# Patient Record
Sex: FEMALE | Race: WHITE | Employment: OTHER | URBAN - METROPOLITAN AREA
[De-identification: names, ages, dates, MRNs, and addresses within clinical notes are randomized per-mention and may not be internally consistent; named-entity substitution may affect disease eponyms.]

---

## 2018-03-27 ENCOUNTER — TELEPHONE (OUTPATIENT)
Dept: ADMINISTRATIVE | Age: 68
End: 2018-03-27

## 2018-03-27 NOTE — TELEPHONE ENCOUNTER
Patient wants bilateral Euflexxa injections again. Please submit paperwork to insurance company for approval. Thank you.

## 2018-03-30 DIAGNOSIS — M25.561 ACUTE PAIN OF BOTH KNEES: Primary | ICD-10-CM

## 2018-03-30 DIAGNOSIS — M25.562 ACUTE PAIN OF BOTH KNEES: Primary | ICD-10-CM

## 2018-04-02 ENCOUNTER — OFFICE VISIT (OUTPATIENT)
Dept: ORTHOPEDIC SURGERY | Age: 68
End: 2018-04-02
Payer: MEDICARE

## 2018-04-02 VITALS — WEIGHT: 260 LBS | BODY MASS INDEX: 46.07 KG/M2 | HEIGHT: 63 IN

## 2018-04-02 DIAGNOSIS — M17.0 PRIMARY OSTEOARTHRITIS OF BOTH KNEES: Primary | ICD-10-CM

## 2018-04-02 PROCEDURE — 1036F TOBACCO NON-USER: CPT | Performed by: ORTHOPAEDIC SURGERY

## 2018-04-02 PROCEDURE — G8417 CALC BMI ABV UP PARAM F/U: HCPCS | Performed by: ORTHOPAEDIC SURGERY

## 2018-04-02 PROCEDURE — 3017F COLORECTAL CA SCREEN DOC REV: CPT | Performed by: ORTHOPAEDIC SURGERY

## 2018-04-02 PROCEDURE — 99213 OFFICE O/P EST LOW 20 MIN: CPT | Performed by: ORTHOPAEDIC SURGERY

## 2018-04-02 PROCEDURE — G8400 PT W/DXA NO RESULTS DOC: HCPCS | Performed by: ORTHOPAEDIC SURGERY

## 2018-04-02 PROCEDURE — 1123F ACP DISCUSS/DSCN MKR DOCD: CPT | Performed by: ORTHOPAEDIC SURGERY

## 2018-04-02 PROCEDURE — 1090F PRES/ABSN URINE INCON ASSESS: CPT | Performed by: ORTHOPAEDIC SURGERY

## 2018-04-02 PROCEDURE — 4040F PNEUMOC VAC/ADMIN/RCVD: CPT | Performed by: ORTHOPAEDIC SURGERY

## 2018-04-02 PROCEDURE — G8427 DOCREV CUR MEDS BY ELIG CLIN: HCPCS | Performed by: ORTHOPAEDIC SURGERY

## 2018-04-02 PROCEDURE — 3014F SCREEN MAMMO DOC REV: CPT | Performed by: ORTHOPAEDIC SURGERY

## 2018-04-30 ENCOUNTER — NURSE ONLY (OUTPATIENT)
Dept: ORTHOPEDIC SURGERY | Age: 68
End: 2018-04-30
Payer: MEDICARE

## 2018-04-30 DIAGNOSIS — M17.0 PRIMARY OSTEOARTHRITIS OF BOTH KNEES: Primary | ICD-10-CM

## 2018-04-30 PROCEDURE — 20610 DRAIN/INJ JOINT/BURSA W/O US: CPT | Performed by: NURSE PRACTITIONER

## 2018-04-30 RX ORDER — HYALURONATE SODIUM 10 MG/ML
20 SYRINGE (ML) INTRAARTICULAR ONCE
Status: COMPLETED | OUTPATIENT
Start: 2018-04-30 | End: 2018-04-30

## 2018-04-30 RX ADMIN — Medication 20 MG: at 09:14

## 2018-05-07 ENCOUNTER — NURSE ONLY (OUTPATIENT)
Dept: ORTHOPEDIC SURGERY | Age: 68
End: 2018-05-07
Payer: MEDICARE

## 2018-05-07 DIAGNOSIS — M17.0 PRIMARY OSTEOARTHRITIS OF BOTH KNEES: Primary | ICD-10-CM

## 2018-05-07 PROCEDURE — 20610 DRAIN/INJ JOINT/BURSA W/O US: CPT | Performed by: NURSE PRACTITIONER

## 2018-05-07 RX ORDER — HYALURONATE SODIUM 10 MG/ML
20 SYRINGE (ML) INTRAARTICULAR ONCE
Status: COMPLETED | OUTPATIENT
Start: 2018-05-07 | End: 2018-05-07

## 2018-05-07 RX ADMIN — Medication 20 MG: at 09:26

## 2018-05-14 ENCOUNTER — NURSE ONLY (OUTPATIENT)
Dept: ORTHOPEDIC SURGERY | Age: 68
End: 2018-05-14
Payer: MEDICARE

## 2018-05-14 DIAGNOSIS — M17.0 PRIMARY OSTEOARTHRITIS OF BOTH KNEES: Primary | ICD-10-CM

## 2018-05-14 PROCEDURE — 20610 DRAIN/INJ JOINT/BURSA W/O US: CPT | Performed by: NURSE PRACTITIONER

## 2018-05-14 RX ORDER — HYALURONATE SODIUM 10 MG/ML
20 SYRINGE (ML) INTRAARTICULAR ONCE
Status: COMPLETED | OUTPATIENT
Start: 2018-05-14 | End: 2018-05-14

## 2018-05-14 RX ADMIN — Medication 20 MG: at 09:21

## 2018-05-14 RX ADMIN — Medication 20 MG: at 09:22

## 2018-10-22 ENCOUNTER — OFFICE VISIT (OUTPATIENT)
Dept: ORTHOPEDIC SURGERY | Age: 68
End: 2018-10-22
Payer: MEDICARE

## 2018-10-22 VITALS — WEIGHT: 260 LBS | TEMPERATURE: 98 F | BODY MASS INDEX: 49.09 KG/M2 | HEIGHT: 61 IN

## 2018-10-22 DIAGNOSIS — M17.0 PRIMARY OSTEOARTHRITIS OF BOTH KNEES: Primary | ICD-10-CM

## 2018-10-22 PROCEDURE — 99213 OFFICE O/P EST LOW 20 MIN: CPT | Performed by: ORTHOPAEDIC SURGERY

## 2018-10-22 PROCEDURE — G8427 DOCREV CUR MEDS BY ELIG CLIN: HCPCS | Performed by: ORTHOPAEDIC SURGERY

## 2018-10-22 PROCEDURE — 4004F PT TOBACCO SCREEN RCVD TLK: CPT | Performed by: ORTHOPAEDIC SURGERY

## 2018-10-22 PROCEDURE — 4040F PNEUMOC VAC/ADMIN/RCVD: CPT | Performed by: ORTHOPAEDIC SURGERY

## 2018-10-22 PROCEDURE — G8400 PT W/DXA NO RESULTS DOC: HCPCS | Performed by: ORTHOPAEDIC SURGERY

## 2018-10-22 PROCEDURE — G8484 FLU IMMUNIZE NO ADMIN: HCPCS | Performed by: ORTHOPAEDIC SURGERY

## 2018-10-22 PROCEDURE — 1123F ACP DISCUSS/DSCN MKR DOCD: CPT | Performed by: ORTHOPAEDIC SURGERY

## 2018-10-22 PROCEDURE — 1101F PT FALLS ASSESS-DOCD LE1/YR: CPT | Performed by: ORTHOPAEDIC SURGERY

## 2018-10-22 PROCEDURE — 1090F PRES/ABSN URINE INCON ASSESS: CPT | Performed by: ORTHOPAEDIC SURGERY

## 2018-10-22 PROCEDURE — 3017F COLORECTAL CA SCREEN DOC REV: CPT | Performed by: ORTHOPAEDIC SURGERY

## 2018-10-22 PROCEDURE — G8417 CALC BMI ABV UP PARAM F/U: HCPCS | Performed by: ORTHOPAEDIC SURGERY

## 2018-11-20 ENCOUNTER — OFFICE VISIT (OUTPATIENT)
Dept: ORTHOPEDIC SURGERY | Age: 68
End: 2018-11-20
Payer: MEDICARE

## 2018-11-20 DIAGNOSIS — M17.0 PRIMARY OSTEOARTHRITIS OF BOTH KNEES: Primary | ICD-10-CM

## 2018-11-20 PROCEDURE — 20610 DRAIN/INJ JOINT/BURSA W/O US: CPT | Performed by: ORTHOPAEDIC SURGERY

## 2018-11-21 RX ORDER — HYALURONATE SODIUM 10 MG/ML
20 SYRINGE (ML) INTRAARTICULAR ONCE
Status: COMPLETED | OUTPATIENT
Start: 2018-11-21 | End: 2018-11-21

## 2018-11-21 RX ADMIN — Medication 20 MG: at 08:39

## 2018-11-27 ENCOUNTER — OFFICE VISIT (OUTPATIENT)
Dept: ORTHOPEDIC SURGERY | Age: 68
End: 2018-11-27
Payer: MEDICARE

## 2018-11-27 DIAGNOSIS — M17.0 PRIMARY OSTEOARTHRITIS OF BOTH KNEES: Primary | ICD-10-CM

## 2018-11-27 PROCEDURE — 20610 DRAIN/INJ JOINT/BURSA W/O US: CPT | Performed by: ORTHOPAEDIC SURGERY

## 2018-11-27 RX ORDER — HYALURONATE SODIUM 10 MG/ML
20 SYRINGE (ML) INTRAARTICULAR ONCE
Status: COMPLETED | OUTPATIENT
Start: 2018-11-27 | End: 2018-11-27

## 2018-11-27 RX ADMIN — Medication 20 MG: at 10:20

## 2018-12-04 ENCOUNTER — NURSE ONLY (OUTPATIENT)
Dept: ORTHOPEDIC SURGERY | Age: 68
End: 2018-12-04
Payer: MEDICARE

## 2018-12-04 DIAGNOSIS — M17.0 PRIMARY OSTEOARTHRITIS OF BOTH KNEES: Primary | ICD-10-CM

## 2018-12-04 PROCEDURE — 20610 DRAIN/INJ JOINT/BURSA W/O US: CPT | Performed by: NURSE PRACTITIONER

## 2018-12-04 RX ORDER — HYALURONATE SODIUM 10 MG/ML
20 SYRINGE (ML) INTRAARTICULAR ONCE
Status: COMPLETED | OUTPATIENT
Start: 2018-12-04 | End: 2018-12-04

## 2018-12-04 RX ADMIN — Medication 20 MG: at 10:16

## 2018-12-04 NOTE — PROGRESS NOTES
Verbal and written consent was obtained by the patient. The following is a well known to me that is here for Euflexxa #3. The knee was prepped in sterile fashion. Euflexxa 20 mg injected to Bilateral knee. The patient tolerated the injections well and I will see the patient back as needed.

## 2019-06-10 ENCOUNTER — OFFICE VISIT (OUTPATIENT)
Dept: ORTHOPEDIC SURGERY | Age: 69
End: 2019-06-10
Payer: MEDICARE

## 2019-06-10 VITALS — TEMPERATURE: 98 F | WEIGHT: 260 LBS | HEIGHT: 61 IN | BODY MASS INDEX: 49.09 KG/M2

## 2019-06-10 DIAGNOSIS — M17.0 PRIMARY OSTEOARTHRITIS OF BOTH KNEES: Primary | ICD-10-CM

## 2019-06-10 PROCEDURE — 3017F COLORECTAL CA SCREEN DOC REV: CPT | Performed by: NURSE PRACTITIONER

## 2019-06-10 PROCEDURE — G8400 PT W/DXA NO RESULTS DOC: HCPCS | Performed by: NURSE PRACTITIONER

## 2019-06-10 PROCEDURE — 4004F PT TOBACCO SCREEN RCVD TLK: CPT | Performed by: NURSE PRACTITIONER

## 2019-06-10 PROCEDURE — G8417 CALC BMI ABV UP PARAM F/U: HCPCS | Performed by: NURSE PRACTITIONER

## 2019-06-10 PROCEDURE — G8427 DOCREV CUR MEDS BY ELIG CLIN: HCPCS | Performed by: NURSE PRACTITIONER

## 2019-06-10 PROCEDURE — 1123F ACP DISCUSS/DSCN MKR DOCD: CPT | Performed by: NURSE PRACTITIONER

## 2019-06-10 PROCEDURE — 4040F PNEUMOC VAC/ADMIN/RCVD: CPT | Performed by: NURSE PRACTITIONER

## 2019-06-10 PROCEDURE — 1090F PRES/ABSN URINE INCON ASSESS: CPT | Performed by: NURSE PRACTITIONER

## 2019-06-10 PROCEDURE — 99213 OFFICE O/P EST LOW 20 MIN: CPT | Performed by: NURSE PRACTITIONER

## 2019-06-10 NOTE — PROGRESS NOTES
Chief Complaint   Patient presents with    Knee Pain     Bilateral Knee, F/U, finished Euflexxa series on 12/04/18 with good resutls. Nanci Valiente returns today for follow-up of her bilateral knee pain. she reports this is worse than when I saw her last.  The patient's pain level is a 5/10. The previous treatment of Euflexxa was successful. She finished her series on December 4, 2018. Past Medical History:   Diagnosis Date    Hypertension      Past Surgical History:   Procedure Laterality Date    ABDOMEN SURGERY  1984       Current Outpatient Medications:     lisinopril (PRINIVIL;ZESTRIL) 20 MG tablet, , Disp: , Rfl:     Multiple Vitamins-Minerals (CENTRUM PO), Take  by mouth., Disp: , Rfl:     Calcium-Vitamin D (CALTRATE 600 PLUS-VIT D PO), Take  by mouth daily. , Disp: , Rfl:     Fish Oil-Cholecalciferol (FISH OIL + D3 PO), Take  by mouth 3 times daily. , Disp: , Rfl:     aspirin 325 MG tablet, Take 325 mg by mouth daily. , Disp: , Rfl:   No Known Allergies  Social History     Socioeconomic History    Marital status: Single     Spouse name: Not on file    Number of children: Not on file    Years of education: Not on file    Highest education level: Not on file   Occupational History    Not on file   Social Needs    Financial resource strain: Not on file    Food insecurity:     Worry: Not on file     Inability: Not on file    Transportation needs:     Medical: Not on file     Non-medical: Not on file   Tobacco Use    Smoking status: Former Smoker   Substance and Sexual Activity    Alcohol use: Yes     Comment: social    Drug use: No    Sexual activity: Not on file   Lifestyle    Physical activity:     Days per week: Not on file     Minutes per session: Not on file    Stress: Not on file   Relationships    Social connections:     Talks on phone: Not on file     Gets together: Not on file     Attends Scientologist service: Not on file     Active member of club or organization: Not on file Attends meetings of clubs or organizations: Not on file     Relationship status: Not on file    Intimate partner violence:     Fear of current or ex partner: Not on file     Emotionally abused: Not on file     Physically abused: Not on file     Forced sexual activity: Not on file   Other Topics Concern    Not on file   Social History Narrative    Not on file     No family history on file. Review of Systems:     Skin: (-) rash,(-) psoriasis,(-) eczema, (-)skin cancer. Musculoskeletal: (-) fractures,  (-) dislocations,(-) collagen vascular disease, (-) fibromyalgia, (-) multiple sclerosis, (-) muscular dystrophy, (-) RSD,(-) joint pain (-)swelling, (-) joint pain,swelling. Neurologic: (-) epilepsy, (-)seizures,(-) brain tumor,(-) TIA, (-)stroke, (-)headaches, (-)Parkinson disease,(-) memory loss, (-) LOC. Cardiovascular: (-) Chest pain, (-) swelling in legs/feet, (-) SOB, (-) cramping in legs/feet with walking. Subjective:    Constitutional:    The patient is alert and oriented x 3, appears to be stated age and in no distress. Ht. 5 ft 3 in., Wt. 260 lbs. Skin:    Upon inspection: the skin appears warm, dry and intact. There is not a previous scar over the affected area. There is not any cellulitis, lymphedema or cutaneous lesions noted in the lower extremities. Upon palpation there is no induration noted. Neurologic:    Gait: normal;  Motor exam of the lower extremities show ; quadriceps, hamstrings, foot dorsi and plantar flexors intact R.  5/5 and L. 5/5. Deep tendon reflexes are 2/4 at the knees and 2/4 at the ankles with strong extensor hallicus longus motor strength bilaterally. Sensory to both feet is intact to all sensory roots. Cardiovascular: The vascular exam is normal and is well perfused to distal extremities. Distal pulses DP/PT: R. 2+; L. 2+. There is cap refill noted less than two seconds in all digits. There is not edema of the bilateral lower extremities. There is not varicosities noted in the distal extremities. Lymph:    Upon palpation,  there is no lymphadenopathy noted in bilateral lower extremities. Musculoskeletal:  Gait: normal; examination of the nails and digits reveal no cyanosis or clubbing    Lumbar exam:    On visual inspection, there is not deformity of the spine. full range of motion, no tenderness, palpable spasm or pain on motion. Special tests: Straight Leg Raise negative, Julia testnegative. Hip exam-     Upon inspection, there is not deformity noted. Upon palpation there is not tenderness. ROM: is   full and semetrical.   Strength: Hip Flexors 5/5; Hip Abductors 5/5; Hip Adduction 5/5. Knee exam      Bilateral knee exam shows;  range of motion of R. Knee is -5 to 100, and L. Knee is 0 to 95. The patient does not have  pain on motion, effusion is mild, there is not tenderness over the  global region, there are not any masses, there is not ligamentous instability, there is  deformity noted. Knee exam: bilateral positive for moderate crepitations, some mild tenderness laxity is not noted with stress. R. Knee:  Lachman's negative, Anterior Drawer negative, Posterior Drawer negative  Flora's negative, Thallasy  negative,   PF grind test negative, Apprehension test negative, Patellar J sign  negative  L. Knee:  Lachman's negative, Anterior Drawer negative, Posterior Drawer negative  Flora's negative, Thallasy  negative,   PF grind test negative, Apprehension test negative,  Patellar J sign  negative    Impression:   Encounter Diagnosis   Name Primary?  Primary osteoarthritis of both knees Yes         Plan:   I had a lengthy discussion with the patient regarding their diagnosis. I explained treatment options including surgical vs non surgical treatment. I reviewed in detail the risks and benefits and outlined the procedure in detail with expected outcomes and possible complications.   I also discussed non surgical treatment such as injections (CSI and visco supplementation), physical therapy, topical creams and NSAID's. They have elected for conservative management at this time. The patient has failed conservative measures such as NSAIDS, HEP, and cortisone injections. She is an excellent candidate for viscous supplementation injections including Euflexxa in the Bilateral knee.    We will contact the patient's insurance company and see them back in the office once we have received approval.

## 2019-06-24 ENCOUNTER — TELEPHONE (OUTPATIENT)
Dept: ADMINISTRATIVE | Age: 69
End: 2019-06-24

## 2019-06-24 NOTE — TELEPHONE ENCOUNTER
Patient called checking on status of her bilateral knee Euflexxa injections. Please follow up. Thank you.

## 2019-06-24 NOTE — TELEPHONE ENCOUNTER
Called and spoke to patient and let her know as soon as we get approval we will call her.  Electronically signed by Jorge Espinoza MA on 6/24/19 at 11:22 AM

## 2019-07-17 ENCOUNTER — NURSE ONLY (OUTPATIENT)
Dept: ORTHOPEDIC SURGERY | Age: 69
End: 2019-07-17
Payer: MEDICARE

## 2019-07-17 DIAGNOSIS — M17.0 PRIMARY OSTEOARTHRITIS OF BOTH KNEES: Primary | ICD-10-CM

## 2019-07-17 PROCEDURE — 20611 DRAIN/INJ JOINT/BURSA W/US: CPT | Performed by: NURSE PRACTITIONER

## 2019-07-17 PROCEDURE — 99999 PR OFFICE/OUTPT VISIT,PROCEDURE ONLY: CPT | Performed by: NURSE PRACTITIONER

## 2019-07-17 RX ORDER — HYALURONATE SODIUM 10 MG/ML
20 SYRINGE (ML) INTRAARTICULAR ONCE
Status: COMPLETED | OUTPATIENT
Start: 2019-07-17 | End: 2019-07-17

## 2019-07-17 RX ADMIN — Medication 20 MG: at 11:04

## 2019-07-17 NOTE — PROGRESS NOTES
Verbal and written consent was obtained by the patient. The following is a well known to me that is here for bilateral knee injections. They are here for  Euflexxa # 1. Her knees were prepped in sterile fashion. Euflexxa 20 mg injected to Bilateral knees using ultrasound guidance. The patient tolerated the injections well and I will see the patient back in 1 week for repeat injections.

## 2019-07-23 ENCOUNTER — OFFICE VISIT (OUTPATIENT)
Dept: ORTHOPEDIC SURGERY | Age: 69
End: 2019-07-23
Payer: MEDICARE

## 2019-07-23 DIAGNOSIS — M17.0 PRIMARY OSTEOARTHRITIS OF BOTH KNEES: Primary | ICD-10-CM

## 2019-07-23 PROCEDURE — 20611 DRAIN/INJ JOINT/BURSA W/US: CPT | Performed by: ORTHOPAEDIC SURGERY

## 2019-07-23 RX ORDER — HYALURONATE SODIUM 10 MG/ML
20 SYRINGE (ML) INTRAARTICULAR ONCE
Status: COMPLETED | OUTPATIENT
Start: 2019-07-23 | End: 2019-07-23

## 2019-07-23 RX ADMIN — Medication 20 MG: at 09:30

## 2019-07-30 ENCOUNTER — OFFICE VISIT (OUTPATIENT)
Dept: ORTHOPEDIC SURGERY | Age: 69
End: 2019-07-30
Payer: MEDICARE

## 2019-07-30 DIAGNOSIS — M17.0 PRIMARY OSTEOARTHRITIS OF BOTH KNEES: Primary | ICD-10-CM

## 2019-07-30 PROCEDURE — 20611 DRAIN/INJ JOINT/BURSA W/US: CPT | Performed by: ORTHOPAEDIC SURGERY

## 2019-07-30 RX ORDER — HYALURONATE SODIUM 10 MG/ML
20 SYRINGE (ML) INTRAARTICULAR ONCE
Status: COMPLETED | OUTPATIENT
Start: 2019-07-30 | End: 2019-07-30

## 2019-07-30 RX ADMIN — Medication 20 MG: at 09:11

## 2020-02-05 ENCOUNTER — OFFICE VISIT (OUTPATIENT)
Dept: ORTHOPEDIC SURGERY | Age: 70
End: 2020-02-05
Payer: MEDICARE

## 2020-02-05 VITALS — TEMPERATURE: 98.5 F | BODY MASS INDEX: 46.07 KG/M2 | HEIGHT: 63 IN | WEIGHT: 260 LBS

## 2020-02-05 PROCEDURE — 3017F COLORECTAL CA SCREEN DOC REV: CPT | Performed by: NURSE PRACTITIONER

## 2020-02-05 PROCEDURE — 99213 OFFICE O/P EST LOW 20 MIN: CPT | Performed by: NURSE PRACTITIONER

## 2020-02-05 PROCEDURE — 1123F ACP DISCUSS/DSCN MKR DOCD: CPT | Performed by: NURSE PRACTITIONER

## 2020-02-05 PROCEDURE — 4040F PNEUMOC VAC/ADMIN/RCVD: CPT | Performed by: NURSE PRACTITIONER

## 2020-02-05 PROCEDURE — G8400 PT W/DXA NO RESULTS DOC: HCPCS | Performed by: NURSE PRACTITIONER

## 2020-02-05 PROCEDURE — 20610 DRAIN/INJ JOINT/BURSA W/O US: CPT | Performed by: NURSE PRACTITIONER

## 2020-02-05 PROCEDURE — G8484 FLU IMMUNIZE NO ADMIN: HCPCS | Performed by: NURSE PRACTITIONER

## 2020-02-05 PROCEDURE — 1090F PRES/ABSN URINE INCON ASSESS: CPT | Performed by: NURSE PRACTITIONER

## 2020-02-05 PROCEDURE — 4004F PT TOBACCO SCREEN RCVD TLK: CPT | Performed by: NURSE PRACTITIONER

## 2020-02-05 PROCEDURE — G8427 DOCREV CUR MEDS BY ELIG CLIN: HCPCS | Performed by: NURSE PRACTITIONER

## 2020-02-05 PROCEDURE — G8417 CALC BMI ABV UP PARAM F/U: HCPCS | Performed by: NURSE PRACTITIONER

## 2020-02-05 RX ORDER — TRIAMCINOLONE ACETONIDE 40 MG/ML
40 INJECTION, SUSPENSION INTRA-ARTICULAR; INTRAMUSCULAR ONCE
Status: COMPLETED | OUTPATIENT
Start: 2020-02-05 | End: 2020-02-05

## 2020-02-05 RX ADMIN — TRIAMCINOLONE ACETONIDE 40 MG: 40 INJECTION, SUSPENSION INTRA-ARTICULAR; INTRAMUSCULAR at 08:38

## 2020-02-05 NOTE — PROGRESS NOTES
Chief Complaint   Patient presents with    Knee Pain     bilateral knee pain continues. Patient had great relief with Euflexxa and would like to repeat the series. Carolina Collazo returns today for follow-up of her bilateral knee pain. she reports this is worse than when I saw her last.  The patient's pain level is a 8/10. She states she gets great relief from Euflexxa and would like to repeat the series. Past Medical History:   Diagnosis Date    Hypertension      Past Surgical History:   Procedure Laterality Date    ABDOMEN SURGERY  1984       Current Outpatient Medications:     lisinopril (PRINIVIL;ZESTRIL) 20 MG tablet, , Disp: , Rfl:     Multiple Vitamins-Minerals (CENTRUM PO), Take  by mouth., Disp: , Rfl:     Calcium-Vitamin D (CALTRATE 600 PLUS-VIT D PO), Take  by mouth daily. , Disp: , Rfl:     Fish Oil-Cholecalciferol (FISH OIL + D3 PO), Take  by mouth 3 times daily. , Disp: , Rfl:     aspirin 325 MG tablet, Take 325 mg by mouth daily. , Disp: , Rfl:   No Known Allergies  Social History     Socioeconomic History    Marital status: Single     Spouse name: Not on file    Number of children: Not on file    Years of education: Not on file    Highest education level: Not on file   Occupational History    Not on file   Social Needs    Financial resource strain: Not on file    Food insecurity:     Worry: Not on file     Inability: Not on file    Transportation needs:     Medical: Not on file     Non-medical: Not on file   Tobacco Use    Smoking status: Former Smoker   Substance and Sexual Activity    Alcohol use: Yes     Comment: social    Drug use: No    Sexual activity: Not on file   Lifestyle    Physical activity:     Days per week: Not on file     Minutes per session: Not on file    Stress: Not on file   Relationships    Social connections:     Talks on phone: Not on file     Gets together: Not on file     Attends Synagogue service: Not on file     Active member of club or

## 2020-08-17 ENCOUNTER — NURSE ONLY (OUTPATIENT)
Dept: ORTHOPEDIC SURGERY | Age: 70
End: 2020-08-17
Payer: MEDICARE

## 2020-08-17 PROCEDURE — 20610 DRAIN/INJ JOINT/BURSA W/O US: CPT | Performed by: NURSE PRACTITIONER

## 2020-08-17 RX ORDER — HYALURONATE SODIUM 10 MG/ML
20 SYRINGE (ML) INTRAARTICULAR ONCE
Status: COMPLETED | OUTPATIENT
Start: 2020-08-17 | End: 2020-08-17

## 2020-08-17 RX ADMIN — Medication 20 MG: at 09:01

## 2020-08-17 RX ADMIN — Medication 20 MG: at 09:00

## 2020-08-24 ENCOUNTER — NURSE ONLY (OUTPATIENT)
Dept: ORTHOPEDIC SURGERY | Age: 70
End: 2020-08-24
Payer: MEDICARE

## 2020-08-24 PROCEDURE — 20610 DRAIN/INJ JOINT/BURSA W/O US: CPT | Performed by: NURSE PRACTITIONER

## 2020-08-24 RX ORDER — HYALURONATE SODIUM 10 MG/ML
20 SYRINGE (ML) INTRAARTICULAR ONCE
Status: COMPLETED | OUTPATIENT
Start: 2020-08-24 | End: 2020-08-24

## 2020-08-24 RX ADMIN — Medication 20 MG: at 08:48

## 2020-08-24 NOTE — PROGRESS NOTES
Chief Complaint   Patient presents with    Injections     bilateral knee euflexxa #2       Verbal and written consent was obtained by the patient. The following is a well known to me that is here for bilateral knee injections. They are here for  Euflexxa # 2. Her knees were prepped in sterile fashion. Euflexxa 20 mg injected to Bilateral knees. The patient tolerated the injections well and I will see the patient back in 1 week for repeat injections. Vi Chris was seen today for injections.     Diagnoses and all orders for this visit:    Primary osteoarthritis of both knees  -     20610 - [place] DRAIN/INJECT LARGE JOINT/BURSA  -     81329 - [place] DRAIN/INJECT LARGE JOINT/BURSA    Other orders  -     sodium hyaluronate (viscosup) injection 20 mg  -     sodium hyaluronate (viscosup) injection 20 mg

## 2020-08-31 ENCOUNTER — NURSE ONLY (OUTPATIENT)
Dept: ORTHOPEDIC SURGERY | Age: 70
End: 2020-08-31
Payer: MEDICARE

## 2020-08-31 PROCEDURE — 20610 DRAIN/INJ JOINT/BURSA W/O US: CPT | Performed by: NURSE PRACTITIONER

## 2020-08-31 RX ORDER — HYALURONATE SODIUM 10 MG/ML
20 SYRINGE (ML) INTRAARTICULAR ONCE
Status: COMPLETED | OUTPATIENT
Start: 2020-08-31 | End: 2020-08-31

## 2020-08-31 RX ADMIN — Medication 20 MG: at 09:08

## 2020-08-31 RX ADMIN — Medication 20 MG: at 09:09

## 2020-08-31 NOTE — PROGRESS NOTES
Chief Complaint   Patient presents with    Injections     bilateral euflexxa #3       Verbal and written consent was obtained by the patient. The following is a well known to me that is here for Euflexxa #3. The knee was prepped in sterile fashion. Euflexxa 20 mg injected to Bilateral knee . The patient tolerated the injections well and I will see the patient back as needed. Liz Covarrubias was seen today for injections.     Diagnoses and all orders for this visit:    Primary osteoarthritis of both knees  -     20610 - [place] DRAIN/INJECT LARGE JOINT/BURSA  -     20610 - [place] DRAIN/INJECT LARGE JOINT/BURSA    Other orders  -     sodium hyaluronate (viscosup) injection 20 mg  -     sodium hyaluronate (viscosup) injection 20 mg

## 2020-11-17 ENCOUNTER — HOSPITAL ENCOUNTER (EMERGENCY)
Age: 70
Discharge: HOME | End: 2020-11-17
Payer: MEDICARE

## 2020-11-17 ENCOUNTER — APPOINTMENT (OUTPATIENT)
Dept: GENERAL RADIOLOGY | Age: 70
End: 2020-11-17
Payer: MEDICARE

## 2020-11-17 VITALS
BODY MASS INDEX: 45.88 KG/M2 | DIASTOLIC BLOOD PRESSURE: 89 MMHG | RESPIRATION RATE: 20 BRPM | TEMPERATURE: 98.2 F | OXYGEN SATURATION: 97 % | WEIGHT: 259 LBS | HEART RATE: 91 BPM | SYSTOLIC BLOOD PRESSURE: 176 MMHG

## 2020-11-17 PROCEDURE — 73560 X-RAY EXAM OF KNEE 1 OR 2: CPT

## 2020-11-17 PROCEDURE — 99212 OFFICE O/P EST SF 10 MIN: CPT

## 2020-11-17 NOTE — ED PROVIDER NOTES
Department of Emergency Medicine  [address] [place] [address]  Provider Note  Admit Date/Time: 11/17/2020 10:33 AM  Room: 05/05  MRN: [id number]  Chief Complaint: Leg Pain (right leg pain, pain is behind the knee area, and in front of the shin, hx of lymphedema, started last night)       History of Present Illness   Source of history provided by:  patient. History/Exam Limitations: none. Cristopher Blevins is a 65 y.o. female who has a past medical history of:   Past Medical History:   Diagnosis Date    Hypertension     presents to the urgent care center by private car for pain and swelling in her right knee and also pain in the right calf just below the knee area. This  started last night. She does have lymphedema and she used her lymphedema sleeve last night to see if that would help but it did not help her. She has some chronic knee problems and gets injections by orthopedics in her knee. There  was no injury. She is  not able to work fully extend  Or flex her knee because of the increased swelling. She denies chest pain or shortness of breath. This started yesterday. ROS    Pertinent positives and negatives are stated within HPI, all other systems reviewed and are negative. Past Surgical History:   Procedure Laterality Date    ABDOMEN SURGERY  1984   Social History:  reports that she has quit smoking. She does not have any smokeless tobacco history on file. She reports current alcohol use. She reports that she does not use drugs. Family History: family history is not on file. Allergies: Patient has no known allergies. Physical Exam   Oxygen Saturation Interpretation: Normal.   ED Triage Vitals [11/17/20 1034]   BP Temp Temp src Pulse Resp SpO2 Height Weight   (!) 176/89 98.2 °F (36.8 °C) -- 91 20 97 % -- 259 lb (117.5 kg)       Physical Exam  · Constitutional/General: Alert and oriented x3, well appearing, non toxic in NAD  · HEENT:  NC/NT.  Clear conjunctiva  Airway patent. · Neck: Supple, full ROM,   · Respiratory:  Not in respiratory distress  · CV:  Regular rate. Regular rhythm. Lymphedema present in both lower extremities  · GI:  Abdomen Soft, Non tender, Non distended. +BS. No rebound, guarding, or rigidity. No pulsatile masses. · Musculoskeletal: Moves all extremities x 4. SHe can ambulate and she can flex and extend her knee she said it is not as far as before. There is no erythema or abnormal warmth over the patellar area. No calf tenderness. · Integument: skin warm and dry. No rashes. · Neurologic: GCS 15, no focal deficits, symmetric strength 5/5 in the upper and lower extremities bilaterally  · Psychiatric: Normal Affect    Lab / Imaging Results   (All laboratory and radiology results have been personally reviewed by myself)  Labs:  No results found for this visit on 11/17/20. Imaging: All Radiology results interpreted by Radiologist unless otherwise noted. US DUP LOWER EXTREMITY RIGHT CARLITO   Final Result   1. No evidence of DVT in the right lower extremity. 2.  Area of hypoechogenicity located in right popliteal fossa suggests a   popliteal cyst.  Ultrasound follow-up may be helpful to assure stability. XR KNEE RIGHT (1-2 VIEWS)   Preliminary Result   Advanced tricompartmental degenerative changes of the right knee with   bone-on-bone articulation. ED Course / Medical Decision Making   Medications - No data to display         MDM:   X-ray shows advanced arthritis from tricompartmental degenerative changes of the right knee ultrasound was negative for DVT she does have a popliteal cyst.  Discussed results with her she already sees Dr. Rose Roy for the knee arthritis. I offered to give her pain medication she said she just takes over-the-counter medicine for this-- she likes to take Aleve. She will follow-up with Dr. Rose Roy.     Counseling:    I have  spoken with the patient and discussed todays results, in addition to providing specific details for the plan of care and counseling regarding the diagnosis and prognosis. Questions are answered at this time and they are agreeable with the plan. Assessment      1. Osteoarthritis of knee, unspecified laterality, unspecified osteoarthritis type    2. Synovial cyst of popliteal space, unspecified laterality      Plan   Discharge to home and advised to contact [address]  [facility] [address]  [address], [address] [facility]  [phone]    Schedule an appointment as soon as possible for a visit      Patient condition is good    New Medications     New Prescriptions    No medications on file     Electronically signed by TEMO Wong Asa, CNP   DD: 11/17/20  **This report was transcribed using voice recognition software. Every effort was made to ensure accuracy; however, inadvertent computerized transcription errors may be present.   END OF ED PROVIDER NOTE     TEMO Wong Asa, CNP  11/17/20 [id number]

## 2020-12-07 ENCOUNTER — OFFICE VISIT (OUTPATIENT)
Dept: ORTHOPEDIC SURGERY | Age: 70
End: 2020-12-07
Payer: MEDICARE

## 2020-12-07 VITALS — WEIGHT: 259 LBS | BODY MASS INDEX: 48.9 KG/M2 | HEIGHT: 61 IN | TEMPERATURE: 98 F

## 2020-12-07 PROCEDURE — 1090F PRES/ABSN URINE INCON ASSESS: CPT | Performed by: ORTHOPAEDIC SURGERY

## 2020-12-07 PROCEDURE — G8400 PT W/DXA NO RESULTS DOC: HCPCS | Performed by: ORTHOPAEDIC SURGERY

## 2020-12-07 PROCEDURE — 99213 OFFICE O/P EST LOW 20 MIN: CPT | Performed by: ORTHOPAEDIC SURGERY

## 2020-12-07 PROCEDURE — 4004F PT TOBACCO SCREEN RCVD TLK: CPT | Performed by: ORTHOPAEDIC SURGERY

## 2020-12-07 PROCEDURE — 1123F ACP DISCUSS/DSCN MKR DOCD: CPT | Performed by: ORTHOPAEDIC SURGERY

## 2020-12-07 PROCEDURE — 3017F COLORECTAL CA SCREEN DOC REV: CPT | Performed by: ORTHOPAEDIC SURGERY

## 2020-12-07 PROCEDURE — 20610 DRAIN/INJ JOINT/BURSA W/O US: CPT | Performed by: ORTHOPAEDIC SURGERY

## 2020-12-07 PROCEDURE — 4040F PNEUMOC VAC/ADMIN/RCVD: CPT | Performed by: ORTHOPAEDIC SURGERY

## 2020-12-07 PROCEDURE — G8417 CALC BMI ABV UP PARAM F/U: HCPCS | Performed by: ORTHOPAEDIC SURGERY

## 2020-12-07 PROCEDURE — G8484 FLU IMMUNIZE NO ADMIN: HCPCS | Performed by: ORTHOPAEDIC SURGERY

## 2020-12-07 PROCEDURE — G8427 DOCREV CUR MEDS BY ELIG CLIN: HCPCS | Performed by: ORTHOPAEDIC SURGERY

## 2020-12-07 RX ORDER — TRIAMCINOLONE ACETONIDE 40 MG/ML
40 INJECTION, SUSPENSION INTRA-ARTICULAR; INTRAMUSCULAR ONCE
Status: COMPLETED | OUTPATIENT
Start: 2020-12-07 | End: 2020-12-07

## 2020-12-07 RX ADMIN — TRIAMCINOLONE ACETONIDE 40 MG: 40 INJECTION, SUSPENSION INTRA-ARTICULAR; INTRAMUSCULAR at 11:44

## 2020-12-07 NOTE — PROGRESS NOTES
distal extremities. Lymph:  Upon palpation,  there is no lymphadenopathy noted in bilateral lower extremities. Musculoskeletal:  Gait: antalgic; examination of the nails and digits reveal no cyanosis or clubbing    Lumbar exam:  On visual inspection, there is no deformity of the spine. full range of motion, no tenderness, palpable spasm or pain on motion. Special tests: Straight Leg Raise negative, Julia testnegative. Hip exam:  Upon inspection, there is no deformity noted. Upon palpation there is not tenderness. ROM: is   full and semetrical.   Strength: Hip Flexors 5/5; Hip Abductors 5/5; Hip Adduction 5/5. Knee exam:  Bilateral knee exam shows;  range of motion of R. Knee is 0 to 90, and L. Knee is 0 to 90. She does have  pain on motion, effusion is mild, there is tenderness over the  medial region, there are not any masses, there is not ligamentous instability, there is  deformity noted. Knee exam: bilateral positive for moderate crepitations, some mild tenderness laxity is not noted with  stress. R. Knee:  Lachman's negative, Anterior Drawer negative, Posterior Drawer negative  Flora's negative, Thallasy  negative,   PF grind test negative, Apprehension test negative, Patellar J sign  negative  L. Knee:  Lachman's negative, Anterior Drawer negative, Posterior Drawer negative  Flora's negative, Thallasy  negative,   PF grind test negative, Apprehension test negative,  Patellar J sign  negative    Xrays:   Impression    Advanced tricompartmental degenerative changes of the right knee with    bone-on-bone articulation. MRI:   n/a  Radiographic findings reviewed with patient    Impression:  Encounter Diagnoses   Name Primary?  Primary osteoarthritis of right knee Yes    Primary osteoarthritis of left knee        Plan:   Natural history and expected course discussed. Questions answered. Educational materials distributed.   Rest, ice, compression, and elevation (RICE) therapy. Reduction in offending activity. Patellar compression sleeve. I will proceed with a cortisone injection in the Bilateral knees. Verbal and written consent was obtained for the injections. Skin was prepped with alcohol. 1 ml of Kenalog 40mg and 9 ml of 0.25% Marcaine was  injected to Bilateral knees. The injections were given through the lateral side of the knees. The patient tolerated the injections well.  I will see the patient back prn

## 2021-03-01 ENCOUNTER — TELEPHONE (OUTPATIENT)
Dept: ADMINISTRATIVE | Age: 71
End: 2021-03-01

## 2021-03-01 NOTE — TELEPHONE ENCOUNTER
Patient calling to Follow-up for knee injections and back eval. Patient states she seen Dr. Cathi Mcwilliams previously for her back and was told to f/u with with him. Per protocol office alvarez not see pts for back. Patient unsure of which injection she is to receive for her knees. Please advise for scheduling.

## 2021-03-09 ENCOUNTER — OFFICE VISIT (OUTPATIENT)
Dept: ORTHOPEDIC SURGERY | Age: 71
End: 2021-03-09
Payer: MEDICARE

## 2021-03-09 VITALS — WEIGHT: 259 LBS | HEIGHT: 61 IN | BODY MASS INDEX: 48.9 KG/M2

## 2021-03-09 DIAGNOSIS — M17.11 PRIMARY OSTEOARTHRITIS OF RIGHT KNEE: Primary | ICD-10-CM

## 2021-03-09 DIAGNOSIS — M17.12 PRIMARY OSTEOARTHRITIS OF LEFT KNEE: ICD-10-CM

## 2021-03-09 DIAGNOSIS — M48.062 SPINAL STENOSIS OF LUMBAR REGION WITH NEUROGENIC CLAUDICATION: ICD-10-CM

## 2021-03-09 PROCEDURE — G8417 CALC BMI ABV UP PARAM F/U: HCPCS | Performed by: ORTHOPAEDIC SURGERY

## 2021-03-09 PROCEDURE — 99214 OFFICE O/P EST MOD 30 MIN: CPT | Performed by: ORTHOPAEDIC SURGERY

## 2021-03-09 PROCEDURE — 4040F PNEUMOC VAC/ADMIN/RCVD: CPT | Performed by: ORTHOPAEDIC SURGERY

## 2021-03-09 PROCEDURE — G8400 PT W/DXA NO RESULTS DOC: HCPCS | Performed by: ORTHOPAEDIC SURGERY

## 2021-03-09 PROCEDURE — 4004F PT TOBACCO SCREEN RCVD TLK: CPT | Performed by: ORTHOPAEDIC SURGERY

## 2021-03-09 PROCEDURE — 1123F ACP DISCUSS/DSCN MKR DOCD: CPT | Performed by: ORTHOPAEDIC SURGERY

## 2021-03-09 PROCEDURE — 1090F PRES/ABSN URINE INCON ASSESS: CPT | Performed by: ORTHOPAEDIC SURGERY

## 2021-03-09 PROCEDURE — G8427 DOCREV CUR MEDS BY ELIG CLIN: HCPCS | Performed by: ORTHOPAEDIC SURGERY

## 2021-03-09 PROCEDURE — 3017F COLORECTAL CA SCREEN DOC REV: CPT | Performed by: ORTHOPAEDIC SURGERY

## 2021-03-09 PROCEDURE — 20610 DRAIN/INJ JOINT/BURSA W/O US: CPT | Performed by: ORTHOPAEDIC SURGERY

## 2021-03-09 PROCEDURE — G8484 FLU IMMUNIZE NO ADMIN: HCPCS | Performed by: ORTHOPAEDIC SURGERY

## 2021-03-09 RX ORDER — TRIAMCINOLONE ACETONIDE 40 MG/ML
40 INJECTION, SUSPENSION INTRA-ARTICULAR; INTRAMUSCULAR ONCE
Status: COMPLETED | OUTPATIENT
Start: 2021-03-09 | End: 2021-03-09

## 2021-03-09 RX ADMIN — TRIAMCINOLONE ACETONIDE 40 MG: 40 INJECTION, SUSPENSION INTRA-ARTICULAR; INTRAMUSCULAR at 10:57

## 2021-03-09 RX ADMIN — TRIAMCINOLONE ACETONIDE 40 MG: 40 INJECTION, SUSPENSION INTRA-ARTICULAR; INTRAMUSCULAR at 10:56

## 2021-03-09 NOTE — PROGRESS NOTES
Chief Complaint   Patient presents with    Knee Pain     /Bilateral knee pain follow up. Previous csi 12/7/20       Kanu Parish returns today for follow-up of her bilateral knee pain. she reports this is worse than when I saw her last.  The patient's pain level is a 8/10. The previous treatment was successful. Past Medical History:   Diagnosis Date    Hypertension      Past Surgical History:   Procedure Laterality Date    ABDOMEN SURGERY  1984       Current Outpatient Medications:     lisinopril (PRINIVIL;ZESTRIL) 20 MG tablet, , Disp: , Rfl:     Multiple Vitamins-Minerals (CENTRUM PO), Take  by mouth., Disp: , Rfl:     Calcium-Vitamin D (CALTRATE 600 PLUS-VIT D PO), Take  by mouth daily. , Disp: , Rfl:     Fish Oil-Cholecalciferol (FISH OIL + D3 PO), Take  by mouth 3 times daily. , Disp: , Rfl:     aspirin 325 MG tablet, Take 325 mg by mouth daily. , Disp: , Rfl:   No Known Allergies  Social History     Socioeconomic History    Marital status: Single     Spouse name: Not on file    Number of children: Not on file    Years of education: Not on file    Highest education level: Not on file   Occupational History    Not on file   Social Needs    Financial resource strain: Not on file    Food insecurity     Worry: Not on file     Inability: Not on file    Transportation needs     Medical: Not on file     Non-medical: Not on file   Tobacco Use    Smoking status: Former Smoker   Substance and Sexual Activity    Alcohol use: Yes     Comment: social    Drug use: No    Sexual activity: Not on file   Lifestyle    Physical activity     Days per week: Not on file     Minutes per session: Not on file    Stress: Not on file   Relationships    Social connections     Talks on phone: Not on file     Gets together: Not on file     Attends [employer] service: Not on file     Active member of club or organization: Not on file     Attends meetings of clubs or organizations: Not on file     Relationship status: Not on file    Intimate partner violence     Fear of current or ex partner: Not on file     Emotionally abused: Not on file     Physically abused: Not on file     Forced sexual activity: Not on file   Other Topics Concern    Not on file   Social History Narrative    Not on file     No family history on file. Review of Systems:     Skin: (-) rash,(-) psoriasis,(-) eczema, (-)skin cancer. Musculoskeletal: (-) fractures,  (-) dislocations,(-) collagen vascular disease, (-) fibromyalgia, (-) multiple sclerosis, (-) muscular dystrophy, (-) RSD,(-) joint pain (-)swelling, (-) joint pain,swelling. Neurologic: (-) epilepsy, (-)seizures,(-) brain tumor,(-) TIA, (-)stroke, (-)headaches, (-)Parkinson disease,(-) memory loss, (-) LOC. Cardiovascular: (-) Chest pain, (-) swelling in legs/feet, (-) SOB, (-) cramping in legs/feet with walking. Constitutional:  The patient is alert and oriented x 3, appears to be stated age and in no distress. Ht 5' 1\" (1.549 m)   Wt 259 lb (117.5 kg)   BMI 48.94 kg/m²     Skin:  Upon inspection: the skin appears warm, dry and intact. There is not a previous scar over the affected area. There is not any cellulitis, lymphedema or cutaneous lesions noted in the lower extremities. Upon palpation there is no induration noted. Neurologic:  Gait: normal;  Motor exam of the lower extremities show ; quadriceps, hamstrings, foot dorsi and plantar flexors intact R.  5/5 and L. 5/5. Deep tendon reflexes are 2/4 at the knees and 2/4 at the ankles with strong extensor hallicus longus motor strength bilaterally. Sensory to both feet is intact to all sensory roots. Cardiovascular: The vascular exam is normal and is well perfused to distal extremities. Distal pulses DP/PT: R. 2+; L. 2+. There is cap refill noted less than two seconds in all digits. There is not edema of the bilateral lower extremities. There is not varicosities noted in the distal extremities.       Lymph:  Upon palpation,  there is no lymphadenopathy noted in bilateral lower extremities. Musculoskeletal:  Gait: antalgic; examination of the nails and digits reveal no cyanosis or clubbing    Lumbar exam:  On visual inspection, there is no deformity of the spine. full range of motion, no tenderness, palpable spasm or pain on motion. Special tests: Straight Leg Raise negative, Julia testnegative. Hip exam:  Upon inspection, there is no deformity noted. Upon palpation there is not tenderness. ROM: is   full and semetrical.   Strength: Hip Flexors 5/5; Hip Abductors 5/5; Hip Adduction 5/5. Knee exam:  Bilateral knee exam shows;  range of motion of R. Knee is 0 to 90, and L. Knee is 0 to 90. She does have  pain on motion, effusion is mild, there is tenderness over the  medial region, there are not any masses, there is not ligamentous instability, there is  deformity noted. Knee exam: bilateral positive for moderate crepitations, some mild tenderness laxity is not noted with  stress. R. Knee:  Lachman's negative, Anterior Drawer negative, Posterior Drawer negative  Flora's negative, Thallasy  negative,   PF grind test negative, Apprehension test negative, Patellar J sign  negative  L. Knee:  Lachman's negative, Anterior Drawer negative, Posterior Drawer negative  Flora's negative, Thallasy  negative,   PF grind test negative, Apprehension test negative,  Patellar J sign  negative    Xrays:   Impression    Advanced tricompartmental degenerative changes of the right knee with    bone-on-bone articulation. MRI:   n/a  Radiographic findings reviewed with patient    Impression:  Encounter Diagnoses   Name Primary?  Primary osteoarthritis of right knee Yes    Primary osteoarthritis of left knee        Plan:   Natural history and expected course discussed. Questions answered. Educational materials distributed. Rest, ice, compression, and elevation (RICE) therapy. Reduction in offending activity. Patellar compression sleeve. I will proceed with a cortisone injection in the Bilateral knees. Verbal and written consent was obtained for the injections. Skin was prepped with alcohol. 1 ml of Kenalog 40mg and 9 ml of 0.25% Marcaine was  injected to Bilateral knees. The injections were given through the lateral side of the knees. The patient tolerated the injections well. I will see the patient back 3 months.     referal to dr Israel Cos

## 2021-03-16 ENCOUNTER — OFFICE VISIT (OUTPATIENT)
Dept: PHYSICAL MEDICINE AND REHAB | Age: 71
End: 2021-03-16
Payer: MEDICARE

## 2021-03-16 VITALS
BODY MASS INDEX: 47.95 KG/M2 | HEART RATE: 69 BPM | HEIGHT: 61 IN | WEIGHT: 254 LBS | DIASTOLIC BLOOD PRESSURE: 82 MMHG | SYSTOLIC BLOOD PRESSURE: 124 MMHG

## 2021-03-16 DIAGNOSIS — G89.29 CHRONIC LEFT-SIDED LOW BACK PAIN WITH LEFT-SIDED SCIATICA: Primary | ICD-10-CM

## 2021-03-16 DIAGNOSIS — M54.42 CHRONIC LEFT-SIDED LOW BACK PAIN WITH LEFT-SIDED SCIATICA: Primary | ICD-10-CM

## 2021-03-16 DIAGNOSIS — M47.816 LUMBAR SPONDYLOSIS: ICD-10-CM

## 2021-03-16 PROCEDURE — 1090F PRES/ABSN URINE INCON ASSESS: CPT | Performed by: PHYSICAL MEDICINE & REHABILITATION

## 2021-03-16 PROCEDURE — 4040F PNEUMOC VAC/ADMIN/RCVD: CPT | Performed by: PHYSICAL MEDICINE & REHABILITATION

## 2021-03-16 PROCEDURE — 1123F ACP DISCUSS/DSCN MKR DOCD: CPT | Performed by: PHYSICAL MEDICINE & REHABILITATION

## 2021-03-16 PROCEDURE — G8400 PT W/DXA NO RESULTS DOC: HCPCS | Performed by: PHYSICAL MEDICINE & REHABILITATION

## 2021-03-16 PROCEDURE — G8427 DOCREV CUR MEDS BY ELIG CLIN: HCPCS | Performed by: PHYSICAL MEDICINE & REHABILITATION

## 2021-03-16 PROCEDURE — G8417 CALC BMI ABV UP PARAM F/U: HCPCS | Performed by: PHYSICAL MEDICINE & REHABILITATION

## 2021-03-16 PROCEDURE — 1036F TOBACCO NON-USER: CPT | Performed by: PHYSICAL MEDICINE & REHABILITATION

## 2021-03-16 PROCEDURE — 99204 OFFICE O/P NEW MOD 45 MIN: CPT | Performed by: PHYSICAL MEDICINE & REHABILITATION

## 2021-03-16 PROCEDURE — G8484 FLU IMMUNIZE NO ADMIN: HCPCS | Performed by: PHYSICAL MEDICINE & REHABILITATION

## 2021-03-16 PROCEDURE — 3017F COLORECTAL CA SCREEN DOC REV: CPT | Performed by: PHYSICAL MEDICINE & REHABILITATION

## 2021-03-16 NOTE — PROGRESS NOTES
Christine Thomas, [id number] [facility] Physical Medicine and Rehabilitation  [address] [facility]  Phone: [phone]  Fax: [phone]    PCP: Julissa Redd MD  Date of visit: 3/16/21    Chief Complaint   Patient presents with   Kevin Thomas        Dear Dr. Michael Colby,     Thank you for referring your patient to be seen. As you know,  Moon Hickman is a 65 y.o. female with past medical history as below who presents with left buttock and hip pain for few months. Her goal is to be able to walk better. Now, the pain is intermittent and occurs daily. The pain is rated Pain Score:   1, is described as achy, and is located in the left buttock. It was radiating into the left leg, but this has improved. She is getting knee injections which has helped the pain, her ability to do stairs and walk. The pain is worse with doing chores. After 15 minutes or so, she will sit down for some time and then pain will get better. There is  Numbness/tingling in her feet. There is no weakness. There is no bowel/bladder changes. The prior workup has included: Xray     The prior treatment has included:  PT: yes and she continues HEP diligently. Does not want medications. No Known Allergies    Current Outpatient Medications   Medication Sig Dispense Refill    lisinopril (PRINIVIL;ZESTRIL) 20 MG tablet       Multiple Vitamins-Minerals (CENTRUM PO) Take  by mouth.  Calcium-Vitamin D (CALTRATE 600 PLUS-VIT D PO) Take  by mouth daily.  Fish Oil-Cholecalciferol (FISH OIL + D3 PO) Take  by mouth 3 times daily.  aspirin 325 MG tablet Take 325 mg by mouth daily. No current facility-administered medications for this visit.         Past Medical History:   Diagnosis Date    Hypertension     Lymphedema     Metabolic syndrome        Past Surgical History:   Procedure Laterality Date    ABDOMEN SURGERY  1984    VEIN SURGERY         Family History   Problem Relation Age of Onset    Heart Failure Mother     Alzheimer's Disease Father     Anemia Maternal Grandmother     Diabetes Paternal Grandmother        Social History     Tobacco Use    Smoking status: Former Smoker    Smokeless tobacco: Never Used   Substance Use Topics    Alcohol use: Yes     Comment: social    Drug use: No          Functional Status: The patient is able to ambulate and perform activities of daily living without the use of an assistive device. Occupation: The patient is currently retired. ROS: For more complete ROS answered by the patient, please see . Constitutional: Denies fevers, chills, night sweats, unintentional weight loss     Skin: Denies rash or skin changes     Eyes: Denies vision changes    Ears/Nose/Throat: Denies nasal congestion or sore throat     Respiratory: Denies SOB or cough     Cardiovascular: Denies CP, palpitations, edema      Gastrointestinal: Denies abdominal pain,  N/V, constipation, or diarrhea    Genitourinary: Denies urinary symptoms    Neurologic: See HPI.     MSK: See HPI. Psychiatric: Denies sleep disturbance, anxiety, depression    Hematologic/Lymphatic/Immunologic: Denies bruising       Physical Exam:   Blood pressure 124/82, pulse 69, height 5' 1\" (1.549 m), weight 254 lb (115.2 kg). General: well developed and well nourished in no acute distress. Body habitus is morbidly obese  HEENT: No rhinorrhea, sneezing, yawning, or lacrimation. No scleral icterus or conjunctival injection. Resp: symmetrical chest expansion, unlabored breathing, respirations unlabored. CV: Heart rate is regular. Peripheral pulses are palpable  Lymph: No visible regional lymphadenopathy. Skin: No rashes or ecchymosis. Normal turgor. Psych: Mood is calm. Affect is normal.   Ext: bilateral lower extremity lymphedema     MSK:   Back/Hip Exam:   Inspection: Pelvis was asymmetric. Lumbar lordotic curvature was increased. There was no scoliosis.   No ecchymoses or erythema. Palpation: Palpatory exam revealed no tenderness along lumbosacral paraspinals, midline spine, ttp left SI joint sulcus, ttp left gluteus sanjiv. There was no paraspinal spasms. There were no trigger points. ROM: ROM decreased  Special/provocative testing:   Supine SLR negative     Neurological Exam:  Strength:   R  L  Hip Flex  5  5  Knee Ext  5  5  Ankle dorsi  5  5  EHL   5 5-  Ankle Plantar  5  5    Sensory:  Intact for light touch in all lower extremity dermatomes. Reflexes:   R  L  Patellar  (0) (0)  Ankle Jerk  (0) (0)      Gait is Antalgic. Imaging: (personally reviewed by me 03/16/21)  X-ray L spine     Impression:   Dominique Sue is a 65 y.o. female     1. Chronic left-sided low back pain with left-sided sciatica    2. Lumbar spondylosis        Plan:   · Continue HEP. · Discussed proper walking form. · Added clam shells and pelvic tilts to exercise regimen. · If pain becomes worse or affects daily activities, return to discuss further work up and treatment.  The patient was educated about the diagnosis, prognosis, indications, risks and benefits of treatment. An opportunity to ask questions was given to the patient and questions were answered. The patient agreed to proceed with the recommended treatment as described above. Thank you for the consultation and for allowing me to participate in the care of this patient.         Sincerely,     Michael Torres DO, [facility]   Board Certified Physical Medicine and Rehabilitation

## 2021-03-26 ENCOUNTER — IMMUNIZATION (OUTPATIENT)
Dept: PRIMARY CARE CLINIC | Age: 71
End: 2021-03-26
Payer: MEDICARE

## 2021-03-26 PROCEDURE — 0011A COVID-19, MODERNA VACCINE 100MCG/0.5ML DOSE: CPT | Performed by: NURSE PRACTITIONER

## 2021-03-26 PROCEDURE — 91301 COVID-19, MODERNA VACCINE 100MCG/0.5ML DOSE: CPT | Performed by: NURSE PRACTITIONER

## 2021-04-26 ENCOUNTER — IMMUNIZATION (OUTPATIENT)
Dept: PRIMARY CARE CLINIC | Age: 71
End: 2021-04-26
Payer: MEDICARE

## 2021-04-26 PROCEDURE — 91301 COVID-19, MODERNA VACCINE 100MCG/0.5ML DOSE: CPT | Performed by: NURSE PRACTITIONER

## 2021-04-26 PROCEDURE — 0012A COVID-19, MODERNA VACCINE 100MCG/0.5ML DOSE: CPT | Performed by: NURSE PRACTITIONER

## 2021-05-10 ENCOUNTER — APPOINTMENT (OUTPATIENT)
Dept: GENERAL RADIOLOGY | Age: 71
End: 2021-05-10
Payer: MEDICARE

## 2021-05-10 ENCOUNTER — HOSPITAL ENCOUNTER (EMERGENCY)
Age: 71
Discharge: HOME | End: 2021-05-10
Payer: MEDICARE

## 2021-05-10 VITALS
HEIGHT: 61 IN | OXYGEN SATURATION: 95 % | RESPIRATION RATE: 18 BRPM | WEIGHT: 259 LBS | HEART RATE: 74 BPM | BODY MASS INDEX: 48.9 KG/M2 | TEMPERATURE: 97.1 F | DIASTOLIC BLOOD PRESSURE: 84 MMHG | SYSTOLIC BLOOD PRESSURE: 136 MMHG

## 2021-05-10 DIAGNOSIS — M25.562 ACUTE PAIN OF BOTH KNEES: ICD-10-CM
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
## 2021-05-10 DIAGNOSIS — M25.561 ACUTE PAIN OF BOTH KNEES: ICD-10-CM

## 2021-05-10 DIAGNOSIS — M17.10 ARTHRITIS OF KNEE: ICD-10-CM

## 2021-05-10 PROCEDURE — 73560 X-RAY EXAM OF KNEE 1 OR 2: CPT

## 2021-05-10 PROCEDURE — 99211 OFF/OP EST MAY X REQ PHY/QHP: CPT

## 2021-05-10 RX ORDER — VITAMIN E 268 MG
400 CAPSULE ORAL DAILY
COMMUNITY
End: 2022-10-14 | Stop reason: ALTCHOICE

## 2021-05-10 ASSESSMENT — PAIN - FUNCTIONAL ASSESSMENT
PAIN_FUNCTIONAL_ASSESSMENT: ACTIVITIES ARE NOT PREVENTED
PAIN_FUNCTIONAL_ASSESSMENT: 0-10
PAIN_FUNCTIONAL_ASSESSMENT: ACTIVITIES ARE NOT PREVENTED

## 2021-05-10 ASSESSMENT — PAIN DESCRIPTION - ORIENTATION: ORIENTATION: RIGHT;LEFT

## 2021-05-10 ASSESSMENT — PAIN DESCRIPTION - ONSET
ONSET: ON-GOING
ONSET: ON-GOING

## 2021-05-10 ASSESSMENT — PAIN DESCRIPTION - FREQUENCY
FREQUENCY: CONTINUOUS
FREQUENCY: CONTINUOUS

## 2021-05-10 ASSESSMENT — PAIN SCALES - GENERAL
PAINLEVEL_OUTOF10: 6
PAINLEVEL_OUTOF10: 6

## 2021-05-10 ASSESSMENT — PAIN DESCRIPTION - PROGRESSION: CLINICAL_PROGRESSION: NOT CHANGED

## 2021-05-10 ASSESSMENT — PAIN DESCRIPTION - LOCATION
LOCATION: KNEE
LOCATION: KNEE

## 2021-05-10 ASSESSMENT — PAIN DESCRIPTION - PAIN TYPE: TYPE: ACUTE PAIN

## 2021-05-10 NOTE — ED PROVIDER NOTES
Department of Emergency Medicine  [address] [place] [address]  Provider Note  Admit Date/Time: 5/10/2021  4:54 PM  Room:   NAME: Mercedes Santos  : 1950  MRN: [id number]     Chief Complaint:  Fall (Pt states \"I got up out of the chair & got Myself steady & My feet started to get going Backwards so I tried to grab Myself & I went face forward & I Tried to break My fall with My hands & I hit My head & My glasses went flying but didn't break\"  Pt \"Denies being on Any Blood thinners\" )    History of Present Illness        Mercedes Santos is a 66 y.o. female who has a past medical history of:   Past Medical History:   Diagnosis Date    Hypertension     Lymphedema     Metabolic syndrome     presents to the urgent care center by private car for relation of knee pain. She said this happened yesterday she said that she has trouble walking because of her balance and she said that she has chronic back pain and chronic knee problems and she did get her feet going right and she actually fell forward she said her nose hit the carpet but she did not really hit her head she does not have any headache or neck pain or facial pain at all. She said it is her knees she landed on her knees is what happened. And she said that she is having pain especially in the left knee she just wants to make sure there was nothing acute on the x-rays. She is seeing [facility] for severe arthritis in both knees and is getting injections every 3 months. ROS    Pertinent positives and negatives are stated within HPI, all other systems reviewed and are negative. Past Surgical History:   Procedure Laterality Date    ABDOMEN SURGERY      VEIN SURGERY     Social History:  reports that she has quit smoking. She has never used smokeless tobacco. She reports current alcohol use. She reports that she does not use drugs. Family History: family history includes Alzheimer's Disease in her father;  Anemia in her maternal grandmother; Diabetes in her paternal grandmother; Heart Failure in her mother. Allergies: Patient has no known allergies. Physical Exam   Oxygen Saturation Interpretation: Normal.   ED Triage Vitals [05/10/21 1657]   BP Temp Temp Source Pulse Resp SpO2 Height Weight   136/84 97.1 °F (36.2 °C) Temporal 74 18 95 % 5' 1\" (1.549 m) 259 lb (117.5 kg)       Physical Exam  · Constitutional/General: Alert and oriented x3, well appearing, non toxic in NAD  · HEENT:  NC/NT. Clear conjunctiva,    · Neck: Supple, full ROM, non tender to palpation in the midline,   · Respiratory: Lungs clear to auscultation bilaterally, no wheezes, rales, or rhonchi. Not in respiratory distress  · CV:  Regular rate. Regular rhythm. No murmurs, gallops, or rubs. Chronic edema of the bilateral lower extremities 4+. · Musculoskeletal: Moves all extremities x 4. Can ambulate normally she has crepitus with range of motion of both knees she has some ecchymosis on the right knee, there is no erythema or abnormal warmth. · Integument: skin warm and dry. No rashes. · Neurologic: GCS 15, no focal deficits,   · Psychiatric: Normal Affect    Lab / Imaging Results   (All laboratory and radiology results have been personally reviewed by myself)  Labs:  No results found for this visit on 05/10/21. Imaging: All Radiology results interpreted by Radiologist unless otherwise noted. XR KNEE LEFT (1-2 VIEWS)   Final Result   Advanced left knee osteoarthritis, primarily involving the medial and lateral   compartments. No acute fracture or dislocation. No effusion. XR KNEE RIGHT (1-2 VIEWS)   Final Result   Advanced tricompartmental right knee osteoarthritis. No acute fracture or   dislocation. No effusion. ED Course / Medical Decision Making   Medications - No data to display     MDM:   I did x-ray both knees. And they were negative for fracture she does have advanced arthritis in both knees.   She sees orthopedics for that she will follow up with Dr. Farzaneh Aguirre. She said she did not want anything for pain she does takes aspirin and she will continue to take that. Said she mainly just wanted to make sure she did not break anything. She has no signs of any head injury she does not have any headache nausea vomiting lethargy or any other complaints she said mainly she just hit her nose on the carpet her weight went onto her knees. Assessment      1. Fall, initial encounter    2. Acute pain of both knees    3. Arthritis of knee      Plan   Discharge to home and advised to contact MD Genaro Teixeira [address] [place] [address] [phone]      As needed    [facility], [address]  [phone]         Patient condition is good    New Medications     New Prescriptions    No medications on file     Electronically signed by TEMO Anthony CNP   DD: 5/10/21  **This report was transcribed using voice recognition software. Every effort was made to ensure accuracy; however, inadvertent computerized transcription errors may be present.   END OF ED PROVIDER NOTE     TEMO Anthony CNP  05/10/21 9975

## 2021-05-11 ENCOUNTER — TELEPHONE (OUTPATIENT)
Dept: ORTHOPEDIC SURGERY | Age: 71
End: 2021-05-11

## 2021-05-11 DIAGNOSIS — M17.12 PRIMARY OSTEOARTHRITIS OF LEFT KNEE: ICD-10-CM

## 2021-05-11 DIAGNOSIS — M17.11 PRIMARY OSTEOARTHRITIS OF RIGHT KNEE: Primary | ICD-10-CM

## 2021-05-11 NOTE — TELEPHONE ENCOUNTER
Patient called in asking if we can do an order for PT to help strengthen her knees and she would also like a quad cane to help with stability when walking.     Please advise

## 2021-06-08 ENCOUNTER — OFFICE VISIT (OUTPATIENT)
Dept: ORTHOPEDIC SURGERY | Age: 71
End: 2021-06-08
Payer: MEDICARE

## 2021-06-08 VITALS — BODY MASS INDEX: 48.71 KG/M2 | WEIGHT: 258 LBS | HEIGHT: 61 IN

## 2021-06-08 DIAGNOSIS — M17.12 PRIMARY OSTEOARTHRITIS OF LEFT KNEE: ICD-10-CM

## 2021-06-08 DIAGNOSIS — M17.11 PRIMARY OSTEOARTHRITIS OF RIGHT KNEE: Primary | ICD-10-CM

## 2021-06-08 PROCEDURE — 99213 OFFICE O/P EST LOW 20 MIN: CPT | Performed by: ORTHOPAEDIC SURGERY

## 2021-06-08 PROCEDURE — G8417 CALC BMI ABV UP PARAM F/U: HCPCS | Performed by: ORTHOPAEDIC SURGERY

## 2021-06-08 PROCEDURE — 3017F COLORECTAL CA SCREEN DOC REV: CPT | Performed by: ORTHOPAEDIC SURGERY

## 2021-06-08 PROCEDURE — 4040F PNEUMOC VAC/ADMIN/RCVD: CPT | Performed by: ORTHOPAEDIC SURGERY

## 2021-06-08 PROCEDURE — 1123F ACP DISCUSS/DSCN MKR DOCD: CPT | Performed by: ORTHOPAEDIC SURGERY

## 2021-06-08 PROCEDURE — 1090F PRES/ABSN URINE INCON ASSESS: CPT | Performed by: ORTHOPAEDIC SURGERY

## 2021-06-08 PROCEDURE — G8427 DOCREV CUR MEDS BY ELIG CLIN: HCPCS | Performed by: ORTHOPAEDIC SURGERY

## 2021-06-08 PROCEDURE — 1036F TOBACCO NON-USER: CPT | Performed by: ORTHOPAEDIC SURGERY

## 2021-06-08 PROCEDURE — G8400 PT W/DXA NO RESULTS DOC: HCPCS | Performed by: ORTHOPAEDIC SURGERY

## 2021-06-08 PROCEDURE — 20610 DRAIN/INJ JOINT/BURSA W/O US: CPT | Performed by: ORTHOPAEDIC SURGERY

## 2021-06-08 RX ORDER — TRIAMCINOLONE ACETONIDE 40 MG/ML
40 INJECTION, SUSPENSION INTRA-ARTICULAR; INTRAMUSCULAR ONCE
Status: COMPLETED | OUTPATIENT
Start: 2021-06-08 | End: 2021-06-08

## 2021-06-08 RX ADMIN — TRIAMCINOLONE ACETONIDE 40 MG: 40 INJECTION, SUSPENSION INTRA-ARTICULAR; INTRAMUSCULAR at 11:27

## 2021-06-08 RX ADMIN — TRIAMCINOLONE ACETONIDE 40 MG: 40 INJECTION, SUSPENSION INTRA-ARTICULAR; INTRAMUSCULAR at 11:28

## 2021-06-08 NOTE — PROGRESS NOTES
 Worried About [facility] in the Last Year:    [address] in the Last Year:    Transportation Needs:     Lack of Transportation (Medical):  Lack of Transportation (Non-Medical):    Physical Activity:     Days of Exercise per Week:     Minutes of Exercise per Session:    Stress:     Feeling of Stress :    Social Connections:     Frequency of Communication with Friends and Family:     Frequency of Social Gatherings with Friends and Family:     Attends [employer] Services:     Active Member of Clubs or Organizations:     Attends Club or Organization Meetings:     Marital Status:    Intimate Partner Violence:     Fear of Current or Ex-Partner:     Emotionally Abused:     Physically Abused:     Sexually Abused:      Family History   Problem Relation Age of Onset    Heart Failure Mother     Alzheimer's Disease Father     Anemia Maternal Grandmother     Diabetes Paternal Grandmother        Review of Systems:     Skin: (-) rash,(-) psoriasis,(-) eczema, (-)skin cancer. Musculoskeletal: (-) fractures,  (-) dislocations,(-) collagen vascular disease, (-) fibromyalgia, (-) multiple sclerosis, (-) muscular dystrophy, (-) RSD,(-) joint pain (-)swelling, (-) joint pain,swelling. Neurologic: (-) epilepsy, (-)seizures,(-) brain tumor,(-) TIA, (-)stroke, (-)headaches, (-)Parkinson disease,(-) memory loss, (-) LOC. Cardiovascular: (-) Chest pain, (-) swelling in legs/feet, (-) SOB, (-) cramping in legs/feet with walking. Constitutional:  The patient is alert and oriented x 3, appears to be stated age and in no distress. Ht 5' 1\" (1.549 m)   Wt 258 lb (117 kg)   BMI 48.75 kg/m²     Skin:  Upon inspection: the skin appears warm, dry and intact. There is not a previous scar over the affected area. There is not any cellulitis, lymphedema or cutaneous lesions noted in the lower extremities. Upon palpation there is no induration noted.       Neurologic:  Gait: normal;  Motor exam of the lower extremities show ; quadriceps, hamstrings, foot dorsi and plantar flexors intact R.  5/5 and L. 5/5. Deep tendon reflexes are 2/4 at the knees and 2/4 at the ankles with strong extensor hallicus longus motor strength bilaterally. Sensory to both feet is intact to all sensory roots. Cardiovascular: The vascular exam is normal and is well perfused to distal extremities. Distal pulses DP/PT: R. 2+; L. 2+. There is cap refill noted less than two seconds in all digits. There is not edema of the bilateral lower extremities. There is not varicosities noted in the distal extremities. Lymph:  Upon palpation,  there is no lymphadenopathy noted in bilateral lower extremities. Musculoskeletal:  Gait: antalgic; examination of the nails and digits reveal no cyanosis or clubbing    Lumbar exam:  On visual inspection, there is no deformity of the spine. full range of motion, no tenderness, palpable spasm or pain on motion. Special tests: Straight Leg Raise negative, Julia testnegative. Hip exam:  Upon inspection, there is no deformity noted. Upon palpation there is not tenderness. ROM: is   full and semetrical.   Strength: Hip Flexors 5/5; Hip Abductors 5/5; Hip Adduction 5/5. Knee exam:  Bilateral knee exam shows;  range of motion of R. Knee is 0 to 90, and L. Knee is 0 to 90. She does have  pain on motion, effusion is mild, there is tenderness over the  medial region, there are not any masses, there is not ligamentous instability, there is  deformity noted. Knee exam: bilateral positive for moderate crepitations, some mild tenderness laxity is not noted with  stress.       R. Knee:  Lachman's negative, Anterior Drawer negative, Posterior Drawer negative  Flora's negative, Thallasy  negative,   PF grind test negative, Apprehension test negative, Patellar J sign  negative  L. Knee:  Lachman's negative, Anterior Drawer negative, Posterior Drawer negative  Flora's negative, Thallasy  negative,   PF grind test negative, Apprehension test negative,  Patellar J sign  negative    Xrays:   Impression    Advanced tricompartmental degenerative changes of the right knee with    bone-on-bone articulation. MRI:   n/a  Radiographic findings reviewed with patient    Impression:  Encounter Diagnoses   Name Primary?  Primary osteoarthritis of right knee Yes    Primary osteoarthritis of left knee        Plan:   Natural history and expected course discussed. Questions answered. Educational materials distributed. Rest, ice, compression, and elevation (RICE) therapy. Reduction in offending activity. Patellar compression sleeve. I will proceed with a cortisone injection in the Bilateral knees. Verbal and written consent was obtained for the injections. Skin was prepped with alcohol. 1 ml of Kenalog 40mg and 9 ml of 0.25% Marcaine was  injected to Bilateral knees. The injections were given through the lateral side of the knees. The patient tolerated the injections well. I will see the patient back 2 months.

## 2021-08-10 ENCOUNTER — OFFICE VISIT (OUTPATIENT)
Dept: ORTHOPEDIC SURGERY | Age: 71
End: 2021-08-10
Payer: MEDICARE

## 2021-08-10 VITALS — HEIGHT: 61 IN | BODY MASS INDEX: 47.18 KG/M2 | WEIGHT: 249.9 LBS | TEMPERATURE: 98 F

## 2021-08-10 DIAGNOSIS — M17.12 PRIMARY OSTEOARTHRITIS OF LEFT KNEE: ICD-10-CM

## 2021-08-10 DIAGNOSIS — M17.11 PRIMARY OSTEOARTHRITIS OF RIGHT KNEE: Primary | ICD-10-CM

## 2021-08-10 PROCEDURE — 4040F PNEUMOC VAC/ADMIN/RCVD: CPT | Performed by: ORTHOPAEDIC SURGERY

## 2021-08-10 PROCEDURE — 1123F ACP DISCUSS/DSCN MKR DOCD: CPT | Performed by: ORTHOPAEDIC SURGERY

## 2021-08-10 PROCEDURE — 3017F COLORECTAL CA SCREEN DOC REV: CPT | Performed by: ORTHOPAEDIC SURGERY

## 2021-08-10 PROCEDURE — 1036F TOBACCO NON-USER: CPT | Performed by: ORTHOPAEDIC SURGERY

## 2021-08-10 PROCEDURE — 1090F PRES/ABSN URINE INCON ASSESS: CPT | Performed by: ORTHOPAEDIC SURGERY

## 2021-08-10 PROCEDURE — G8417 CALC BMI ABV UP PARAM F/U: HCPCS | Performed by: ORTHOPAEDIC SURGERY

## 2021-08-10 PROCEDURE — 20610 DRAIN/INJ JOINT/BURSA W/O US: CPT | Performed by: ORTHOPAEDIC SURGERY

## 2021-08-10 PROCEDURE — G8400 PT W/DXA NO RESULTS DOC: HCPCS | Performed by: ORTHOPAEDIC SURGERY

## 2021-08-10 PROCEDURE — G8427 DOCREV CUR MEDS BY ELIG CLIN: HCPCS | Performed by: ORTHOPAEDIC SURGERY

## 2021-08-10 PROCEDURE — 99213 OFFICE O/P EST LOW 20 MIN: CPT | Performed by: ORTHOPAEDIC SURGERY

## 2021-08-10 NOTE — PROGRESS NOTES
Chief Complaint   Patient presents with    Knee Pain     b/l knee pain f/u has been doing PT and has helped her, has some stiffness        [facility] returns today for follow-up of her bilateral knee pain. she reports this is worse than when I saw her last.  The patient's pain level is a 8/10. The previous treatment was not successful. Past Medical History:   Diagnosis Date    Hypertension     Lymphedema     Metabolic syndrome      Past Surgical History:   Procedure Laterality Date    ABDOMEN SURGERY  1984    VEIN SURGERY         Current Outpatient Medications:     Garlic 10 MG CAPS, Take by mouth, Disp: , Rfl:     vitamin E 400 UNIT capsule, Take 400 Units by mouth daily, Disp: , Rfl:     B Complex Vitamins (B-COMPLEX/B-12 PO), Take by mouth, Disp: , Rfl:     lisinopril (PRINIVIL;ZESTRIL) 20 MG tablet, , Disp: , Rfl:     Multiple Vitamins-Minerals (CENTRUM PO), Take  by mouth., Disp: , Rfl:     Calcium-Vitamin D (CALTRATE 600 PLUS-VIT D PO), Take  by mouth daily. , Disp: , Rfl:     Fish Oil-Cholecalciferol (FISH OIL + D3 PO), Take  by mouth 3 times daily. , Disp: , Rfl:     aspirin 325 MG tablet, Take 325 mg by mouth daily. , Disp: , Rfl:   No Known Allergies  Social History     Socioeconomic History    Marital status: Single     Spouse name: Not on file    Number of children: Not on file    Years of education: Not on file    Highest education level: Not on file   Occupational History    Not on file   Tobacco Use    Smoking status: Former Smoker    Smokeless tobacco: Never Used   Vaping Use    Vaping Use: Never used   Substance and Sexual Activity    Alcohol use: Yes     Comment: social    Drug use: No    Sexual activity: Not Currently     Partners: Male   Other Topics Concern    Not on file   Social History Narrative    Not on file     Social Determinants of Health     Financial Resource Strain:     Difficulty of Paying Living Expenses:    Food Insecurity:     Worried About Running Out of Food in the Last Year:    [address] in the Last Year:    Transportation Needs:     Lack of Transportation (Medical):  Lack of Transportation (Non-Medical):    Physical Activity:     Days of Exercise per Week:     Minutes of Exercise per Session:    Stress:     Feeling of Stress :    Social Connections:     Frequency of Communication with Friends and Family:     Frequency of Social Gatherings with Friends and Family:     Attends [employer] Services:     Active Member of Clubs or Organizations:     Attends Club or Organization Meetings:     Marital Status:    Intimate Partner Violence:     Fear of Current or Ex-Partner:     Emotionally Abused:     Physically Abused:     Sexually Abused:      Family History   Problem Relation Age of Onset    Heart Failure Mother     Alzheimer's Disease Father     Anemia Maternal Grandmother     Diabetes Paternal Grandmother        Review of Systems:     Skin: (-) rash,(-) psoriasis,(-) eczema, (-)skin cancer. Musculoskeletal: (-) fractures,  (-) dislocations,(-) collagen vascular disease, (-) fibromyalgia, (-) multiple sclerosis, (-) muscular dystrophy, (-) RSD,(-) joint pain (-)swelling, (-) joint pain,swelling. Neurologic: (-) epilepsy, (-)seizures,(-) brain tumor,(-) TIA, (-)stroke, (-)headaches, (-)Parkinson disease,(-) memory loss, (-) LOC. Cardiovascular: (-) Chest pain, (-) swelling in legs/feet, (-) SOB, (-) cramping in legs/feet with walking. Constitutional:  The patient is alert and oriented x 3, appears to be stated age and in no distress. Temp 98 °F (36.7 °C)   Ht 5' 1\" (1.549 m)   Wt 249 lb 14.4 oz (113.4 kg)   BMI 47.22 kg/m²     Skin:  Upon inspection: the skin appears warm, dry and intact. There is not a previous scar over the affected area. There is not any cellulitis, lymphedema or cutaneous lesions noted in the lower extremities. Upon palpation there is no induration noted.       Neurologic:  Gait: normal;  Motor exam of the lower extremities show ; quadriceps, hamstrings, foot dorsi and plantar flexors intact R.  5/5 and L. 5/5. Deep tendon reflexes are 2/4 at the knees and 2/4 at the ankles with strong extensor hallicus longus motor strength bilaterally. Sensory to both feet is intact to all sensory roots. Cardiovascular: The vascular exam is normal and is well perfused to distal extremities. Distal pulses DP/PT: R. 2+; L. 2+. There is cap refill noted less than two seconds in all digits. There is not edema of the bilateral lower extremities. There is not varicosities noted in the distal extremities. Lymph:  Upon palpation,  there is no lymphadenopathy noted in bilateral lower extremities. Musculoskeletal:  Gait: antalgic; examination of the nails and digits reveal no cyanosis or clubbing    Lumbar exam:  On visual inspection, there is no deformity of the spine. full range of motion, no tenderness, palpable spasm or pain on motion. Special tests: Straight Leg Raise negative, Julia testnegative. Hip exam:  Upon inspection, there is no deformity noted. Upon palpation there is not tenderness. ROM: is   full and semetrical.   Strength: Hip Flexors 5/5; Hip Abductors 5/5; Hip Adduction 5/5. Knee exam:  Bilateral knee exam shows;  range of motion of R. Knee is 0 to 90, and L. Knee is 0 to 90. She does have  pain on motion, effusion is mild, there is tenderness over the  medial region, there are not any masses, there is not ligamentous instability, there is  deformity noted.  Knee exam: bilateral positive for moderate crepitations, some mild tenderness laxity is not noted with  stress.       R. Knee:  Lachman's negative, Anterior Drawer negative, Posterior Drawer negative  Flora's negative, Thallasy  negative,   PF grind test negative, Apprehension test negative, Patellar J sign  negative  L. Knee:  Lachman's negative, Anterior Drawer negative, Posterior Drawer negative  Flora's negative, Thallasy

## 2021-08-24 ENCOUNTER — HOSPITAL ENCOUNTER (OUTPATIENT)
Age: 71
Discharge: HOME | End: 2021-08-26
Payer: MEDICARE

## 2021-08-24 PROCEDURE — U0005 INFEC AGEN DETEC AMPLI PROBE: HCPCS

## 2021-08-24 PROCEDURE — U0003 INFECTIOUS AGENT DETECTION BY NUCLEIC ACID (DNA OR RNA); SEVERE ACUTE RESPIRATORY SYNDROME CORONAVIRUS 2 (SARS-COV-2) (CORONAVIRUS DISEASE [COVID-19]), AMPLIFIED PROBE TECHNIQUE, MAKING USE OF HIGH THROUGHPUT TECHNOLOGIES AS DESCRIBED BY CMS-2020-01-R: HCPCS

## 2021-11-09 ENCOUNTER — OFFICE VISIT (OUTPATIENT)
Dept: ORTHOPEDIC SURGERY | Age: 71
End: 2021-11-09
Payer: MEDICARE

## 2021-11-09 VITALS — HEIGHT: 61 IN | WEIGHT: 252 LBS | BODY MASS INDEX: 47.58 KG/M2

## 2021-11-09 DIAGNOSIS — M17.12 PRIMARY OSTEOARTHRITIS OF LEFT KNEE: ICD-10-CM

## 2021-11-09 DIAGNOSIS — M17.11 PRIMARY OSTEOARTHRITIS OF RIGHT KNEE: Primary | ICD-10-CM

## 2021-11-09 PROCEDURE — 20610 DRAIN/INJ JOINT/BURSA W/O US: CPT | Performed by: ORTHOPAEDIC SURGERY

## 2021-11-09 NOTE — PROGRESS NOTES
Chief Complaint   Patient presents with    Knee Pain     Bilateral knee pain follow up. I will proceed with a cortisone injection in the Bilateral knee. Verbal and written consent was obtained for the injections. The skin was prepped with alcohol. A prepared mixture of 32 mg of Zilretta and 5mL diluent was injected to Bilateral knee. The injection was given through the lateral side of the knee. The patient tolerated the injection well. I will see the patient back 3 months. Shabbir Devin was seen today for knee pain.     Diagnoses and all orders for this visit:    Primary osteoarthritis of right knee  -     [place] ARTHROCENTESIS ASPIR&/INJ MAJOR JT/BURSA W/O US    Primary osteoarthritis of left knee  -     [place] ARTHROCENTESIS ASPIR&/INJ MAJOR JT/BURSA W/O US    Other orders  -     triamcinolone acetonide (ZILRETTA) intra-articular injection 32 mg  -     triamcinolone acetonide (ZILRETTA) intra-articular injection 32 mg

## 2021-11-15 ENCOUNTER — IMMUNIZATION (OUTPATIENT)
Dept: PRIMARY CARE CLINIC | Age: 71
End: 2021-11-15
Payer: MEDICARE

## 2021-11-15 PROCEDURE — 91306 COVID-19, MODERNA BOOSTER VACCINE 0.25ML DOSE: CPT | Performed by: NURSE PRACTITIONER

## 2021-11-15 PROCEDURE — 0064A COVID-19, MODERNA BOOSTER VACCINE 0.25ML DOSE: CPT | Performed by: NURSE PRACTITIONER

## 2021-12-01 DIAGNOSIS — M48.062 SPINAL STENOSIS OF LUMBAR REGION WITH NEUROGENIC CLAUDICATION: Primary | ICD-10-CM

## 2022-02-17 ENCOUNTER — OFFICE VISIT (OUTPATIENT)
Dept: ORTHOPEDIC SURGERY | Age: 72
End: 2022-02-17
Payer: MEDICARE

## 2022-02-17 DIAGNOSIS — M17.12 PRIMARY OSTEOARTHRITIS OF LEFT KNEE: ICD-10-CM

## 2022-02-17 DIAGNOSIS — M17.11 PRIMARY OSTEOARTHRITIS OF RIGHT KNEE: Primary | ICD-10-CM

## 2022-02-17 PROCEDURE — 20610 DRAIN/INJ JOINT/BURSA W/O US: CPT | Performed by: ORTHOPAEDIC SURGERY

## 2022-02-18 NOTE — PROGRESS NOTES
Chief Complaint   Patient presents with    Knee Pain     f/u bilateral knee pain. Zilretta injections. I will proceed with a cortisone injection in the Bilateral knee. Verbal and written consent was obtained for the injections. The skin was prepped with alcohol. A prepared mixture of 32 mg of Zilretta and 5mL diluent was injected to Bilateral knee. The injection was given through the lateral side of the knee. The patient tolerated the injection well. I will see the patient back prn. Letty Barnes was seen today for knee pain.     Diagnoses and all orders for this visit:    Primary osteoarthritis of right knee    Primary osteoarthritis of left knee

## 2022-03-09 DIAGNOSIS — M79.641 RIGHT HAND PAIN: Primary | ICD-10-CM

## 2022-05-10 ENCOUNTER — OFFICE VISIT (OUTPATIENT)
Dept: PHYSICAL MEDICINE AND REHAB | Age: 72
End: 2022-05-10
Payer: MEDICARE

## 2022-05-10 VITALS
BODY MASS INDEX: 49.28 KG/M2 | DIASTOLIC BLOOD PRESSURE: 77 MMHG | HEIGHT: 61 IN | WEIGHT: 261 LBS | SYSTOLIC BLOOD PRESSURE: 145 MMHG | HEART RATE: 80 BPM

## 2022-05-10 DIAGNOSIS — R26.9 GAIT DIFFICULTY: ICD-10-CM

## 2022-05-10 DIAGNOSIS — G89.29 CHRONIC LEFT-SIDED LOW BACK PAIN WITH LEFT-SIDED SCIATICA: Primary | ICD-10-CM

## 2022-05-10 DIAGNOSIS — M47.816 LUMBAR SPONDYLOSIS: ICD-10-CM

## 2022-05-10 DIAGNOSIS — M54.17 RADICULOPATHY, LUMBOSACRAL REGION: ICD-10-CM

## 2022-05-10 DIAGNOSIS — M54.42 CHRONIC LEFT-SIDED LOW BACK PAIN WITH LEFT-SIDED SCIATICA: Primary | ICD-10-CM

## 2022-05-10 DIAGNOSIS — I89.0 LYMPHEDEMA: ICD-10-CM

## 2022-05-10 PROCEDURE — G8417 CALC BMI ABV UP PARAM F/U: HCPCS | Performed by: PHYSICAL MEDICINE & REHABILITATION

## 2022-05-10 PROCEDURE — 3017F COLORECTAL CA SCREEN DOC REV: CPT | Performed by: PHYSICAL MEDICINE & REHABILITATION

## 2022-05-10 PROCEDURE — 1090F PRES/ABSN URINE INCON ASSESS: CPT | Performed by: PHYSICAL MEDICINE & REHABILITATION

## 2022-05-10 PROCEDURE — 99214 OFFICE O/P EST MOD 30 MIN: CPT | Performed by: PHYSICAL MEDICINE & REHABILITATION

## 2022-05-10 PROCEDURE — G8427 DOCREV CUR MEDS BY ELIG CLIN: HCPCS | Performed by: PHYSICAL MEDICINE & REHABILITATION

## 2022-05-10 PROCEDURE — G8400 PT W/DXA NO RESULTS DOC: HCPCS | Performed by: PHYSICAL MEDICINE & REHABILITATION

## 2022-05-10 PROCEDURE — 1036F TOBACCO NON-USER: CPT | Performed by: PHYSICAL MEDICINE & REHABILITATION

## 2022-05-10 PROCEDURE — 4040F PNEUMOC VAC/ADMIN/RCVD: CPT | Performed by: PHYSICAL MEDICINE & REHABILITATION

## 2022-05-10 PROCEDURE — 1123F ACP DISCUSS/DSCN MKR DOCD: CPT | Performed by: PHYSICAL MEDICINE & REHABILITATION

## 2022-05-10 NOTE — PROGRESS NOTES
Ernestina Calderon, [id number] [facility]  [address]  Phone: [phone]  Fax: [phone]    PCP: Juan Luis Seals MD  Date of visit: 5/10/22    Chief Complaint   Patient presents with    Back Pain     follow up     Interval:   Patient presents today for follow up visit today regarding low back pain, and trouble walking. She was unable to get around much this winter due to instability with walking. She would like to restart PT to help with this. She reports left sided low back pain that radiates into the left leg- L4, L5 distributions. She reports worsening lymphedema in both legs. Wants to go back to PT. The pain is rated Pain Score:   5, is described as achy. The pain is worse with activity. Better with sitting down. There is  Numbness/tingling in her left leg. There is no weakness. There is no bowel/bladder changes. The prior workup has included: Xray     The prior treatment has included:  PT: yes and she continues HEP diligently. Does not want medications. No Known Allergies    Current Outpatient Medications   Medication Sig Dispense Refill    Garlic 10 MG CAPS Take by mouth      vitamin E 400 UNIT capsule Take 400 Units by mouth daily      B Complex Vitamins (B-COMPLEX/B-12 PO) Take by mouth      lisinopril (PRINIVIL;ZESTRIL) 20 MG tablet       Multiple Vitamins-Minerals (CENTRUM PO) Take  by mouth.  Calcium-Vitamin D (CALTRATE 600 PLUS-VIT D PO) Take  by mouth daily.  Fish Oil-Cholecalciferol (FISH OIL + D3 PO) Take  by mouth 3 times daily.  aspirin 325 MG tablet Take 325 mg by mouth daily. No current facility-administered medications for this visit.        Past Medical History:   Diagnosis Date    Hypertension     Lymphedema     Metabolic syndrome        Past Surgical History:   Procedure Laterality Date    ABDOMEN SURGERY  1984    VEIN SURGERY         Family History   Problem Relation Age of Onset    Heart Failure Mother     Alzheimer's Disease Father     Anemia Maternal Grandmother     Diabetes Paternal Grandmother        Social History     Tobacco Use    Smoking status: Former Smoker    Smokeless tobacco: Never Used   Vaping Use    Vaping Use: Never used   Substance Use Topics    Alcohol use: Yes     Comment: social    Drug use: No          Functional Status: The patient is able to ambulate and perform activities of daily living with the use of an assistive device. Occupation: The patient is currently retired. ROS:    Constitutional: Denies fevers, chills, night sweats, unintentional weight loss     Skin: Denies rash or skin changes     Eyes: Denies vision changes    Ears/Nose/Throat: Denies nasal congestion or sore throat     Respiratory: Denies SOB or cough     Cardiovascular: Denies CP, palpitations, edema      Gastrointestinal: Denies abdominal pain,  N/V, constipation, or diarrhea    Genitourinary: Denies urinary symptoms    Neurologic: See HPI.     MSK: See HPI. Psychiatric: Denies sleep disturbance, anxiety, depression    Hematologic/Lymphatic/Immunologic: Denies bruising       Physical Exam:   Blood pressure (!) 145/77, pulse 80, height 5' 1\" (1.549 m), weight 261 lb (118.4 kg). General: well developed and well nourished in no acute distress. Body habitus is morbidly obese  HEENT: No rhinorrhea, sneezing, yawning, or lacrimation. No scleral icterus or conjunctival injection. Resp: symmetrical chest expansion, unlabored breathing, respirations unlabored. CV: Heart rate is regular. Peripheral pulses are palpable  Lymph: No visible regional lymphadenopathy. Skin: No rashes or ecchymosis. Normal turgor. Psych: Mood is calm. Affect is normal.   Ext: bilateral lower extremity lymphedema     MSK:   Back/Hip Exam:   Inspection: Pelvis was asymmetric. Lumbar lordotic curvature was increased. There was no scoliosis. No ecchymoses or erythema. Palpation: Palpatory exam revealed no tenderness along lumbosacral paraspinals, midline spine, ttp left SI joint sulcus, ttp left gluteus sanjiv. There was no paraspinal spasms. There were no trigger points. ROM: ROM decreased  Special/provocative testing:   SLR negative     Neurological Exam:  Strength:   R  L  Hip Flex  5  5  Knee Ext  5  5  Ankle dorsi  5  5  EHL   5 5-  Ankle Plantar  5  5    Sensory:  Intact for light touch in all lower extremity dermatomes. Reflexes:   R  L  Patellar  (0) (0)  Ankle Jerk  (0) (0)      Gait is Antalgic. Imaging: (personally reviewed by me 05/10/22)  X-ray L spine     Impression:   Van Duque is a 74 y.o. female     1. Chronic left-sided low back pain with left-sided sciatica    2. Lumbar spondylosis    3. Lymphedema    4. Gait difficulty    5. Radiculopathy, lumbosacral region    6. Body mass index (BMI) 45.0-49.9, adult (HCC)        Plan:   · Will place new referral to PT   · Obtain lumbar MRI to evaluate for cause of left L4, L5 radicular pain. · Discussed epidural once MRI done. · Recommend weight loss through healthy diet and exercise. · Follow up with ortho for knee injections.  The patient was educated about the diagnosis, prognosis, indications, risks and benefits of treatment. An opportunity to ask questions was given to the patient and questions were answered. The patient agreed to proceed with the recommended treatment as described above.       Follow up - will call after MRI complete to discuss epidural.       Tremaine Barbosa DO, [facility]   Board Certified Physical Medicine and Rehabilitation

## 2022-05-19 ENCOUNTER — OFFICE VISIT (OUTPATIENT)
Dept: ORTHOPEDIC SURGERY | Age: 72
End: 2022-05-19
Payer: MEDICARE

## 2022-05-19 DIAGNOSIS — M17.11 PRIMARY OSTEOARTHRITIS OF RIGHT KNEE: Primary | ICD-10-CM

## 2022-05-19 DIAGNOSIS — M17.12 PRIMARY OSTEOARTHRITIS OF LEFT KNEE: ICD-10-CM

## 2022-05-19 PROCEDURE — 20610 DRAIN/INJ JOINT/BURSA W/O US: CPT | Performed by: ORTHOPAEDIC SURGERY

## 2022-05-20 NOTE — PROGRESS NOTES
Chief Complaint   Patient presents with    Injections     bilateral knee Vianne Expose         I will proceed with a cortisone injection in the Bilateral knee. Verbal and written consent was obtained for the injections. The skin was prepped with alcohol. A prepared mixture of 32 mg of Zilretta and 5mL diluent was injected to Bilateral knee. The injection was given through the lateral side of the knee. The patient tolerated the injection well. I will see the patient back prn. Janeen Mondragon was seen today for injections.     Diagnoses and all orders for this visit:    Primary osteoarthritis of right knee  -     [place] ARTHROCENTESIS ASPIR&/INJ MAJOR JT/BURSA W/O US  -     triamcinolone acetonide (ZILRETTA) intra-articular injection 32 mg    Primary osteoarthritis of left knee  -     [place] ARTHROCENTESIS ASPIR&/INJ MAJOR JT/BURSA W/O US  -     triamcinolone acetonide (ZILRETTA) intra-articular injection 32 mg

## 2022-05-25 ENCOUNTER — TELEPHONE (OUTPATIENT)
Dept: PHYSICAL MEDICINE AND REHAB | Age: 72
End: 2022-05-25

## 2022-05-25 NOTE — TELEPHONE ENCOUNTER
Patient called back in and said she is unsure of the epidural at this time that she would like to do her therapy for a while and see if that helps before ordering the injection

## 2022-05-25 NOTE — TELEPHONE ENCOUNTER
Called and spoke with the patient to inform her of the results of her MRI. Patient stated that she would like to have epidural done. Please place order. Patient is aware and voiced understanding.

## 2022-08-25 ENCOUNTER — OFFICE VISIT (OUTPATIENT)
Dept: ORTHOPEDIC SURGERY | Age: 72
End: 2022-08-25
Payer: MEDICARE

## 2022-08-25 DIAGNOSIS — M17.12 PRIMARY OSTEOARTHRITIS OF LEFT KNEE: ICD-10-CM

## 2022-08-25 DIAGNOSIS — M17.11 PRIMARY OSTEOARTHRITIS OF RIGHT KNEE: Primary | ICD-10-CM

## 2022-08-25 PROCEDURE — 99999 PR OFFICE/OUTPT VISIT,PROCEDURE ONLY: CPT | Performed by: ORTHOPAEDIC SURGERY

## 2022-08-25 PROCEDURE — 20610 DRAIN/INJ JOINT/BURSA W/O US: CPT | Performed by: ORTHOPAEDIC SURGERY

## 2022-08-31 NOTE — PROGRESS NOTES
Chief Complaint   Patient presents with    Injections     Bilateral knee Crystal Bain        I will proceed with a cortisone injection in the Bilateral knee. Verbal and written consent was obtained for the injections. The skin was prepped with alcohol. A prepared mixture of 32 mg of Zilretta and 5mL diluent was injected to Bilateral knee. The injection was given through the lateral side of the knee. The patient tolerated the injection well. I will see the patient back prn. Juana Alas was seen today for injections.     Diagnoses and all orders for this visit:    Primary osteoarthritis of right knee  -     [place] ARTHROCENTESIS ASPIR&/INJ MAJOR JT/BURSA W/O US  -     [place] ARTHROCENTESIS ASPIR&/INJ MAJOR JT/BURSA W/O US    Primary osteoarthritis of left knee  -     triamcinolone acetonide (ZILRETTA) intra-articular injection 32 mg  -     triamcinolone acetonide (ZILRETTA) intra-articular injection 32 mg

## 2022-09-28 DIAGNOSIS — M17.12 PRIMARY OSTEOARTHRITIS OF LEFT KNEE: ICD-10-CM

## 2022-09-28 DIAGNOSIS — M17.11 PRIMARY OSTEOARTHRITIS OF RIGHT KNEE: Primary | ICD-10-CM

## 2022-10-14 ENCOUNTER — APPOINTMENT (OUTPATIENT)
Dept: GENERAL RADIOLOGY | Age: 72
DRG: 543 | End: 2022-10-14
Payer: MEDICARE

## 2022-10-14 ENCOUNTER — APPOINTMENT (OUTPATIENT)
Dept: CT IMAGING | Age: 72
DRG: 543 | End: 2022-10-14
Payer: MEDICARE

## 2022-10-14 ENCOUNTER — HOSPITAL ENCOUNTER (INPATIENT)
Age: 72
LOS: 4 days | Discharge: NURSING, REHAB OR LONG-TERM CARE | DRG: 543 | End: 2022-10-18
Attending: EMERGENCY MEDICINE | Admitting: INTERNAL MEDICINE
Payer: MEDICARE

## 2022-10-14 DIAGNOSIS — S82.142A CLOSED FRACTURE OF LEFT TIBIAL PLATEAU, INITIAL ENCOUNTER: ICD-10-CM

## 2022-10-14 DIAGNOSIS — R26.2 UNABLE TO AMBULATE: Primary | ICD-10-CM

## 2022-10-14 LAB
ALBUMIN SERPL-MCNC: 4.1 G/DL (ref 3.5–5.2)
ALP BLD-CCNC: 78 U/L (ref 35–104)
ALT SERPL-CCNC: 18 U/L (ref 0–32)
ANION GAP SERPL CALCULATED.3IONS-SCNC: 15 MMOL/L (ref 7–16)
AST SERPL-CCNC: 21 U/L (ref 0–31)
BASOPHILS ABSOLUTE: 0.04 E9/L (ref 0–0.2)
BASOPHILS RELATIVE PERCENT: 0.6 % (ref 0–2)
BILIRUB SERPL-MCNC: 0.7 MG/DL (ref 0–1.2)
BILIRUBIN DIRECT: <0.2 MG/DL (ref 0–0.3)
BILIRUBIN, INDIRECT: NORMAL MG/DL (ref 0–1)
BUN BLDV-MCNC: 18 MG/DL (ref 6–23)
CALCIUM SERPL-MCNC: 9.5 MG/DL (ref 8.6–10.2)
CHLORIDE BLD-SCNC: 98 MMOL/L (ref 98–107)
CO2: 22 MMOL/L (ref 22–29)
CREAT SERPL-MCNC: 0.6 MG/DL (ref 0.5–1)
EKG ATRIAL RATE: 66 BPM
EKG P AXIS: 42 DEGREES
EKG P-R INTERVAL: 182 MS
EKG Q-T INTERVAL: 400 MS
EKG QRS DURATION: 84 MS
EKG QTC CALCULATION (BAZETT): 419 MS
EKG R AXIS: 19 DEGREES
EKG T AXIS: 34 DEGREES
EKG VENTRICULAR RATE: 66 BPM
EOSINOPHILS ABSOLUTE: 0.05 E9/L (ref 0.05–0.5)
EOSINOPHILS RELATIVE PERCENT: 0.7 % (ref 0–6)
GFR AFRICAN AMERICAN: >60
GFR NON-AFRICAN AMERICAN: >60 ML/MIN/1.73
GLUCOSE BLD-MCNC: 117 MG/DL (ref 74–99)
HCT VFR BLD CALC: 41.4 % (ref 34–48)
HEMOGLOBIN: 14.1 G/DL (ref 11.5–15.5)
IMMATURE GRANULOCYTES #: 0.02 E9/L
IMMATURE GRANULOCYTES %: 0.3 % (ref 0–5)
LYMPHOCYTES ABSOLUTE: 1.75 E9/L (ref 1.5–4)
LYMPHOCYTES RELATIVE PERCENT: 24.3 % (ref 20–42)
MCH RBC QN AUTO: 31.3 PG (ref 26–35)
MCHC RBC AUTO-ENTMCNC: 34.1 % (ref 32–34.5)
MCV RBC AUTO: 91.8 FL (ref 80–99.9)
MONOCYTES ABSOLUTE: 0.58 E9/L (ref 0.1–0.95)
MONOCYTES RELATIVE PERCENT: 8 % (ref 2–12)
NEUTROPHILS ABSOLUTE: 4.77 E9/L (ref 1.8–7.3)
NEUTROPHILS RELATIVE PERCENT: 66.1 % (ref 43–80)
PDW BLD-RTO: 13.3 FL (ref 11.5–15)
PLATELET # BLD: 229 E9/L (ref 130–450)
PMV BLD AUTO: 8.8 FL (ref 7–12)
POTASSIUM REFLEX MAGNESIUM: 4 MMOL/L (ref 3.5–5)
RBC # BLD: 4.51 E12/L (ref 3.5–5.5)
SODIUM BLD-SCNC: 135 MMOL/L (ref 132–146)
TOTAL PROTEIN: 7.8 G/DL (ref 6.4–8.3)
WBC # BLD: 7.2 E9/L (ref 4.5–11.5)

## 2022-10-14 PROCEDURE — 80048 BASIC METABOLIC PNL TOTAL CA: CPT

## 2022-10-14 PROCEDURE — 1200000000 HC SEMI PRIVATE

## 2022-10-14 PROCEDURE — 73562 X-RAY EXAM OF KNEE 3: CPT

## 2022-10-14 PROCEDURE — 6370000000 HC RX 637 (ALT 250 FOR IP): Performed by: INTERNAL MEDICINE

## 2022-10-14 PROCEDURE — 93005 ELECTROCARDIOGRAM TRACING: CPT | Performed by: STUDENT IN AN ORGANIZED HEALTH CARE EDUCATION/TRAINING PROGRAM

## 2022-10-14 PROCEDURE — 73700 CT LOWER EXTREMITY W/O DYE: CPT

## 2022-10-14 PROCEDURE — 71045 X-RAY EXAM CHEST 1 VIEW: CPT

## 2022-10-14 PROCEDURE — 6360000002 HC RX W HCPCS: Performed by: INTERNAL MEDICINE

## 2022-10-14 PROCEDURE — 73590 X-RAY EXAM OF LOWER LEG: CPT

## 2022-10-14 PROCEDURE — 85025 COMPLETE CBC W/AUTO DIFF WBC: CPT

## 2022-10-14 PROCEDURE — 99285 EMERGENCY DEPT VISIT HI MDM: CPT

## 2022-10-14 PROCEDURE — 80076 HEPATIC FUNCTION PANEL: CPT

## 2022-10-14 RX ORDER — LANOLIN ALCOHOL/MO/W.PET/CERES
1000 CREAM (GRAM) TOPICAL DAILY
COMMUNITY

## 2022-10-14 RX ORDER — OMEGA-3 FATTY ACIDS/FISH OIL 300-1000MG
1 CAPSULE ORAL 3 TIMES DAILY
Status: ON HOLD | COMMUNITY
End: 2022-10-18 | Stop reason: HOSPADM

## 2022-10-14 RX ORDER — CHLORAL HYDRATE 500 MG
2000 CAPSULE ORAL 2 TIMES DAILY
COMMUNITY

## 2022-10-14 RX ORDER — ENOXAPARIN SODIUM 100 MG/ML
30 INJECTION SUBCUTANEOUS DAILY
Status: DISCONTINUED | OUTPATIENT
Start: 2022-10-14 | End: 2022-10-17

## 2022-10-14 RX ORDER — LISINOPRIL 20 MG/1
20 TABLET ORAL 2 TIMES DAILY
Status: DISCONTINUED | OUTPATIENT
Start: 2022-10-14 | End: 2022-10-18 | Stop reason: HOSPADM

## 2022-10-14 RX ORDER — MULTIVIT-MIN/IRON/FOLIC ACID/K 18-600-40
2000 CAPSULE ORAL 2 TIMES DAILY
COMMUNITY

## 2022-10-14 RX ORDER — VITAMIN B COMPLEX
1 CAPSULE ORAL
COMMUNITY

## 2022-10-14 RX ADMIN — LISINOPRIL 20 MG: 20 TABLET ORAL at 20:45

## 2022-10-14 RX ADMIN — ENOXAPARIN SODIUM 30 MG: 100 INJECTION SUBCUTANEOUS at 20:44

## 2022-10-14 ASSESSMENT — ENCOUNTER SYMPTOMS
WHEEZING: 0
NAUSEA: 0
SHORTNESS OF BREATH: 0
COUGH: 0
EYE DISCHARGE: 0
DIARRHEA: 0
BACK PAIN: 0
ABDOMINAL PAIN: 0
SINUS PRESSURE: 0
SORE THROAT: 0
VOMITING: 0
EYE PAIN: 0

## 2022-10-14 ASSESSMENT — PAIN SCALES - GENERAL
PAINLEVEL_OUTOF10: 0
PAINLEVEL_OUTOF10: 0

## 2022-10-14 NOTE — PLAN OF CARE
Problem: Discharge Planning  Goal: Discharge to home or other facility with appropriate resources  Outcome: Progressing  Flowsheets (Taken 10/14/2022 1530 by Shilpi Thompson RN)  Discharge to home or other facility with appropriate resources: Identify discharge learning needs (meds, wound care, etc)     Problem: Safety - Adult  Goal: Free from fall injury  Outcome: Progressing     Problem: ABCDS Injury Assessment  Goal: Absence of physical injury  Outcome: Progressing     Problem: Skin/Tissue Integrity  Goal: Absence of new skin breakdown  Description: 1. Monitor for areas of redness and/or skin breakdown  2. Assess vascular access sites hourly  3. Every 4-6 hours minimum:  Change oxygen saturation probe site  4. Every 4-6 hours:  If on nasal continuous positive airway pressure, respiratory therapy assess nares and determine need for appliance change or resting period.   Outcome: Progressing     Problem: Pain  Goal: Verbalizes/displays adequate comfort level or baseline comfort level  Outcome: Progressing

## 2022-10-14 NOTE — PROGRESS NOTES
Pharmacist Review and Automatic Dose Adjustment of Prophylactic Enoxaparin    *Review reason for admission/hospital problem list*    The reviewing pharmacist has made an adjustment to the ordered enoxaparin dose or converted to UFH per the approved [facility] protocol and table as identified below. Ashok Lakhani is a 74 y.o. female. Recent Labs     10/14/22  1236   CREATININE 0.6       Estimated Creatinine Clearance: 96 mL/min (based on SCr of 0.6 mg/dL). Recent Labs     10/14/22  1236   HGB 14.1   HCT 41.4        No results for input(s): INR in the last 72 hours. Height:   Ht Readings from Last 1 Encounters:   10/14/22 5' 1\" (1.549 m)     Weight:  Wt Readings from Last 1 Encounters:   10/14/22 248 lb (112.5 kg)               Plan: Based upon the patient's weight and renal function, the ordered enoxaparin dose of 40 mg daily has been changed/converted to 30 mg twice daily.       Thank you,  Nuris Shoemaker, [facility]  10/14/2022, 4:31 PM

## 2022-10-14 NOTE — ED PROVIDER NOTES
69-year-old female presenting emergency department for left leg and knee pain, felt a pop in her left knee the day prior and felt like she was unable to walk. Symptoms moderate to severe in severity, persistent, worse with ambulation, improved by Motrin, sudden onset. Follows with Dr. Alex Santana for orthopedic surgeons. Review of Systems   Constitutional:  Negative for chills and fever. HENT:  Negative for ear pain, sinus pressure and sore throat. Eyes:  Negative for pain and discharge. Respiratory:  Negative for cough, shortness of breath and wheezing. Cardiovascular:  Negative for chest pain. Gastrointestinal:  Negative for abdominal pain, diarrhea, nausea and vomiting. Genitourinary:  Negative for dysuria and frequency. Musculoskeletal:  Positive for arthralgias (Left knee pain). Negative for back pain. Skin:  Negative for rash and wound. Neurological:  Negative for weakness and headaches. Hematological:  Negative for adenopathy. All other systems reviewed and are negative. Physical Exam  Vitals and nursing note reviewed. Constitutional:       Appearance: Normal appearance. HENT:      Head: Normocephalic and atraumatic. Right Ear: External ear normal.      Left Ear: External ear normal.      Nose: Nose normal.      Mouth/Throat:      Mouth: Mucous membranes are moist.   Eyes:      Extraocular Movements: Extraocular movements intact. Pupils: Pupils are equal, round, and reactive to light. Cardiovascular:      Rate and Rhythm: Normal rate and regular rhythm. Pulses: Normal pulses. Heart sounds: Normal heart sounds. Pulmonary:      Effort: Pulmonary effort is normal.      Breath sounds: Normal breath sounds. Abdominal:      General: Abdomen is flat. Bowel sounds are normal.      Palpations: Abdomen is soft. Musculoskeletal:         General: Tenderness (Over the left tibia, compartments soft) present. Normal range of motion.       Cervical back: Normal range of motion and neck supple. Skin:     General: Skin is warm and dry. Neurological:      General: No focal deficit present. Mental Status: She is alert and oriented to person, place, and time. Cranial Nerves: No cranial nerve deficit. Sensory: No sensory deficit. Motor: No weakness. Procedures     MDM     Amount and/or Complexity of Data Reviewed  Clinical lab tests: reviewed  Tests in the radiology section of CPT®: reviewed  Tests in the medicine section of CPT®: reviewed         ED Course as of 10/19/22 1108   Fri Oct 14, 2022   1121 Patient appears to have a chronic tibial plateau but now she is unable to bear any weight on her knee, will consult orthopedic surgery, patient sees Dr. Vee Mcrae. [JG]   1243 EKG: This EKG is signed by emergency department physician. Rate: 66  Rhythm: Sinus  Interpretation: no acute changes  Comparison: was normal      [JG]   9960 Dr. Brian Lopez will admit patient [JG]   [phone] Ortho will provide consult on patient [JG]   [id number] 78-year-old female presenting to the emergency department for 2 days of leg pain, felt a pop in her left knee the day prior, was unable to bear weight today so she called EMS. Compartments soft, neurovascular intact in that leg, was able to range it somewhat, x-rays obtained which appear to show a worsening tibial plateau fracture on top of the already known chronic tibial plateau fracture. Consulted orthopedic surgery, they recommended admission to medicine, admitted by her primary care doctor. [JG]      ED Course User Index  [JG] Ave Burton MD      78-year-old female presenting to the emergency department for 2 days of leg pain, felt a pop in her left knee the day prior, was unable to bear weight today so she called EMS.   Compartments soft, neurovascular intact in that leg, was able to range it somewhat, x-rays obtained which appear to show a worsening tibial plateau fracture on top of the already known chronic tibial plateau fracture. Consulted orthopedic surgery, they recommended admission to medicine, admitted by her primary care doctor. ED Course as of 10/19/22 1108   Fri Oct 14, 2022   1121 Patient appears to have a chronic tibial plateau but now she is unable to bear any weight on her knee, will consult orthopedic surgery, patient sees Dr. Beth Oconnor. [JG]   1243 EKG: This EKG is signed by emergency department physician. Rate: 66  Rhythm: Sinus  Interpretation: no acute changes  Comparison: was normal      [JG]   7455 Dr. Kathy Garcia will admit patient [JG]   [phone] Ortho will provide consult on patient [JG]   [id number] 66-year-old female presenting to the emergency department for 2 days of leg pain, felt a pop in her left knee the day prior, was unable to bear weight today so she called EMS. Compartments soft, neurovascular intact in that leg, was able to range it somewhat, x-rays obtained which appear to show a worsening tibial plateau fracture on top of the already known chronic tibial plateau fracture. Consulted orthopedic surgery, they recommended admission to medicine, admitted by her primary care doctor. [JG]      ED Course User Index  [JG] Solo Snider MD       --------------------------------------------- PAST HISTORY ---------------------------------------------  Past Medical History:  has a past medical history of Hypertension, Lymphedema, and Metabolic syndrome. Past Surgical History:  has a past surgical history that includes Abdomen surgery (1984) and Vein Surgery. Social History:  reports that she has quit smoking. She has never used smokeless tobacco. She reports current alcohol use. She reports that she does not use drugs. Family History: family history includes Alzheimer's Disease in her father; Anemia in her maternal grandmother; Diabetes in her paternal grandmother; Heart Failure in her mother. The patients home medications have been reviewed.     Allergies: Patient has no known allergies.     -------------------------------------------------- RESULTS -------------------------------------------------    LABS:  Results for orders placed or performed during the hospital encounter of 10/14/22   CBC with Auto Differential   Result Value Ref Range    WBC 7.2 4.5 - 11.5 E9/L    RBC 4.51 3.50 - 5.50 E12/L    Hemoglobin 14.1 11.5 - 15.5 g/dL    Hematocrit 41.4 34.0 - 48.0 %    MCV 91.8 80.0 - 99.9 fL    MCH 31.3 26.0 - 35.0 pg    MCHC 34.1 32.0 - 34.5 %    RDW 13.3 11.5 - 15.0 fL    Platelets 472 243 - 514 E9/L    MPV 8.8 7.0 - 12.0 fL    Neutrophils % 66.1 43.0 - 80.0 %    Immature Granulocytes % 0.3 0.0 - 5.0 %    Lymphocytes % 24.3 20.0 - 42.0 %    Monocytes % 8.0 2.0 - 12.0 %    Eosinophils % 0.7 0.0 - 6.0 %    Basophils % 0.6 0.0 - 2.0 %    Neutrophils Absolute 4.77 1.80 - 7.30 E9/L    Immature Granulocytes # 0.02 E9/L    Lymphocytes Absolute 1.75 1.50 - 4.00 E9/L    Monocytes Absolute 0.58 0.10 - 0.95 E9/L    Eosinophils Absolute 0.05 0.05 - 0.50 E9/L    Basophils Absolute 0.04 0.00 - 0.20 Q1/W   Basic Metabolic Panel w/ Reflex to MG   Result Value Ref Range    Sodium 135 132 - 146 mmol/L    Potassium reflex Magnesium 4.0 3.5 - 5.0 mmol/L    Chloride 98 98 - 107 mmol/L    CO2 22 22 - 29 mmol/L    Anion Gap 15 7 - 16 mmol/L    Glucose 117 (H) 74 - 99 mg/dL    BUN 18 6 - 23 mg/dL    Creatinine 0.6 0.5 - 1.0 mg/dL    GFR Non-African American >60 >=60 mL/min/1.73    GFR African American >60     Calcium 9.5 8.6 - 10.2 mg/dL   Hepatic Function Panel   Result Value Ref Range    Total Protein 7.8 6.4 - 8.3 g/dL    Albumin 4.1 3.5 - 5.2 g/dL    Alkaline Phosphatase 78 35 - 104 U/L    ALT 18 0 - 32 U/L    AST 21 0 - 31 U/L    Total Bilirubin 0.7 0.0 - 1.2 mg/dL    Bilirubin, Direct <0.2 0.0 - 0.3 mg/dL    Bilirubin, Indirect see below 0.0 - 1.0 mg/dL   EKG 12 Lead   Result Value Ref Range    Ventricular Rate 66 BPM    Atrial Rate 66 BPM    P-R Interval 182 ms    QRS Duration 84 ms    Q-T Interval 400 ms QTc Calculation (Raquel) 419 ms    P Axis 42 degrees    R Axis 19 degrees    T Axis 34 degrees       RADIOLOGY:  XR KNEE LEFT (3 VIEWS)   Final Result   Significant degenerative changes at the knee joint. There appears to be a   chronic fracture of the medial tibial plateau. It was suggested on the study   05/10/2021 but appears to be somewhat more displaced         XR TIBIA FIBULA LEFT (2 VIEWS)   Final Result   Significant degenerative changes at the knee joint. There appears to be a   chronic fracture of the medial tibial plateau. It was suggested on the study   05/10/2021 but appears to be somewhat more displaced         CT KNEE LEFT WO CONTRAST    (Results Pending)   XR CHEST PORTABLE    (Results Pending)           ------------------------- NURSING NOTES AND VITALS REVIEWED ---------------------------  Date / Time Roomed:  10/14/2022  9:38 AM  ED Bed Assignment:  15/15    The nursing notes within the ED encounter and vital signs as below have been reviewed. Patient Vitals for the past 24 hrs:   BP Temp Temp src Pulse Resp SpO2   10/14/22 1000 (!) 185/74 -- -- 75 -- --   10/14/22 0945 (!) 199/89 97.8 °F (36.6 °C) Oral 77 20 100 %       Oxygen Saturation Interpretation: Normal    ------------------------------------------ PROGRESS NOTES ------------------------------------------    Counseling:  I have spoken with the patient and discussed todays results, in addition to providing specific details for the plan of care and counseling regarding the diagnosis and prognosis. Their questions are answered at this time and they are agreeable with the plan of admission.    --------------------------------- ADDITIONAL PROVIDER NOTES ---------------------------------  Consultations:  Time: 1311. Spoke with Dr. Rupert Covington. Discussed case. They will admit the patient.   This patient's ED course included: a personal history and physicial examination, re-evaluation prior to disposition, and multiple bedside re-evaluations    This patient has remained hemodynamically stable during their ED course. Diagnosis:  1. Unable to ambulate    2. Closed fracture of left tibial plateau, initial encounter        Disposition:  Patient's disposition: Admit to med/surg floor  Patient's condition is stable. Jaron Hansen MD  Resident  10/14/22 2378    I reviewed with the resident/PA/NP the medical history and the  findings on the physical examination. I discussed the patient's diagnosis and concur with the plan. HPI:   75 y/o F c/o knee pain  Physical exam:   HEENT: head is normocephalic atraumatic  Respiratory exam: No respiratory distress  Emergency department course: Patient was seen and examined.         Ravindra Cain MD  10/19/22 5013

## 2022-10-14 NOTE — LETTER
[address] [place] [address] Dr Department Medicaid  CERTIFICATION OF NECESSITY  FOR NON-EMERGENCY TRANSPORTATION   BY [facility]      Individual Information   1. Name: Margaret Payton 2. [address] [place] [address] Dr Medicaid Billing Number: ***   3. Address: [address] [address]      Transportation Provider Information   4. Provider Name: [facility]   5. [address] [place] [address] Dr Medicaid Provider Number:  National Provider Identifier (NPI):      Certification  7. Criteria:  During transport, this individual requires:  [x] Medical treatment or continuous     supervision by an EMT. [] The administration or regulation of oxygen by another person. [] Supervised protective restraint. 8. Period Beginning Date: 10/18/2022     9. Length  [x] Not more than 1 day(s)  [] One Year     Additional Information Relevant to Certification   10. Comments or Explanations, If Necessary or Appropriate - DX- Tibial plateau fracture, left closed, knee immobilizer, non wt bearing, difficulty maintaining wt bearing status, mod assist of 2 and supervision needed with transfers & for safety in route. Certifying Practitioner Information   11. Name of Practitioner: Dr. Ramsey Rocha   12. [address] [place] [address] Dr Medicaid Provider Number, If Applicable: NA 13. National Provider Identifier (NPI):      Signature Information   14. Date of Signature: 10/18/2022   15. Name of Person Signing: Doris Godwin     11.  Signature and Professional Designation: Electronically signed by PATY Godwin on 10/18/2022 at 12:01 PM       OD 49[address]  Rev. 7/2015

## 2022-10-15 ENCOUNTER — APPOINTMENT (OUTPATIENT)
Dept: GENERAL RADIOLOGY | Age: 72
DRG: 543 | End: 2022-10-15
Payer: MEDICARE

## 2022-10-15 PROCEDURE — 6360000002 HC RX W HCPCS: Performed by: INTERNAL MEDICINE

## 2022-10-15 PROCEDURE — 99222 1ST HOSP IP/OBS MODERATE 55: CPT | Performed by: INTERNAL MEDICINE

## 2022-10-15 PROCEDURE — 1200000000 HC SEMI PRIVATE

## 2022-10-15 PROCEDURE — 71045 X-RAY EXAM CHEST 1 VIEW: CPT

## 2022-10-15 PROCEDURE — 6370000000 HC RX 637 (ALT 250 FOR IP): Performed by: INTERNAL MEDICINE

## 2022-10-15 RX ORDER — ACETAMINOPHEN 325 MG/1
650 TABLET ORAL EVERY 6 HOURS PRN
Status: DISCONTINUED | OUTPATIENT
Start: 2022-10-15 | End: 2022-10-18 | Stop reason: HOSPADM

## 2022-10-15 RX ADMIN — ENOXAPARIN SODIUM 30 MG: 100 INJECTION SUBCUTANEOUS at 21:43

## 2022-10-15 RX ADMIN — LISINOPRIL 20 MG: 20 TABLET ORAL at 08:43

## 2022-10-15 RX ADMIN — LISINOPRIL 20 MG: 20 TABLET ORAL at 21:44

## 2022-10-15 ASSESSMENT — PAIN SCALES - GENERAL: PAINLEVEL_OUTOF10: 0

## 2022-10-15 NOTE — H&P
History and Physical      CHIEF COMPLAINT:    Left knee pain    History of Present Illness:  76-year-old female presenting emergency department for left leg and knee pain, felt a pop in her left knee the day prior and felt like she was unable to walk. Symptoms moderate to severe in severity, persistent, worse with ambulation, improved by Motrin, sudden onset. X-rays of the left knee revealed chronic tibial plateau fracture and degenerative joint disease. CT scan of the knee was done which showed medial and lateral tibial plateau fractures. Patient was admitted and orthopedic consultation obtained. Past Medical History:   Diagnosis Date    Heart murmur     Hypertension     Lymphedema     Metabolic syndrome     Osteoarthritis     Osteopenia     Pinched nerve          Past Surgical History:   Procedure Laterality Date    ABDOMEN SURGERY  1984    VEIN SURGERY         Medications Prior to Admission:    Medications Prior to Admission: b complex vitamins capsule, Take 1 capsule by mouth Daily with supper  ibuprofen (ADVIL LIQUI-GELS MINIS) 200 MG CAPS, Take 1 capsule by mouth 3 times daily  vitamin B-12 (CYANOCOBALAMIN) 1000 MCG tablet, Take 1,000 mcg by mouth daily  calcium carbonate (CALTRATE 600) 1500 (600 Ca) MG TABS tablet, Take 600 mg by mouth daily  Omega-3 Fatty Acids (FISH OIL) 1000 MG capsule, Take 2,000 mg by mouth 2 times daily  Cholecalciferol (VITAMIN D) 50 MCG (2000 UT) CAPS capsule, Take 2,000 Units by mouth in the morning and at bedtime  Garlic 5741 MG CAPS, Take 3,000 mg by mouth in the morning and at bedtime  cod liver oil CAPS, Take 2 capsules by mouth in the morning and at bedtime  [DISCONTINUED] Garlic 10 MG CAPS, Take by mouth  lisinopril (PRINIVIL;ZESTRIL) 20 MG tablet, Take 20 mg by mouth 2 times daily  Multiple Vitamins-Minerals (CENTRUM PO), Take 1 tablet by mouth daily    Allergies:    Patient has no known allergies. Social History:    reports that she has quit smoking.  She has never used smokeless tobacco. She reports current alcohol use. She reports that she does not use drugs. Family History:   family history includes Alzheimer's Disease in her father; Anemia in her maternal grandmother; Diabetes in her paternal grandmother; Heart Failure in her mother. REVIEW OF SYSTEMS:  As above in the HPI, otherwise negative    PHYSICAL EXAM:    Vitals:  /78   Pulse 88   Temp 97.8 °F (36.6 °C) (Oral)   Resp 19   Ht 5' 1\" (1.549 m)   Wt 248 lb (112.5 kg)   SpO2 96%   BMI 46.86 kg/m²     General:  Awake, alert, oriented X 3. Well developed, well nourished, well groomed. No apparent distress. HEENT:  Normocephalic, atraumatic. Pupils equal, round, reactive to light. No scleral icterus. No conjunctival injection. Normal lips, teeth, and gums. No nasal discharge. Neck:  Supple  Heart:  RRR, no murmurs, gallops, rubs  Lungs:  CTA bilaterally, bilat symmetrical expansion, no wheeze, rales, or rhonchi  Abdomen: Bowel sounds present, soft, nontender, no masses, no organomegaly, no peritoneal signs  Extremities:  No clubbing, cyanosis, left lower extremity extremity in Ace bandage and splint  Bilateral lymphedema which is chronic.   Skin:  Warm and dry, no open lesions or rash  Neuro:  Cranial nerves 2-12 intact, no focal deficits  Breast: deferred  Rectal: deferred  Genitalia:  deferred    LABS:  CBC:   Lab Results   Component Value Date/Time    WBC 7.2 10/14/2022 12:36 PM    RBC 4.51 10/14/2022 12:36 PM    HGB 14.1 10/14/2022 12:36 PM    HCT 41.4 10/14/2022 12:36 PM    MCV 91.8 10/14/2022 12:36 PM    MCH 31.3 10/14/2022 12:36 PM    MCHC 34.1 10/14/2022 12:36 PM    RDW 13.3 10/14/2022 12:36 PM     10/14/2022 12:36 PM    MPV 8.8 10/14/2022 12:36 PM     CMP:    Lab Results   Component Value Date/Time     10/14/2022 12:36 PM    K 4.0 10/14/2022 12:36 PM    CL 98 10/14/2022 12:36 PM    CO2 22 10/14/2022 12:36 PM    BUN 18 10/14/2022 12:36 PM    CREATININE 0.6 10/14/2022

## 2022-10-15 NOTE — CARE COORDINATION
SW consult noted for \"discharge planning\". Spoke with patient and she states she lives home alone in a 1 story ranch home with a basement. Her laundry is in the basement. She states she uses a cane at home, has a walker if needed. She has a bedside commode and a walk in shower with a shower chair if needed. She states she saw the orthopedic resident, plan is no surgery at this time. Her PCP is Dr Nadia Christy, she has no history of [facility] or inpatient rehab. Patient states she does therapy with [facility] and he does come to her house for sessions. We talked about transition of care at length. She states she does not want to go to rehab. She wants to go home. She states she wants to \"age in her home with help from healthcare workers\". SW explained that there is no 24 hour care that is covered by insurance, so she needs to be able to care for herself with some support from other agencies only. She states she has a cleaning lady once a month but she will not take on laundry, also pays a lawn service. Has [facility] coming for therapy. She is only wanting resources for laundry because she doesn't think she can get to basement. Gave her information on private duty companies but explained they may not do laundry only and there are certain numbers of hours they require. She states understanding. She may benefit from therapy evals here if able to participate per ortho.

## 2022-10-15 NOTE — CONSULTS
Department of Orthopedic Surgery  Consult Note    Reason for Consult: Left knee pain    HISTORY OF PRESENT ILLNESS:       Patient is a 74 y.o. female who presents with pain in her left knee. Patient has been followed by Dr. Dalila Blake for tricompartmental severe osteoarthritis of her knees bilaterally and recently received a cortisone shot 2 months ago. She reports appropriate level of pain with conservative management. Patient reports that last Sunday she started feeling her knees becoming more unstable. Yesterday she heard a pop after attempting to stand up from a sitting chair position. She reports immediate pain with weightbearing, forcing her to remain in supine position. Patient states that the pain has become unbearable today triggering her to call the ambulance which brought her to [facility]. She is currently in bed pleasant and denies any ongoing pain. She also denies any trauma. Her pain is only present with ambulation and weightbearing. Her pain is mainly located in the outside of her knee. Patient denies shortness of breath, chest pain. Patient has no other acute complaint. Past Medical History:        Diagnosis Date    Heart murmur     Hypertension     Lymphedema     Metabolic syndrome     Osteoarthritis     Osteopenia     Pinched nerve      Past Surgical History:        Procedure Laterality Date    ABDOMEN SURGERY  1984    VEIN SURGERY       Current Medications:   Current Facility-Administered Medications: lisinopril (PRINIVIL;ZESTRIL) tablet 20 mg, 20 mg, Oral, BID  enoxaparin Sodium (LOVENOX) injection 30 mg, 30 mg, SubCUTAneous, Daily  Allergies:  Patient has no known allergies. Social History:   TOBACCO:   reports that she has quit smoking. She has never used smokeless tobacco.  ETOH:   reports current alcohol use. DRUGS:   reports no history of drug use.   ACTIVITIES OF DAILY LIVING:    OCCUPATION:    Family History:       Problem Relation Age of Onset    Heart Failure Mother Alzheimer's Disease Father     Anemia Maternal Grandmother     Diabetes Paternal Grandmother        REVIEW OF SYSTEMS:  CONSTITUTIONAL:  negative for  fevers, chills  EYES:  negative for blurred vision, visual disturbance  HEENT:  negative for  hearing loss, voice change  RESPIRATORY:  negative for  dyspnea, wheezing  CARDIOVASCULAR:  negative for  chest pain, palpitations  GASTROINTESTINAL:  negative for nausea, vomiting  GENITOURINARY:  negative for frequency, urinary incontinence  HEMATOLOGIC/LYMPHATIC:  negative for bleeding and petechiae  MUSCULOSKELETAL:  positive for  pain, joint swelling, and stiff joints  NEUROLOGICAL:  negative for headaches, dizziness  BEHAVIOR/PSYCH:  negative for increased agitation and anxiety    PHYSICAL EXAM:    VITALS:  BP (!) 160/68 Comment: nurse notified  Pulse 72   Temp 97.5 °F (36.4 °C) (Oral)   Resp 18   Ht 5' 1\" (1.549 m)   Wt 248 lb (112.5 kg)   SpO2 100%   BMI 46.86 kg/m²   CONSTITUTIONAL:  awake, alert, cooperative, no apparent distress, and appears stated age  MUSCULOSKELETAL:  Left lower Extremity:  Pain with deep palpation over lateral and medial knee joint  No signs of trauma  Significant edema noted   Patient able to flex/extend toes, ankle/knee and hip with active and passive range of motion with minimal pain  2+ DP/PT pulses, capillary refill less than 3 seconds  No sensation over medial midfoot  Pain with valgus/varus stress of the knee      Secondary Exam:   bilateralUE: No obvious signs of trauma. -TTP to fingers, hand, wrist, forearm, elbow, humerus, shoulder or clavicle. -- Patient able to flex/extend fingers, wrist, elbow and shoulder with active and passive ROM without pain, +2/4 Radial pulse, cap refill <3sec, +AIN/PIN/Radial/Ulnar/Median N, distal sensation grossly intact to C4-T1 dermatomes, compartments soft and compressible. rightLE: No obvious signs of trauma. Significant swelling noted. -TTP to foot, ankle, leg, knee, thigh, hip.-- Patient able to flex/extend toes, ankle, knee and hip with active and passive ROM without pain,+2/4 DP & PT pulses, cap refill <3sec, +5/5 PF/DF/EHL, distal sensation grossly intact to L4-S1 dermatomes, compartments soft and compressible. Pelvis: -TTP, -Log roll, -Heel strike     DATA:    CBC:   Lab Results   Component Value Date/Time    WBC 7.2 10/14/2022 12:36 PM    RBC 4.51 10/14/2022 12:36 PM    HGB 14.1 10/14/2022 12:36 PM    HCT 41.4 10/14/2022 12:36 PM    MCV 91.8 10/14/2022 12:36 PM    MCH 31.3 10/14/2022 12:36 PM    MCHC 34.1 10/14/2022 12:36 PM    RDW 13.3 10/14/2022 12:36 PM     10/14/2022 12:36 PM    MPV 8.8 10/14/2022 12:36 PM     PT/INR:  No results found for: PROTIME, INR    Radiology Review:    X-ray left knee demonstrate severe left knee joint degenerative disease with diffuse osteophyte formation, with significant tibiofemoral joint space narrowing. Appears to be a medial tibial plateau fracture, but not obvious. Poor bone quality noted throughout. Varus deformity noted    X-ray right tibia fibula demonstrate no acute fracture or dislocation. Poor bone quality noted throughout. Severe osteoarthritis noted up in the knee joint    CT right knee demonstrate severe degenerative joint disease with joint space narrowing and osteophyte splitting by the medial tibial plateau.       IMPRESSION:  Acute on chronic left knee osteoarthritis exacerbation    PLAN:  Nonweightbearing left lower extremity  Patient was placed in a knee immobilizer  Pain medication per primary team  Anticoagulants DVT prophylaxis  No acute orthopedic intervention at this time  Follow-up in clinic with Dr. Martha York with attending    Monalisa Hein DO

## 2022-10-16 PROCEDURE — 1200000000 HC SEMI PRIVATE

## 2022-10-16 PROCEDURE — 6360000002 HC RX W HCPCS: Performed by: INTERNAL MEDICINE

## 2022-10-16 PROCEDURE — 99231 SBSQ HOSP IP/OBS SF/LOW 25: CPT | Performed by: INTERNAL MEDICINE

## 2022-10-16 PROCEDURE — 6370000000 HC RX 637 (ALT 250 FOR IP): Performed by: INTERNAL MEDICINE

## 2022-10-16 PROCEDURE — 97110 THERAPEUTIC EXERCISES: CPT | Performed by: PHYSICAL THERAPIST

## 2022-10-16 PROCEDURE — 97161 PT EVAL LOW COMPLEX 20 MIN: CPT | Performed by: PHYSICAL THERAPIST

## 2022-10-16 PROCEDURE — 97530 THERAPEUTIC ACTIVITIES: CPT | Performed by: PHYSICAL THERAPIST

## 2022-10-16 RX ORDER — DIPHENHYDRAMINE HYDROCHLORIDE, ZINC ACETATE 2; .1 G/100G; G/100G
CREAM TOPICAL 3 TIMES DAILY PRN
Status: DISCONTINUED | OUTPATIENT
Start: 2022-10-16 | End: 2022-10-18 | Stop reason: HOSPADM

## 2022-10-16 RX ORDER — IBUPROFEN 400 MG/1
400 TABLET ORAL EVERY 8 HOURS PRN
Status: DISCONTINUED | OUTPATIENT
Start: 2022-10-16 | End: 2022-10-18 | Stop reason: HOSPADM

## 2022-10-16 RX ADMIN — LISINOPRIL 20 MG: 20 TABLET ORAL at 08:47

## 2022-10-16 RX ADMIN — ENOXAPARIN SODIUM 30 MG: 100 INJECTION SUBCUTANEOUS at 20:04

## 2022-10-16 RX ADMIN — LISINOPRIL 20 MG: 20 TABLET ORAL at 20:04

## 2022-10-16 ASSESSMENT — PAIN SCALES - GENERAL: PAINLEVEL_OUTOF10: 0

## 2022-10-16 NOTE — PLAN OF CARE
Problem: Discharge Planning  Goal: Discharge to home or other facility with appropriate resources  10/16/2022 0211 by BULL Carlos  Outcome: Progressing  Flowsheets (Taken 10/14/2022 1530 by Marietta Olmedo RN)  Discharge to home or other facility with appropriate resources: Identify discharge learning needs (meds, wound care, etc)  10/15/2022 1327 by Krish Neves RN  Outcome: Progressing     Problem: Safety - Adult  Goal: Free from fall injury  10/16/2022 0211 by BULL Carlos  Outcome: Progressing  [address] (Taken 10/15/2022 2000)  Free From Fall Injury: Instruct family/caregiver on patient safety  10/15/2022 1327 by Krish Neves RN  Outcome: Progressing     Problem: ABCDS Injury Assessment  Goal: Absence of physical injury  10/16/2022 0211 by BULL Carlos  Outcome: Progressing  Flowsheets (Taken 10/15/2022 2000)  Absence of Physical Injury: Implement safety measures based on patient assessment  10/15/2022 1327 by Krish Neves RN  Outcome: Progressing     Problem: Skin/Tissue Integrity  Goal: Absence of new skin breakdown  Description: 1. Monitor for areas of redness and/or skin breakdown  2. Assess vascular access sites hourly  3. Every 4-6 hours minimum:  Change oxygen saturation probe site  4. Every 4-6 hours:  If on nasal continuous positive airway pressure, respiratory therapy assess nares and determine need for appliance change or resting period.   10/16/2022 0211 by BULL Carlos  Outcome: Progressing  10/15/2022 1327 by Krish Neves RN  Outcome: Progressing     Problem: Pain  Goal: Verbalizes/displays adequate comfort level or baseline comfort level  10/16/2022 0211 by BULL Carlos  Outcome: Progressing  Flowsheets (Taken 10/15/2022 2000)  Verbalizes/displays adequate comfort level or baseline comfort level:   Encourage patient to monitor pain and request assistance   Assess pain using appropriate pain scale   Administer analgesics based on type and severity of pain and evaluate response   Implement non-pharmacological measures as appropriate and evaluate response  10/15/2022 1327 by Nancy Amaro RN  Outcome: Progressing

## 2022-10-16 NOTE — PLAN OF CARE
Problem: Discharge Planning  Goal: Discharge to home or other facility with appropriate resources  10/16/2022 1210 by Ana Mckeon RN  Outcome: Progressing  10/16/2022 0211 by Lizbeth Gómez RN  Outcome: Progressing  Flowsheets (Taken 10/14/2022 1530 by Bandar Tam RN)  Discharge to home or other facility with appropriate resources: Identify discharge learning needs (meds, wound care, etc)     Problem: Safety - Adult  Goal: Free from fall injury  10/16/2022 1210 by Ana Mckeon RN  Outcome: Progressing  10/16/2022 0211 by Lizbeth Gómez RN  Outcome: Progressing  Flowsheets (Taken 10/15/2022 2000)  Free From Fall Injury: Instruct family/caregiver on patient safety     Problem: ABCDS Injury Assessment  Goal: Absence of physical injury  10/16/2022 1210 by Ana Mckeon RN  Outcome: Progressing  10/16/2022 0211 by Lizbeth Gómez RN  Outcome: Progressing  Flowsheets (Taken 10/15/2022 2000)  Absence of Physical Injury: Implement safety measures based on patient assessment     Problem: Skin/Tissue Integrity  Goal: Absence of new skin breakdown  Description: 1. Monitor for areas of redness and/or skin breakdown  2. Assess vascular access sites hourly  3. Every 4-6 hours minimum:  Change oxygen saturation probe site  4. Every 4-6 hours:  If on nasal continuous positive airway pressure, respiratory therapy assess nares and determine need for appliance change or resting period.   10/16/2022 1210 by Ana Mckeon RN  Outcome: Progressing  10/16/2022 0211 by Lizbeth Gómez RN  Outcome: Progressing     Problem: Pain  Goal: Verbalizes/displays adequate comfort level or baseline comfort level  10/16/2022 1210 by Ana Mckeon RN  Outcome: Progressing  10/16/2022 0211 by Lizbeth Gómez RN  Outcome: Progressing  Flowsheets (Taken 10/15/2022 2000)  Verbalizes/displays adequate comfort level or baseline comfort level:   Encourage patient to monitor pain and request assistance   Assess pain using

## 2022-10-16 NOTE — PROGRESS NOTES
Department of Internal Medicine  General Internal Medicine  Attending Progress Note      SUBJECTIVE:     Feels ok  Still with some pain in the left knee  Did not start physical therapy yet    Medications    Current Facility-Administered Medications: diphenhydrAMINE-zinc acetate cream, , Topical, TID PRN  ibuprofen (ADVIL;MOTRIN) tablet 400 mg, 400 mg, Oral, Q8H PRN  acetaminophen (TYLENOL) tablet 650 mg, 650 mg, Oral, Q6H PRN  lisinopril (PRINIVIL;ZESTRIL) tablet 20 mg, 20 mg, Oral, BID  enoxaparin Sodium (LOVENOX) injection 30 mg, 30 mg, SubCUTAneous, Daily    Physical    VITALS:  BP (!) 147/70   Pulse 72   Temp 98.1 °F (36.7 °C) (Oral)   Resp 17   Ht 5' 1\" (1.549 m)   Wt 248 lb (112.5 kg)   SpO2 95%   BMI 46.86 kg/m²   TEMPERATURE:  Current - Temp: 98.1 °F (36.7 °C); Max - Temp  Av.3 °F (36.8 °C)  Min: 98.1 °F (36.7 °C)  Max: 98.4 °F (36.9 °C)  RESPIRATIONS RANGE: Resp  Av.5  Min: 17  Max: 18  PULSE RANGE: Pulse  Av.7  Min: 72  Max: 89  BLOOD PRESSURE RANGE:  Systolic (60CUG), ZOM:257 , Min:143 , DDI:539   ; Diastolic (79HOQ), IPD:41, Min:70, Max:82    PULSE OXIMETRY RANGE: SpO2  Av.5 %  Min: 95 %  Max: 96 %  24HR INTAKE/OUTPUT:    Intake/Output Summary (Last 24 hours) at 10/16/2022 0924  Last data filed at 10/16/2022 0825  Gross per 24 hour   Intake 720 ml   Output 1200 ml   Net -480 ml       CONSTITUTIONAL: Awake, no distress, appears as stated age. HEENT: Normocephalic atraumatic. Pupils are equal and reactive bilaterally. Extraocular movements are intact. No pallor or icterus appreciated. NECK: Supple, no JVD , no lymphadenopathy, no bruits, no thyromegaly  LUNGS: Clear to auscultation bilaterally. CARDIOVASCULAR: Regular rate and rhythm, no murmur rub or gallop. ABDOMEN: Soft, nontender, bowel sounds are positive, no organomegaly appreciated. Obese  NEUROLOGIC: Awake, alert, oriented x 3 , no focal deficits noted. EXT: No clubbing, or cyanosis.   Lymphedema b/l lower extremities  Bandages and splint Left lower extremity. ASSESSMENT AND PLAN      1. Medial and lateral  left tibial plateau fractures. Patient admitted and orthopedic consultation was obtained. She is nonweightbearing on that leg. She is on Lovenox for DVT prophylaxis. PT /OT consultation and  consultation. Awaiting Orthopedic input from Dr Mckeon/Dr Justin Rome regarding surgical intervention  Tylenol 650 mg q 6 hrs as needed and motrin 400 mg tid prn for pain  2. Hypertension continue lisinopril 20 mg twice daily. 3.  Chronic lymphedema of lower extremities. 4.  Generalized osteoarthritis. 5.  Osteopenia continue vitamin D supplementation.     Esteban Wu MD, MD.  9:24 AM  10/16/2022

## 2022-10-16 NOTE — PROGRESS NOTES
Physical Therapy    Physical Therapy Initial Evaluation/Plan of Care    Room #:  8448/9904-75  Patient Name: Mary Anne Banerjee  YOB: 1950  MRN: [id number]    Date of Service: 10/16/2022     Tentative placement recommendation: Subacute rehab  Equipment recommendation: To be determined      Evaluating Physical Therapist: Dottie Gaytan, [address] [facility]  #[id number]      Specific Provider Orders/Date/Referring Provider :  10/15/22 0845    PT eval and treat  Start:  10/15/22 0845,   End:  10/15/22 0845,   ONE TIME,   Standing Count:  1 Occurrences,   Grace Manuel MD     Admitting Diagnosis:   Unable to ambulate [R26.2]  Tibial plateau fracture, left, closed, initial encounter [S82.142A]  Closed fracture of left tibial plateau, initial encounter [S82.142A]    Admitted with    left leg and knee pain, felt a pop in her left knee   Progressed tibial plateau fracture  , non surgical per ortho    Surgery: none  Visit Diagnoses         Codes    Unable to ambulate    -  Primary R26.2    Closed fracture of left tibial plateau, initial encounter     S82.142A            Patient Active Problem List   Diagnosis    DJD (degenerative joint disease) of knee    Primary osteoarthritis of both knees    DDD (degenerative disc disease), lumbar    Lumbosacral radiculopathy at L4    Lumbar spinal stenosis    Tibial plateau fracture, left, closed, initial encounter        ASSESSMENT of Current Deficits Patient exhibits decreased strength, balance, endurance, and coordination impairing functional mobility, transfers, tolerance to activity, and bed mobility  are barriers to d/c and require skilled intervention during hospital stay to attain wheelchair  level of function including trfs and mobility.  Decreased strength, balance and endurance  increases patient's risk for fall along with new restrictive wt bearing and knee immobilizer        PHYSICAL THERAPY  PLAN OF CARE       Physical therapy plan of care is established based on physician order,  patient diagnosis and clinical assessment    Current Treatment Recommendations:    -Bed Mobility: Lower extremity exercises , Upper extremity exercises , and Trunk control activities   -Sitting Balance: Incorporate reaching activities to activate trunk muscles , Facilitate postural control in all planes , and seated tricep pushup to facilitate wheelchair mobility and slide board / stand pivot trf  -Standing Balance: Perform strengthening exercises in standing to promote motor control with or without upper extremity support  and Perform sit to stand activities maintaining NWB (non-weight bearing) weightbearing left lower extremity   -Transfers: Cues for hand placement, technique and safety. Provide stabilization to prevent fall  and Provide instruction on proper hand and right lower extremity placement to maintain NWB (non-weight bearing) weightbearing left lower extremity   -Endurance: Utilize Supervised activities to increase level of endurance to allow for safe functional mobility including transfers and gait   Instruct slide board trf in unable to maintain   non weight bearing Left lower extremity in standing for pivot    PT long term treatment goals are located in below grid    Patient and or family understand(s) diagnosis, prognosis, and plan of care. Frequency of treatments: Patient will be seen  daily.          Prior Level of Function: Patient ambulated independently    Rehab Potential: good   for baseline    Past medical history:   Past Medical History:   Diagnosis Date    Heart murmur     Hypertension     Lymphedema     Metabolic syndrome     Osteoarthritis     Osteopenia     Pinched nerve      Past Surgical History:   Procedure Laterality Date    ABDOMEN SURGERY  1984    VEIN SURGERY         SUBJECTIVE:    Precautions: , falls, alarm, and knee immobilzer    , non weight bearing Left lower extremity     Social history: Patient lives alone in a ranch home  with 5 steps  to enter with [employer] owned: [place],       [address] [facility]  Mobility Inpatient   How much difficulty turning over in bed?: A Little  How much difficulty sitting down on / standing up from a chair with arms?: Unable  How much difficulty moving from lying on back to sitting on side of bed?: A Lot  How much help from another person moving to and from a bed to a chair?: Total  How much help from another person needed to walk in hospital room?: Total  How much help from another person for climbing 3-5 steps with a railing?: Total  AM-PAC Inpatient Mobility Raw Score : 9  AM-[facility] Inpatient T-Scale Score : 30.55  Mobility Inpatient CMS 0-100% Score: 81.38  Mobility Inpatient CMS G-Code Modifier : CM    Nursing cleared patient for PT evaluation. The admitting diagnosis and active problem list as listed above have been reviewed prior to the initiation of this evaluation. OBJECTIVE;   Initial Evaluation  Date: 10/16/2022 Treatment Date:     Short Term/ Long Term   Goals   Was pt agreeable to Eval/treatment? Yes  To be met in 2 days   Pain level   0/10        Bed Mobility    Rolling: Minimal assist of 1    Supine to sit: Moderate assist of 1  for Left lower extremity and trunk  Sit to supine: Moderate assist of 1  for Left lower extremity   Scooting: Moderate assist of 1  seated laterally 3 sets of 5 scoots  Rolling: Independent    Supine to sit:  Independent    Sit to supine: Independent    Scooting: Independent     Transfers Sit to stand: Not assessed    discussed options of stand pivot vs slide board  Sit to stand: Supervision     Slide board : supervision    ROM Within functional limits  with exception of left knee  due to knee immobilizer and left ankle neutral dorsiflexion  Increase range of motion 10% of affected joints    Strength BUE:   4/5  RLE:  4-/5  LLE:  3-/5  Increase strength in affected mm groups by 1/3 grade   Balance Sitting EOB:  fair    Dynamic Standing:  not assessed    Sitting EOB:  good Dynamic Standing: fair with  walker      Patient is Alert & Oriented x person, place, time, and situation and follows directions    Sensation:  Patient  denies numbness/tingling however reports issues with sciatica intermittently  Edema:  yes bilateral lower extremities   Endurance: fair          Patient education  Patient educated on role of Physical Therapy, risks of immobility, safety and plan of care,  importance of mobility while in hospital , ankle pumps, quad set and glut set for edema control, blood clot prevention, safety , and weight bearing status      Patient response to education:   Pt verbalized understanding Pt demonstrated skill Pt requires further education in this area   Yes Partial Yes      Treatment:  Patient practiced and was instructed/facilitated in the following treatment: Patient   Sat edge of bed 20 minutes with Supervision  to increase dynamic sitting balance and activity tolerance. Seated challenges with wt shifts, scooting laterally     Therapeutic Exercises:  ankle pumps, quad sets, glut sets, hip abduction/adduction, and straight leg raise  x 10 reps. At end of session, patient in bed with nursing present call light and phone within reach,  all lines and tubes intact, nursing notified. Patient would benefit from continued skilled Physical Therapy to improve functional independence and quality of life. Patient's/ family goals   home    Time in  5  Time out  543    Total Treatment Time  24 minutes    Evaluation time includes thorough review of current medical information, gathering information on past medical history/social history and prior level of function, completion of standardized testing/informal observation of tasks, assessment of data, and development of Plan of care and goals.      CPT codes:  Low Complexity PT evaluation (98186)  Therapeutic activities (79741)   8 minutes  1 unit(s)  Therapeutic exercises (06738)   16 minutes  1 unit(s)    Orestes Haskins Vaughn Jean

## 2022-10-17 LAB
ALBUMIN SERPL-MCNC: 3.5 G/DL (ref 3.5–5.2)
ALP BLD-CCNC: 66 U/L (ref 35–104)
ALT SERPL-CCNC: 13 U/L (ref 0–32)
ANION GAP SERPL CALCULATED.3IONS-SCNC: 12 MMOL/L (ref 7–16)
AST SERPL-CCNC: 18 U/L (ref 0–31)
BASOPHILS ABSOLUTE: 0.04 E9/L (ref 0–0.2)
BASOPHILS RELATIVE PERCENT: 0.6 % (ref 0–2)
BILIRUB SERPL-MCNC: 0.4 MG/DL (ref 0–1.2)
BUN BLDV-MCNC: 13 MG/DL (ref 6–23)
CALCIUM SERPL-MCNC: 8.9 MG/DL (ref 8.6–10.2)
CHLORIDE BLD-SCNC: 99 MMOL/L (ref 98–107)
CO2: 20 MMOL/L (ref 22–29)
CREAT SERPL-MCNC: 0.6 MG/DL (ref 0.5–1)
EOSINOPHILS ABSOLUTE: 0.27 E9/L (ref 0.05–0.5)
EOSINOPHILS RELATIVE PERCENT: 4.3 % (ref 0–6)
GFR AFRICAN AMERICAN: >60
GFR NON-AFRICAN AMERICAN: >60 ML/MIN/1.73
GLUCOSE BLD-MCNC: 115 MG/DL (ref 74–99)
HCT VFR BLD CALC: 40 % (ref 34–48)
HEMOGLOBIN: 13.1 G/DL (ref 11.5–15.5)
IMMATURE GRANULOCYTES #: 0.02 E9/L
IMMATURE GRANULOCYTES %: 0.3 % (ref 0–5)
LYMPHOCYTES ABSOLUTE: 2.3 E9/L (ref 1.5–4)
LYMPHOCYTES RELATIVE PERCENT: 36.2 % (ref 20–42)
MCH RBC QN AUTO: 30.8 PG (ref 26–35)
MCHC RBC AUTO-ENTMCNC: 32.8 % (ref 32–34.5)
MCV RBC AUTO: 93.9 FL (ref 80–99.9)
MONOCYTES ABSOLUTE: 0.63 E9/L (ref 0.1–0.95)
MONOCYTES RELATIVE PERCENT: 9.9 % (ref 2–12)
NEUTROPHILS ABSOLUTE: 3.09 E9/L (ref 1.8–7.3)
NEUTROPHILS RELATIVE PERCENT: 48.7 % (ref 43–80)
PDW BLD-RTO: 13.2 FL (ref 11.5–15)
PLATELET # BLD: 228 E9/L (ref 130–450)
PMV BLD AUTO: 9 FL (ref 7–12)
POTASSIUM SERPL-SCNC: 4 MMOL/L (ref 3.5–5)
RBC # BLD: 4.26 E12/L (ref 3.5–5.5)
SODIUM BLD-SCNC: 131 MMOL/L (ref 132–146)
TOTAL PROTEIN: 6.5 G/DL (ref 6.4–8.3)
WBC # BLD: 6.4 E9/L (ref 4.5–11.5)

## 2022-10-17 PROCEDURE — 97110 THERAPEUTIC EXERCISES: CPT

## 2022-10-17 PROCEDURE — 99231 SBSQ HOSP IP/OBS SF/LOW 25: CPT | Performed by: INTERNAL MEDICINE

## 2022-10-17 PROCEDURE — 6360000002 HC RX W HCPCS: Performed by: INTERNAL MEDICINE

## 2022-10-17 PROCEDURE — 36415 COLL VENOUS BLD VENIPUNCTURE: CPT

## 2022-10-17 PROCEDURE — 97530 THERAPEUTIC ACTIVITIES: CPT

## 2022-10-17 PROCEDURE — 80053 COMPREHEN METABOLIC PANEL: CPT

## 2022-10-17 PROCEDURE — 6370000000 HC RX 637 (ALT 250 FOR IP): Performed by: INTERNAL MEDICINE

## 2022-10-17 PROCEDURE — 85025 COMPLETE CBC W/AUTO DIFF WBC: CPT

## 2022-10-17 PROCEDURE — 1200000000 HC SEMI PRIVATE

## 2022-10-17 RX ORDER — ENOXAPARIN SODIUM 100 MG/ML
30 INJECTION SUBCUTANEOUS 2 TIMES DAILY
Status: DISCONTINUED | OUTPATIENT
Start: 2022-10-17 | End: 2022-10-18 | Stop reason: HOSPADM

## 2022-10-17 RX ADMIN — ENOXAPARIN SODIUM 30 MG: 100 INJECTION SUBCUTANEOUS at 20:31

## 2022-10-17 RX ADMIN — LISINOPRIL 20 MG: 20 TABLET ORAL at 09:17

## 2022-10-17 RX ADMIN — LISINOPRIL 20 MG: 20 TABLET ORAL at 20:32

## 2022-10-17 RX ADMIN — ENOXAPARIN SODIUM 30 MG: 100 INJECTION SUBCUTANEOUS at 14:28

## 2022-10-17 RX ADMIN — IBUPROFEN 400 MG: 400 TABLET, FILM COATED ORAL at 12:06

## 2022-10-17 ASSESSMENT — PAIN DESCRIPTION - DESCRIPTORS: DESCRIPTORS: ACHING

## 2022-10-17 ASSESSMENT — PAIN DESCRIPTION - ORIENTATION: ORIENTATION: LEFT

## 2022-10-17 ASSESSMENT — PAIN SCALES - GENERAL
PAINLEVEL_OUTOF10: 1
PAINLEVEL_OUTOF10: 0
PAINLEVEL_OUTOF10: 0

## 2022-10-17 ASSESSMENT — PAIN DESCRIPTION - LOCATION: LOCATION: LEG

## 2022-10-17 NOTE — PLAN OF CARE
Problem: Discharge Planning  Goal: Discharge to home or other facility with appropriate resources  10/16/2022 2124 by Zulma Gupta RN  Outcome: Progressing  10/16/2022 1210 by Bradley Campos RN  Outcome: Progressing     Problem: Safety - Adult  Goal: Free from fall injury  10/16/2022 2124 by Zulma Gupta RN  Outcome: Progressing  10/16/2022 1210 by Bradley Campos RN  Outcome: Progressing     Problem: ABCDS Injury Assessment  Goal: Absence of physical injury  10/16/2022 2124 by Zulma Gupta RN  Outcome: Progressing  10/16/2022 1210 by Bradley Campos RN  Outcome: Progressing     Problem: Skin/Tissue Integrity  Goal: Absence of new skin breakdown  Description: 1. Monitor for areas of redness and/or skin breakdown  2. Assess vascular access sites hourly  3. Every 4-6 hours minimum:  Change oxygen saturation probe site  4. Every 4-6 hours:  If on nasal continuous positive airway pressure, respiratory therapy assess nares and determine need for appliance change or resting period.   10/16/2022 2124 by Zulma Gupta RN  Outcome: Progressing  10/16/2022 1210 by Bradley Campos RN  Outcome: Progressing     Problem: Pain  Goal: Verbalizes/displays adequate comfort level or baseline comfort level  10/16/2022 2124 by Zulma Gupta RN  Outcome: Progressing  10/16/2022 1210 by Bradley Campos RN  Outcome: Progressing

## 2022-10-17 NOTE — PROGRESS NOTES
Physical Therapy    Physical Therapy Treatment Note/Plan of Care    Room #:  1570/8324-53  Patient Name: Shankar Calles  YOB: 1950  MRN: [id number]    Date of Service: 10/17/2022     Tentative placement recommendation: Subacute rehab  Equipment recommendation: To be determined      Evaluating Physical Therapist: Alise Christiansen  #[id number]      Specific Provider Orders/Date/Referring Provider :  10/15/22 0845    PT eval and treat  Start:  10/15/22 0845,   End:  10/15/22 0845,   ONE TIME,   Standing Count:  1 Occurrences,   Michelle Moss MD     Admitting Diagnosis:   Unable to ambulate [R26.2]  Tibial plateau fracture, left, closed, initial encounter [S82.142A]  Closed fracture of left tibial plateau, initial encounter [S82.142A]    Admitted with    left leg and knee pain, felt a pop in her left knee   Progressed tibial plateau fracture  , non surgical per ortho    Surgery: none  Visit Diagnoses         Codes    Unable to ambulate    -  Primary R26.2    Closed fracture of left tibial plateau, initial encounter     S82.142A            Patient Active Problem List   Diagnosis    DJD (degenerative joint disease) of knee    Primary osteoarthritis of both knees    DDD (degenerative disc disease), lumbar    Lumbosacral radiculopathy at L4    Lumbar spinal stenosis    Tibial plateau fracture, left, closed, initial encounter        ASSESSMENT of Current Deficits Patient exhibits decreased strength, balance, endurance, and coordination impairing functional mobility, transfers, tolerance to activity, and bed mobility  are barriers to d/c and require skilled intervention during hospital stay to attain wheelchair  level of function including trfs and mobility. Decreased strength, balance and endurance  increases patient's risk for fall along with new restrictive wt bearing and knee immobilizer. Patient agreed on bed exercises due to lunch tray present.         PHYSICAL THERAPY  PLAN OF CARE Physical therapy plan of care is established based on physician order,  patient diagnosis and clinical assessment    Current Treatment Recommendations:    -Bed Mobility: Lower extremity exercises , Upper extremity exercises , and Trunk control activities   -Sitting Balance: Incorporate reaching activities to activate trunk muscles , Facilitate postural control in all planes , and seated tricep pushup to facilitate wheelchair mobility and slide board / stand pivot trf  -Standing Balance: Perform strengthening exercises in standing to promote motor control with or without upper extremity support  and Perform sit to stand activities maintaining NWB (non-weight bearing) weightbearing left lower extremity   -Transfers: Cues for hand placement, technique and safety. Provide stabilization to prevent fall  and Provide instruction on proper hand and right lower extremity placement to maintain NWB (non-weight bearing) weightbearing left lower extremity   -Endurance: Utilize Supervised activities to increase level of endurance to allow for safe functional mobility including transfers and gait   Instruct slide board trf in unable to maintain   non weight bearing Left lower extremity in standing for pivot    PT long term treatment goals are located in below grid    Patient and or family understand(s) diagnosis, prognosis, and plan of care. Frequency of treatments: Patient will be seen  daily.          Prior Level of Function: Patient ambulated independently    Rehab Potential: good   for baseline    Past medical history:   Past Medical History:   Diagnosis Date    Heart murmur     Hypertension     Lymphedema     Metabolic syndrome     Osteoarthritis     Osteopenia     Pinched nerve      Past Surgical History:   Procedure Laterality Date    ABDOMEN SURGERY  1984    VEIN SURGERY         SUBJECTIVE:    Precautions: , falls, alarm, and knee immobilzer    , non weight bearing Left lower extremity     Social history: Patient lives alone in a ranch home  with 5 steps  to enter with [employer] owned: Angely Barclay,       [address] [facility]   How much difficulty turning over in bed?: A Lot  How much difficulty sitting down on / standing up from a chair with arms?: Unable  How much difficulty moving from lying on back to sitting on side of bed?: A Lot  How much help from another person moving to and from a bed to a chair?: Total  How much help from another person needed to walk in hospital room?: Total  How much help from another person for climbing 3-5 steps with a railing?: Total  AM-PAC Inpatient Mobility Raw Score : 8  AM-PAC Inpatient T-Scale Score : 28.52  Mobility Inpatient CMS 0-100% Score: 86.62  Mobility Inpatient CMS G-Code Modifier : CM    Nursing cleared patient for PT treatment. OBJECTIVE;   Initial Evaluation  Date: 10/16/2022 Treatment Date:    10/17/2022   Short Term/ Long Term   Goals   Was pt agreeable to Eval/treatment? Yes Yes  To be met in 2 days   Pain level   0/10    0/10    Bed Mobility    Rolling: Minimal assist of 1    Supine to sit: Moderate assist of 1  for Left lower extremity and trunk  Sit to supine: Moderate assist of 1  for Left lower extremity   Scooting: Moderate assist of 1  seated laterally 3 sets of 5 scoots Rolling: Not assessed    Supine to sit: Not assessed    Sit to supine: Not assessed    Scooting: Not assessed     Rolling: Independent    Supine to sit:  Independent    Sit to supine: Independent    Scooting: Independent     Transfers Sit to stand: Not assessed    discussed options of stand pivot vs slide board Sit to stand: Not assessed     Sit to stand: Supervision     Slide board : supervision    ROM Within functional limits  with exception of left knee  due to knee immobilizer and left ankle neutral dorsiflexion  Increase range of motion 10% of affected joints    Strength BUE:   4/5  RLE:  4-/5  LLE:  3-/5  Increase strength in affected mm groups by 1/3 grade   Balance Sitting EOB:  fair    Dynamic Standing:  not assessed   Sitting EOB: not assessed   Dynamic Standing: not assessed    Sitting EOB:  good    Dynamic Standing: fair with  walker      Patient is Alert & Oriented x person, place, time, and situation and follows directions    Sensation:  Patient  denies numbness/tingling however reports issues with sciatica intermittently  Edema:  yes bilateral lower extremities   Endurance: fair          Patient education  Patient educated on role of Physical Therapy, risks of immobility, safety and plan of care,  importance of mobility while in hospital , ankle pumps, quad set and glut set for edema control, blood clot prevention, safety , and weight bearing status      Patient response to education:   Pt verbalized understanding Pt demonstrated skill Pt requires further education in this area   Yes Partial Yes      Treatment:  Patient practiced and was instructed/facilitated in the following treatment: Patient    performed supine exercise. Discussed sliding board transfer for tomorrows treatment session. Therapeutic Exercises:  ankle pumps, quad sets, glut sets, hip abduction/adduction, and straight leg raise  x 15-20 reps. At end of session, patient in bed with  lunch tray  call light and phone within reach,  all lines and tubes intact, nursing notified. Patient would benefit from continued skilled Physical Therapy to improve functional independence and quality of life.          Patient's/ family goals   home    Time in  1113  Time out  1128    Total Treatment Time  15 minutes      CPT codes:    Therapeutic exercises (05431)   9 minutes  1 unit(s)  Non billable time 6 minutes    Jeremi Muñoz PTA [id number]#[id number]

## 2022-10-17 NOTE — PLAN OF CARE
Problem: Discharge Planning  Goal: Discharge to home or other facility with appropriate resources  10/16/2022 2124 by Mariana Bonilla RN  Outcome: Progressing  10/16/2022 1210 by Adelina Villagomez RN  Outcome: Progressing     Problem: Safety - Adult  Goal: Free from fall injury  10/16/2022 2124 by Mariana Bonilla RN  Outcome: Progressing  10/16/2022 1210 by Adelina Villagomez RN  Outcome: Progressing     Problem: ABCDS Injury Assessment  Goal: Absence of physical injury  10/16/2022 2124 by Mariana Bonilla RN  Outcome: Progressing  10/16/2022 1210 by Adelina Villagomez RN  Outcome: Progressing     Problem: Skin/Tissue Integrity  Goal: Absence of new skin breakdown  Description: 1. Monitor for areas of redness and/or skin breakdown  2. Assess vascular access sites hourly  3. Every 4-6 hours minimum:  Change oxygen saturation probe site  4. Every 4-6 hours:  If on nasal continuous positive airway pressure, respiratory therapy assess nares and determine need for appliance change or resting period.   10/16/2022 2124 by Mariana Bonilla RN  Outcome: Progressing  10/16/2022 1210 by Adelina Villagomez RN  Outcome: Progressing     Problem: Pain  Goal: Verbalizes/displays adequate comfort level or baseline comfort level  10/16/2022 2124 by Mariana Bonilla RN  Outcome: Progressing  10/16/2022 1210 by Adelina Villagomez RN  Outcome: Progressing

## 2022-10-17 NOTE — PROGRESS NOTES
Department of Internal Medicine  General Internal Medicine  Attending Progress Note      SUBJECTIVE:    Feels ok  Started physical therapy    Medications    Current Facility-Administered Medications: enoxaparin Sodium (LOVENOX) injection 30 mg, 30 mg, SubCUTAneous, BID  diphenhydrAMINE-zinc acetate cream, , Topical, TID PRN  ibuprofen (ADVIL;MOTRIN) tablet 400 mg, 400 mg, Oral, Q8H PRN  acetaminophen (TYLENOL) tablet 650 mg, 650 mg, Oral, Q6H PRN  lisinopril (PRINIVIL;ZESTRIL) tablet 20 mg, 20 mg, Oral, BID    Physical    VITALS:  BP (!) 150/80   Pulse 72   Temp 97.5 °F (36.4 °C) (Oral)   Resp 18   Ht 5' 1\" (1.549 m)   Wt 248 lb (112.5 kg)   SpO2 99%   BMI 46.86 kg/m²   TEMPERATURE:  Current - Temp: 97.5 °F (36.4 °C); Max - Temp  Av.5 °F (36.4 °C)  Min: 97.5 °F (36.4 °C)  Max: 97.5 °F (36.4 °C)  RESPIRATIONS RANGE: Resp  Av  Min: 18  Max: 18  PULSE RANGE: Pulse  Av  Min: 72  Max: 86  BLOOD PRESSURE RANGE:  Systolic (42LNL), MEME:370 , Min:150 , EV   ; Diastolic (71EWW), MTF:52, Min:74, Max:80    PULSE OXIMETRY RANGE: SpO2  Av.5 %  Min: 96 %  Max: 99 %  24HR INTAKE/OUTPUT:    Intake/Output Summary (Last 24 hours) at 10/17/2022 1705  Last data filed at 10/17/2022 1422  Gross per 24 hour   Intake 1340 ml   Output 1100 ml   Net 240 ml       CONSTITUTIONAL: Awake, no distress, appears as stated age. HEENT: Normocephalic atraumatic. Pupils are equal and reactive bilaterally. Extraocular movements are intact. No pallor or icterus appreciated. NECK: Supple, no JVD , no lymphadenopathy, no bruits, no thyromegaly  LUNGS: Clear to auscultation bilaterally. CARDIOVASCULAR: Regular rate and rhythm, no murmur rub or gallop. ABDOMEN: Soft, nontender, bowel sounds are positive, no organomegaly appreciated. Obese  NEUROLOGIC: Awake, alert, oriented x 3 , no focal deficits noted. EXT: No clubbing, or cyanosis.   Lymphedema b/l lower extremities  Bandages and splint Left lower extremity. ASSESSMENT AND PLAN      1. Medial and lateral  left tibial plateau fractures. Patient admitted and orthopedic consultation was obtained. She is nonweightbearing on that leg. She is on Lovenox for DVT prophylaxis. PT /OT consultation and  consultation. Awaiting Orthopedic input from Dr Mckeon/Dr Allan Luis regarding ?surgical intervention  Discussed with Dr. Brook Naranjo today and he will touch base with Dr. Allan Luis and let me know the plan. Tylenol 650 mg q 6 hrs as needed and motrin 400 mg tid prn for pain  2. Hypertension continue lisinopril 20 mg twice daily. 3.  Chronic lymphedema of lower extremities. 4.  Generalized osteoarthritis. 5.  Osteopenia continue vitamin D supplementation. Referral made for temporary placement for rehab patient lives alone and needs a lot of help and arrangements at home before discharge home. She has to navigate several steps to get to the house.     Diamond Mcnamara MD, MD.  5:05 PM  10/17/2022

## 2022-10-17 NOTE — PROGRESS NOTES
Pharmacist Review and Automatic Dose Adjustment of Prophylactic Enoxaparin    *Review reason for admission/hospital problem list*    The reviewing pharmacist has made an adjustment to the ordered enoxaparin dose or converted to UFH per the approved [facility] protocol and table as identified below. Mahnaz Zamorano is a 74 y.o. female. Recent Labs     10/14/22  1236   CREATININE 0.6       Estimated Creatinine Clearance: 96 mL/min (based on SCr of 0.6 mg/dL). Recent Labs     10/14/22  1236   HGB 14.1   HCT 41.4        No results for input(s): INR in the last 72 hours. Height:   Ht Readings from Last 1 Encounters:   10/14/22 5' 1\" (1.549 m)     Weight:  Wt Readings from Last 1 Encounters:   10/14/22 248 lb (112.5 kg)               Plan: Based upon the patient's weight and renal function, the ordered enoxaparin dose of 40 mg daily has been changed/converted to 30 mg twice daily.       Thank you,  Tish Blandon, [facility]  10/14/2022, 4:31 PM

## 2022-10-17 NOTE — CARE COORDINATION
10/17/2022: SS Note/Discharge plan:  Met with pt to confirm her transition of care plan, reviewed PT evals and recommendation for [facility], pt is now agreeable to a temporary rehab placement, SNF list reviewed and pt prefers [facility] as first SNF choice, referral made to [facility] liaison and awaiting chart review and acceptance under pt's Medicare, NO PRE CERT NEEDED, pt relayed [facility] as 2nd choice and [facility] and her 3rd choice, sw to follow, nursing informed.  Electronically signed by PATY Banks on 10/17/2022 at 3:08 PM

## 2022-10-17 NOTE — PROGRESS NOTES
Physical Therapy    Physical Therapy Treatment Note/Plan of Care    Room #:  5369/4035-37  Patient Name: Arnav Vega  YOB: 1950  MRN: [id number]    Date of Service: 10/17/2022     Tentative placement recommendation: Subacute rehab  Equipment recommendation: To be determined      Evaluating Physical Therapist: Alise Padron  #[id number]      Specific Provider Orders/Date/Referring Provider :  10/15/22 0845    PT eval and treat  Start:  10/15/22 0845,   End:  10/15/22 0845,   ONE TIME,   Standing Count:  1 Occurrences,   Darlene Sweeney MD     Admitting Diagnosis:   Unable to ambulate [R26.2]  Tibial plateau fracture, left, closed, initial encounter [S82.142A]  Closed fracture of left tibial plateau, initial encounter [S82.142A]    Admitted with    left leg and knee pain, felt a pop in her left knee   Progressed tibial plateau fracture  , non surgical per ortho    Surgery: none  Visit Diagnoses         Codes    Unable to ambulate    -  Primary R26.2    Closed fracture of left tibial plateau, initial encounter     S82.142A            Patient Active Problem List   Diagnosis    DJD (degenerative joint disease) of knee    Primary osteoarthritis of both knees    DDD (degenerative disc disease), lumbar    Lumbosacral radiculopathy at L4    Lumbar spinal stenosis    Tibial plateau fracture, left, closed, initial encounter        ASSESSMENT of Current Deficits Patient exhibits decreased strength, balance, endurance, and coordination impairing functional mobility, transfers, tolerance to activity, and bed mobility  are barriers to d/c and require skilled intervention during hospital stay to attain wheelchair  level of function including trfs and mobility. Decreased strength, balance and endurance  increases patient's risk for fall along with new restrictive wt bearing and knee immobilizer.  Patient with increased difficultly maintaining non weight bearing through the LLE while scooting along bedside with max cues for correction and NWB precautions. Attempted to stand however patient non compliant with NWB completely; activity was terminated. PHYSICAL THERAPY  PLAN OF CARE       Physical therapy plan of care is established based on physician order,  patient diagnosis and clinical assessment    Current Treatment Recommendations:    -Bed Mobility: Lower extremity exercises , Upper extremity exercises , and Trunk control activities   -Sitting Balance: Incorporate reaching activities to activate trunk muscles , Facilitate postural control in all planes , and seated tricep pushup to facilitate wheelchair mobility and slide board / stand pivot trf  -Standing Balance: Perform strengthening exercises in standing to promote motor control with or without upper extremity support  and Perform sit to stand activities maintaining NWB (non-weight bearing) weightbearing left lower extremity   -Transfers: Cues for hand placement, technique and safety. Provide stabilization to prevent fall  and Provide instruction on proper hand and right lower extremity placement to maintain NWB (non-weight bearing) weightbearing left lower extremity   -Endurance: Utilize Supervised activities to increase level of endurance to allow for safe functional mobility including transfers and gait   Instruct slide board trf in unable to maintain   non weight bearing Left lower extremity in standing for pivot    PT long term treatment goals are located in below grid    Patient and or family understand(s) diagnosis, prognosis, and plan of care. Frequency of treatments: Patient will be seen  daily.          Prior Level of Function: Patient ambulated independently    Rehab Potential: good   for baseline    Past medical history:   Past Medical History:   Diagnosis Date    Heart murmur     Hypertension     Lymphedema     Metabolic syndrome     Osteoarthritis     Osteopenia     Pinched nerve      Past Surgical History:   Procedure Laterality Within functional limits  with exception of left knee  due to knee immobilizer and left ankle neutral dorsiflexion  Increase range of motion 10% of affected joints    Strength BUE:   4/5  RLE:  4-/5  LLE:  3-/5  Increase strength in affected mm groups by 1/3 grade   Balance Sitting EOB:  fair    Dynamic Standing:  not assessed   Sitting EOB: fair   Dynamic Standing: not assessed    Sitting EOB:  good    Dynamic Standing: fair with  walker      Patient is Alert & Oriented x person, place, time, and situation and follows directions    Sensation:  Patient  denies numbness/tingling however reports issues with sciatica intermittently  Edema:  yes bilateral lower extremities   Endurance: fair          Patient education  Patient educated on role of Physical Therapy, risks of immobility, safety and plan of care,  importance of mobility while in hospital , ankle pumps, quad set and glut set for edema control, blood clot prevention, safety , and weight bearing status      Patient response to education:   Pt verbalized understanding Pt demonstrated skill Pt requires further education in this area   Yes Partial Yes      Treatment:  Patient practiced and was instructed/facilitated in the following treatment: Patient   Sat edge of bed 12 minutes with Minimal assist of 1 to increase dynamic sitting balance and activity tolerance. Progressed to supervision. Scooting at edge of bed with cues for maintain NWB. Assisted back to supine with head of bed elevated. Max of 2 to head of bed and scoot left for center. Therapeutic Exercises:  not performed     At end of session, patient in bed with  alarm  call light and phone within reach,  all lines and tubes intact, nursing notified. Patient would benefit from continued skilled Physical Therapy to improve functional independence and quality of life.          Patient's/ family goals   home    Time in  120  Time out  145    Total Treatment Time  25 minutes      CPT codes:    Therapeutic activities (12552)   25 minutes  2 unit(s)    Gabrielle Haney [facility] [place] [id number]

## 2022-10-18 VITALS
WEIGHT: 248 LBS | OXYGEN SATURATION: 97 % | TEMPERATURE: 97.9 F | SYSTOLIC BLOOD PRESSURE: 166 MMHG | DIASTOLIC BLOOD PRESSURE: 62 MMHG | HEART RATE: 66 BPM | HEIGHT: 61 IN | BODY MASS INDEX: 46.82 KG/M2 | RESPIRATION RATE: 17 BRPM

## 2022-10-18 LAB — SARS-COV-2, NAAT: NOT DETECTED

## 2022-10-18 PROCEDURE — 97110 THERAPEUTIC EXERCISES: CPT

## 2022-10-18 PROCEDURE — 6370000000 HC RX 637 (ALT 250 FOR IP): Performed by: INTERNAL MEDICINE

## 2022-10-18 PROCEDURE — 87635 SARS-COV-2 COVID-19 AMP PRB: CPT

## 2022-10-18 PROCEDURE — 99238 HOSP IP/OBS DSCHRG MGMT 30/<: CPT | Performed by: INTERNAL MEDICINE

## 2022-10-18 PROCEDURE — 6360000002 HC RX W HCPCS: Performed by: INTERNAL MEDICINE

## 2022-10-18 PROCEDURE — 97530 THERAPEUTIC ACTIVITIES: CPT

## 2022-10-18 PROCEDURE — 97165 OT EVAL LOW COMPLEX 30 MIN: CPT

## 2022-10-18 RX ORDER — ASPIRIN 81 MG/1
81 TABLET ORAL 2 TIMES DAILY
Qty: 30 TABLET | Refills: 0 | Status: SHIPPED | OUTPATIENT
Start: 2022-10-18

## 2022-10-18 RX ADMIN — LISINOPRIL 20 MG: 20 TABLET ORAL at 08:00

## 2022-10-18 RX ADMIN — ENOXAPARIN SODIUM 30 MG: 100 INJECTION SUBCUTANEOUS at 08:00

## 2022-10-18 ASSESSMENT — PAIN SCALES - GENERAL
PAINLEVEL_OUTOF10: 0
PAINLEVEL_OUTOF10: 0

## 2022-10-18 ASSESSMENT — PAIN DESCRIPTION - DESCRIPTORS: DESCRIPTORS: ACHING

## 2022-10-18 ASSESSMENT — PAIN DESCRIPTION - LOCATION: LOCATION: LEG

## 2022-10-18 ASSESSMENT — PAIN DESCRIPTION - ORIENTATION: ORIENTATION: LEFT

## 2022-10-18 NOTE — PROGRESS NOTES
[facility]  [address] [place]        Date:10/18/2022                                                  Patient Name: Mohini Haines    MRN: [id number]    : 1950    Room: [address] [place] [address]      Evaluating OT: Megan Goel OTR/L; [id number]     Referring Provider and Specific Provider Orders/Date:      10/15/22 0845  OT eval and treat  Start:  10/15/22 0845,   End:  10/15/22 0845,   ONE TIME,   Standing Count:  1 Occurrences,   Ashli Rey MD      Placement Recommendation: Subacute        Diagnosis:   1. Unable to ambulate    2.  Closed fracture of left tibial plateau, initial encounter         Surgery: none       Pertinent Medical History:       Past Medical History:   Diagnosis Date    Heart murmur     Hypertension     Lymphedema     Metabolic syndrome     Osteoarthritis     Osteopenia     Pinched nerve          Past Surgical History:   Procedure Laterality Date    ABDOMEN SURGERY      VEIN SURGERY        Precautions:  Fall Risk, NWB: L LE, knee immobilizer, L medial tibial plateau osteophyte fracture, pure wick      Assessment of current deficits:     [x] Functional mobility  [x]ADLs  [x] Strength               []Cognition    [x] Functional transfers   [x] IADLs         [] Safety Awareness   [x]Endurance    [] Fine Coordination              [x] Balance      [] Vision/perception   []Sensation     []Gross Motor Coordination  [] ROM  [] Delirium                   [] Motor Control     OT PLAN OF CARE   OT POC based on physician orders, patient diagnosis and results of clinical assessment    Frequency/Duration 1-3 days/wk for 2 weeks PRN     Specific OT Treatment Interventions to include:   * Instruction/training on adapted ADL techniques and AE recommendations to increase functional independence within precautions       * Training on energy conservation strategies, correct breathing pattern and techniques to improve independence/tolerance for self-care routine  * Functional transfer/mobility training/DME recommendations for increased independence, safety, and fall prevention  * Patient/Family education to increase follow through with safety techniques and functional independence  * Recommendation of environmental modifications for increased safety with functional transfers/mobility and ADLs  * Therapeutic exercise to improve motor endurance, ROM, and functional strength for ADLs/functional transfers  * Therapeutic activities to facilitate/challenge dynamic balance, stand tolerance for increased safety and independence with ADLs  * Positioning to improve skin integrity, interaction with environment and functional independence    Recommended Adaptive Equipment: TBD at rehab      Home Living: alone; single family home, 1 story, 4+1 steps to enter with rail, walk in shower, laundry in the basement. Equipment owned: wheeled walker, straight cane, bedside commode, shower chair, grab bars, reacher's (2), 1 crutch     Prior Level of Function: Independent with ADLs , Independent with IADLs; ambulated with straight cane     Driving: yes   Occupation: retired      Pain Level: no pain at time of evaluation; Nursing notified.       Cognition: A&O: 4/4; Follows 3 step directions   Memory: good    Sequencing: good    Problem solving: good    Judgement/safety: good     [facility]   AM-PAC Daily Activity Inpatient   How much help for putting on and taking off regular lower body clothing?: Total  How much help for Bathing?: A Lot  How much help for Toileting?: Total  How much help for putting on and taking off regular upper body clothing?: A Lot  How much help for taking care of personal grooming?: A Little  How much help for eating meals?: None  AM-[facility] Inpatient Daily Activity Raw Score: 13  AM-PAC Inpatient ADL T-Scale Score : 32.03  ADL Inpatient CMS 0-100% Score: 63.03  ADL Inpatient CMS G-Code Modifier : CL     Functional Assessment:    Initial Eval Status  Date: 10/18/22 Treatment Status  Date: STGs = LTGs  Time frame: 10-14 days   Feeding Independent   Independent    Grooming Supervision   Independent    UB Dressing Moderate Assist to doff and don hospital gown while seated EOB  Independent    LB Dressing Dependent to doff and don socks  Modified [place]    Bathing Moderate Assist  Minimal Assist    Toileting Dependent   Minimal Assist    Bed Mobility  Supine to sit: Supervision, pt used UE's to guide L LE in knee immobilizer off edge of bed   Sit to supine: Moderate Assist   Supine to sit: Independent   Sit to supine: Independent    Functional Transfers Moderate Assist from EOB to wheeled walker x 4 reps. Moderate verbal cues to maintain NWB: L LE. Transfer training with maximal verbal cues for hand placement throughout session to improve safety. Supervision    Functional Mobility Moderate Assist x 2 with wheeled walker to improve balance to side step towards head of bed using R foot to toe heel shuffle, fair adherence to NWB: L LE, verbal cues for walker sequence and safety. Assistance for walker negotiation. Minimal Assist    Balance Sitting:     Static: good     Dynamic: fair plus   Standing: fair minus with wheeled walker     Activity Tolerance Fair  good    Visual/  Perceptual Glasses: right                 Hand Dominance: right      AROM (PROM) Strength Additional Info:    RUE  WFL 4/5 good  and wfl FMC/dexterity noted during ADL tasks     LUE WFL 4/5 good  and wfl FMC/dexterity noted during ADL tasks       Hearing: [facility]   Sensation:  No c/o numbness or tingling  Tone: WFL   Edema: yes, B LE's     Comments: Upon arrival the patient was supine. At end of session, patient was supine with call light and phone within reach, all lines and tubes intact. Overall patient demonstrated decreased independence and safety during completion of ADL/functional transfer/mobility tasks.   Pt would benefit from continued skilled OT to increase safety and independence with completion of ADL/IADL tasks for functional independence and quality of life. Treatment: OT treatment provided this date includes:   Instruction/training on safety and adapted techniques for completion of ADLs   Instruction/training on safe functional mobility/transfer techniques   Instruction/training on energy conservation/work simplification for completion of ADLs   Proper Positioning/Alignment   Instruction/training in weight bearing status and walker sequence     Rehab Potential: Good for established goals. Patient / Family Goal: go to rehab      Patient and/or family were instructed on functional diagnosis, prognosis/goals and OT plan of care. Demonstrated good understanding. Eval Complexity: Low    Time In: 9:10am   Time Out: 10:11am    Total Treatment Time: 51      Min Units   OT Eval Low 97165  X  1    OT Eval Medium 69110      OT Eval High 32951      OT Re-Eval 86819            ADL/Self Care 25785 10    Therapeutic Activities 41581  34  3   Therapeutic Ex 79413       Orthotic Management 29982       Manual 01754     Neuro Re-Ed 30589       Non-Billable Time        Evaluation Time additionally includes thorough review of current medical information, gathering information on past medical history/social history and prior level of function, interpretation of standardized testing/informal observation of tasks, assessment of data and development of plan of care and goals.         Evaluating OT: Brad Wheeler OTR/L; [id number]

## 2022-10-18 NOTE — DISCHARGE SUMMARY
Physician Discharge Summary     Patient ID:  Kay Libman is a 74 y.o. female   77-year-old female presenting emergency department for left leg and knee pain, felt a pop in her left knee the day prior and felt like she was unable to walk. Symptoms moderate to severe in severity, persistent, worse with ambulation, improved by Motrin, sudden onset. X-rays of the left knee revealed chronic tibial plateau fracture and degenerative joint disease. CT scan of the knee was done which showed medial and lateral tibial plateau fractures. Patient was admitted and orthopedic consultation obtained. Admit date: 10/14/2022    Discharge date: 10/18/2022     Discharge Diagnoses:   1. Medial and lateral  left tibial plateau fractures. 2.  Hypertension continue lisinopril 20 mg twice daily. 3.  Chronic lymphedema of lower extremities. 4.  Generalized osteoarthritis. 5.  Osteopenia continue vitamin D supplementation. 6.Morbid obesity    Hospital Course:   1. Medial and lateral  left tibial plateau fractures. Patient admitted and orthopedic consultation was obtained. She is nonweightbearing on that leg. She was on Lovenox for DVT prophylaxis during this admission. Orthopedics followed and no surgical intervention planned. Discussed with orthopedic team DVT prophylaxis i she was placed on aspirin 81 mg twice daily. She will follow-up with them as outpatient. 2.  Hypertension continue lisinopril 20 mg twice daily. 3.  Chronic lymphedema of lower extremities. 4.  Generalized osteoarthritis. 5.  Osteopenia continue vitamin D supplementation. Referral made for temporary placement for rehab patient lives alone and needs a lot of help and arrangements at home before discharge home. She has to navigate several steps to get to the house. [facility] She was okay for discharge by orthopedic team and follow-up with them as outpatient.     Disposition:   Rehab placement for physical therapy. Discharge condition:  Stable    Patient Instructions:   Discharge Medication List as of 10/18/2022 11:49 AM        START taking these medications    Details   aspirin EC 81 MG EC tablet Take 1 tablet by mouth in the morning and at bedtime, Disp-30 tablet, R-0Normal           CONTINUE these medications which have NOT CHANGED    Details   b complex vitamins capsule Take 1 capsule by mouth Daily with supperHistorical Med      vitamin B-12 (CYANOCOBALAMIN) 1000 MCG tablet Take 1,000 mcg by mouth dailyHistorical Med      calcium carbonate (CALTRATE 600) 1500 (600 Ca) MG TABS tablet Take 600 mg by mouth dailyHistorical Med      Omega-3 Fatty Acids (FISH OIL) 1000 MG capsule Take 2,000 mg by mouth 2 times dailyHistorical Med      Cholecalciferol (VITAMIN D) 50 MCG (2000 UT) CAPS capsule Take 2,000 Units by mouth in the morning and at bedtimeHistorical Med      Garlic 9494 MG CAPS Take 3,000 mg by mouth in the morning and at bedtimeHistorical Med      cod liver oil CAPS Take 2 capsules by mouth in the morning and at bedtimeHistorical Med      lisinopril (PRINIVIL;ZESTRIL) 20 MG tablet Take 20 mg by mouth 2 times dailyHistorical Med      Multiple Vitamins-Minerals (CENTRUM PO) Take 1 tablet by mouth dailyHistorical Med           STOP taking these medications       ibuprofen (ADVIL LIQUI-GELS MINIS) 200 MG CAPS Comments:   Reason for Stopping: Follow-up with orthopedic team as planned. Follow-up with me in the office once you are discharged from a skilled nursing facility.       Signed:  Diamond Mcnamara MD  10/18/2022

## 2022-10-18 NOTE — PROGRESS NOTES
Physical Therapy    Physical Therapy Treatment Note/Plan of Care    Room #:  9739/3578-94  Patient Name: Shanta Hebert  YOB: 1950  MRN: [id number]    Date of Service: 10/18/2022     Tentative placement recommendation: Subacute rehab  Equipment recommendation: To be determined      Evaluating Physical Therapist: Jim Foley, [address] [facility]  #[id number]      Specific Provider Orders/Date/Referring Provider :  10/15/22 0845    PT eval and treat  Start:  10/15/22 0845,   End:  10/15/22 0845,   ONE TIME,   Standing Count:  1 Occurrences,   Reji Bai MD     Admitting Diagnosis:   Unable to ambulate [R26.2]  Tibial plateau fracture, left, closed, initial encounter [S82.142A]  Closed fracture of left tibial plateau, initial encounter [S82.142A]    Admitted with    left leg and knee pain, felt a pop in her left knee   Progressed tibial plateau fracture  , non surgical per ortho    Surgery: none  Visit Diagnoses         Codes    Unable to ambulate    -  Primary R26.2    Closed fracture of left tibial plateau, initial encounter     S82.142A            Patient Active Problem List   Diagnosis    DJD (degenerative joint disease) of knee    Primary osteoarthritis of both knees    DDD (degenerative disc disease), lumbar    Lumbosacral radiculopathy at L4    Lumbar spinal stenosis    Tibial plateau fracture, left, closed, initial encounter        ASSESSMENT of Current Deficits Patient exhibits decreased strength, balance, endurance, and coordination impairing functional mobility, transfers, tolerance to activity, and bed mobility  are barriers to d/c and require skilled intervention during hospital stay to attain wheelchair  level of function including trfs and mobility.  Decreased strength, balance and endurance  increases patient's risk for fall patient needing cuing to keep non weight bearing while standing at edge of bed and taking side steps; partial treatment with OT due to  Skilled intervention of 2 clinicians required to facilitate participation, promote active engagement to progress towards goals and prevent injury of patient and staff, patient did perform function part of therapy much better with assist of 2. Patient may benefit from a standard walker for functional activity. PHYSICAL THERAPY  PLAN OF CARE       Physical therapy plan of care is established based on physician order,  patient diagnosis and clinical assessment    Current Treatment Recommendations:    -Bed Mobility: Lower extremity exercises , Upper extremity exercises , and Trunk control activities   -Sitting Balance: Incorporate reaching activities to activate trunk muscles , Facilitate postural control in all planes , and seated tricep pushup to facilitate wheelchair mobility and slide board / stand pivot trf  -Standing Balance: Perform strengthening exercises in standing to promote motor control with or without upper extremity support  and Perform sit to stand activities maintaining NWB (non-weight bearing) weightbearing left lower extremity   -Transfers: Cues for hand placement, technique and safety. Provide stabilization to prevent fall  and Provide instruction on proper hand and right lower extremity placement to maintain NWB (non-weight bearing) weightbearing left lower extremity   -Endurance: Utilize Supervised activities to increase level of endurance to allow for safe functional mobility including transfers and gait   Instruct slide board trf in unable to maintain   non weight bearing Left lower extremity in standing for pivot    PT long term treatment goals are located in below grid    Patient and or family understand(s) diagnosis, prognosis, and plan of care. Frequency of treatments: Patient will be seen  daily.          Prior Level of Function: Patient ambulated independently    Rehab Potential: good   for baseline    Past medical history:   Past Medical History:   Diagnosis Date    Heart murmur     Hypertension Lymphedema     Metabolic syndrome     Osteoarthritis     Osteopenia     Pinched nerve      Past Surgical History:   Procedure Laterality Date    ABDOMEN SURGERY  1984    VEIN SURGERY         SUBJECTIVE:    Precautions: , falls, alarm, and knee immobilzer    , non weight bearing Left lower extremity     Social history: Patient lives alone in a ranch home  with 5 steps  to enter with [facility] owned: [facility]   How much difficulty turning over in bed?: A Little  How much difficulty sitting down on / standing up from a chair with arms?: A Lot  How much difficulty moving from lying on back to sitting on side of bed?: A Little  How much help from another person moving to and from a bed to a chair?: A Lot  How much help from another person needed to walk in hospital room?: Total  How much help from another person for climbing 3-5 steps with a railing?: Total  AM-PAC Inpatient Mobility Raw Score : 12  AM-PAC Inpatient T-Scale Score : 35.33  Mobility Inpatient CMS 0-100% Score: 68.66  Mobility Inpatient CMS G-Code Modifier : CL    Nursing cleared patient for PT treatment. OBJECTIVE;   Initial Evaluation  Date: 10/16/2022 Treatment Date:    10/18/2022   Short Term/ Long Term   Goals   Was pt agreeable to Eval/treatment? Yes Yes  To be met in 2 days   Pain level   0/10    0/10    Bed Mobility    Rolling: Minimal assist of 1    Supine to sit: Moderate assist of 1  for Left lower extremity and trunk  Sit to supine: Moderate assist of 1  for Left lower extremity   Scooting: Moderate assist of 1  seated laterally 3 sets of 5 scoots Rolling: Not assessed    Supine to sit: Not assessed    Sit to supine: Moderate assist of 1   Scooting: Moderate assist of 1    Rolling: Independent    Supine to sit:  Independent    Sit to supine: Independent    Scooting: Independent     Transfers Sit to stand: Not assessed    discussed options of stand pivot vs slide board Sit to stand: Moderate assist of  2 cues for hand placement, non weight bearing on LLE. Sit to stand: Supervision     Slide board : supervision    Gait   ~2 feet performing Heel to toe steps at edge of bed with wheeled walker moderate assist of 2 multiple seated rest breaks cues for weight shifting, heel to toe steps, non weight bearing to LLE. And use of UE to push through wheeled walker. 5 feet using  wheeled walker with Moderate assist of 1        ROM Within functional limits  with exception of left knee  due to knee immobilizer and left ankle neutral dorsiflexion  Increase range of motion 10% of affected joints    Strength BUE:   4/5  RLE:  4-/5  LLE:  3-/5  Increase strength in affected mm groups by 1/3 grade   Balance Sitting EOB:  fair    Dynamic Standing:  not assessed   Sitting EOB: fair   Dynamic Standing: fair cues needed for NWB LLE. Sitting EOB:  good    Dynamic Standing: fair with  walker      Patient is Alert & Oriented x person, place, time, and situation and follows directions    Sensation:  Patient  denies numbness/tingling however reports issues with sciatica intermittently  Edema:  yes bilateral lower extremities   Endurance: fair          Patient education  Patient educated on role of Physical Therapy, risks of immobility, safety and plan of care,  importance of mobility while in hospital , ankle pumps, quad set and glut set for edema control, blood clot prevention, safety , and weight bearing status      Patient response to education:   Pt verbalized understanding Pt demonstrated skill Pt requires further education in this area   Yes Partial Yes      Treatment:  Patient practiced and was instructed/facilitated in the following treatment: Patient    sat edge of bed. Performed x 4 sit to stands with moderate cues to maintain NWB. Heel to toe steps at edge of bed, cues for sequencing. Assisted back to supine. Performed supine exercise.  Tried chair position however patient felt uncomfortable patient was then positioned for comfort. Therapeutic Exercises:  ankle pumps, quad sets, hip abduction/adduction, and straight leg raise x 12-15 reps and heel slides to right LE. At end of session, patient in bed with  alarm  call light and phone within reach,  all lines and tubes intact, nursing notified. Patient would benefit from continued skilled Physical Therapy to improve functional independence and quality of life.          Patient's/ family goals   home    Time in  46  Time out  1029    Total Treatment Time  39 minutes      CPT codes:    Therapeutic activities (56237)   16 minutes  1 unit(s)  Therapeutic exercises (87179)   9 minutes  1 unit(s)  Non billable time 14 minutes due to working with [address]

## 2022-10-18 NOTE — PROGRESS NOTES
Physician Progress Note      Azul Cao  [facility] #:                  [id number]  :                       1950  ADMIT DATE:       10/14/2022 9:38 AM  DISCH DATE:        10/18/2022 1:23 PM  RESPONDING  PROVIDER #:        Katlin Green MD          QUERY TEXT:    Patient admitted with BMI 46.86. If possible, please document in progress   notes and discharge summary if you are evaluating and /or treating any of the   following: Thank you, Gi Shah RN -[phone]  Options provided:  -- Obesity  -- Morbid obesity  -- Severe obesity  -- Overweight  -- BMI not clinically significant  -- Other - I will add my own diagnosis  -- Disagree - Not applicable / Not valid  -- Disagree - Clinically unable to determine / Unknown  -- Refer to Clinical Documentation Reviewer    PROVIDER RESPONSE TEXT:    This patient has morbid obesity. Query created by: Michelle Poe on 10/17/2022 8:43 AM      QUERY TEXT:    Pt admitted with knee pain, Medial and lateral  left tibial plateau fractures. Pt noted to have osteopenia, osteoarthritis, osteophytosis documented. If   possible, please document in progress notes and discharge summary if you are   evaluating and/or treating any of the following: The medical record reflects the following:  Risk Factors: Medial and lateral  left tibial plateau fractures,   osteoarthritis, osteopenia, hx cortisone injection 2 months ago. Clinical Indicators: Xray: chronic fracture of the medial tibial plateau (more   displaced than 5/10 report),  CT moderate to severe osteophytosis of all 3   joint compartments  Treatment: PT/ OT/ SS/, Ortho consult with non surgical knee immobilizer and   pain control. Vitamin D, Calcium.     Thank you, Gi Shah RN [phone]  Options provided:  -- Pathological Medial and lateral left tibial plateau fractures due to   osteopenia  -- Osteoporotic Medial and lateral left tibial plateau fractures  -- Other - I will add my own diagnosis  -- Disagree - Not applicable / Not valid  -- Disagree - Clinically unable to determine / Unknown  -- Refer to Clinical Documentation Reviewer    PROVIDER RESPONSE TEXT:    This patient has osteoporotic Medial and lateral left tibial plateau   fractures.     Query created by: Ebony Simpson on 10/17/2022 1:32 PM      Electronically signed by:  Gideon Ayala MD 10/18/2022 5:27 PM

## 2022-10-18 NOTE — PLAN OF CARE
Problem: Safety - Adult  Goal: Free from fall injury  10/18/2022 0855 by Abdulaziz Neumann RN  Outcome: Progressing     Problem: ABCDS Injury Assessment  Goal: Absence of physical injury  10/18/2022 0855 by Abdulaziz Neumann RN  Outcome: Progressing     Problem: Skin/Tissue Integrity  Goal: Absence of new skin breakdown  Description: 1. Monitor for areas of redness and/or skin breakdown  2. Assess vascular access sites hourly  3. Every 4-6 hours minimum:  Change oxygen saturation probe site  4. Every 4-6 hours:  If on nasal continuous positive airway pressure, respiratory therapy assess nares and determine need for appliance change or resting period.   10/18/2022 0855 by Abdulaziz Neumann RN  Outcome: Progressing     Problem: Pain  Goal: Verbalizes/displays adequate comfort level or baseline comfort level  10/18/2022 0855 by Abdulaziz Neumann RN  Outcome: Progressing

## 2022-10-18 NOTE — CARE COORDINATION
10/18/2022: SS Note/Discharge plan:  Notified by [facility] admissions for [facility] and [place] [address] for [facility] that they currently have no bed availability, referral made to Kristin garza [facility] who confirmed they do have a rehab bed available, awaiting chart review and acceptance for skilled Medicare admission, NO PRE CERT NEEDED, pt and nursing notified. sw will follow. Electronically signed by PATY Mcleod on 10/18/2022 at 9:37 AM

## 2022-10-18 NOTE — CARE COORDINATION
10/18/2022: SS Note/Discharge plan:  Notified by Andreas Trevizo admissions at [facility] that they have accepted pt and transfer confirmed for today at 1:00pm via [facility], request a RAPID COVID TEST prior to transfer, pt notified and vm message left for her friend, Alley Kelsey regarding her transfer arrangements, COLIN, ambulance form and HENS completed, nursing informed.  Electronically signed by PATY Arvizu on 10/18/2022 at 10:35 AM

## 2022-10-18 NOTE — PROGRESS NOTES
Department of Orthopedic Surgery  Resident Progress Note    Patient seen and examined. Pain well controlled. No new complaints. Denies chest pain, shortness of breath, calf pain, dizziness/lightheadedness. VITALS:  BP (!) 166/62   Pulse 66   Temp 97.9 °F (36.6 °C) (Oral)   Resp 17   Ht 5' 1\" (1.549 m)   Wt 248 lb (112.5 kg)   SpO2 97%   BMI 46.86 kg/m²     GENERAL: alert and oriented  MUSCULOSKELETAL:   left lower extremity:  Dressing and knee immobilizer C/D/I  Compartments soft and compressible, calf non-tender  Palpable dorsalis pedis and posterior tibialis pulse, brisk cap refill to toes, foot warm and perfused  Sensation intact to light touch in sural/deep peroneal/superficial peroneal/saphenous/posterior tibial nerve distributions to foot/ankle. Demonstrates active ankle plantar/dorsiflexion/great toe extension    CBC:   Lab Results   Component Value Date/Time    WBC 6.4 10/17/2022 04:56 AM    HGB 13.1 10/17/2022 04:56 AM    HCT 40.0 10/17/2022 04:56 AM     10/17/2022 04:56 AM       ASSESSMENT  L medial tibial plateau osteophyte fracture    PLAN    NWB LLE  Maintain knee immobilizer  Continue PT/OT  Okay to follow up outpatient. No planned orthopedic intervention at this time.   Discussed with Dr. Peter Diaz

## 2022-10-18 NOTE — DISCHARGE INSTR - COC
Continuity of Care Form    Patient Name: Elisha De Leon   :  1950  MRN:  [id number]    Admit date:  10/14/2022  Discharge date:  10/18/2022    Code Status Order: Full Code   Advance Directives:     Admitting Physician:  Gideon Ayala MD  PCP: Gideon Ayala MD    Discharging Nurse: [facility] Unit/Room#: 2163/1783-41  Discharging Unit Phone Number: [phone]    Emergency Contact:   Extended Emergency Contact Information  Primary Emergency Contact: Maral Coles  Mobile Phone: [phone]  Relation: Other  Secondary Emergency Contact: Chong Barajas  Relation: Other    Past Surgical History:  Past Surgical History:   Procedure Laterality Date    ABDOMEN SURGERY  1984    VEIN SURGERY         Immunization History:   Immunization History   Administered Date(s) Administered    COVID-19, MODERNA BLUE border, Primary or Immunocompromised, (age 12y+), IM, 100 mcg/0.5mL 2021, 2021    COVID-19, MODERNA Booster BLUE border, (age 18y+), IM, 50mcg/0.25mL 11/15/2021    COVID-19, PFIZER Bivalent BOOSTER, (age 12y+), IM, 27 mcg/0.3 mL dose 2022    Pneumococcal Polysaccharide ([id number]) 2018    Tdap (Boostrix, Adacel) 2018    Zoster Recombinant (Shingrix) 2009       Active Problems:  Patient Active Problem List   Diagnosis Code    DJD (degenerative joint disease) of knee M17.9    Primary osteoarthritis of both knees M17.0    DDD (degenerative disc disease), lumbar M51.36    Lumbosacral radiculopathy at L4 M54.17    Lumbar spinal stenosis M48.061    Tibial plateau fracture, left, closed, initial encounter S82.142A       Isolation/Infection:   Isolation            No Isolation          Patient Infection Status       None to display            Nurse Assessment:  Last Vital Signs: BP (!) 166/62   Pulse 66   Temp 97.9 °F (36.6 °C) (Oral)   Resp 17   Ht 5' 1\" (1.549 m)   Wt 248 lb (112.5 kg)   SpO2 97%   BMI 46.86 kg/m²     Last documented pain score (0-10 scale): Pain Level: 0  Last Weight:   Wt Readings from Last 1 Encounters:   10/14/22 248 lb (112.5 kg)     Mental Status:  oriented and alert    IV Access:  - None    Nursing Mobility/ADLs:  Walking   Dependent  Transfer  Dependent  Bathing  Dependent  Dressing  Dependent  Toileting  Dependent  Feeding  Assisted  Med Admin  Assisted  Med Delivery   whole    Wound Care Documentation and Therapy:        Elimination:  Continence: Bowel: No  Bladder: No  Urinary Catheter: None   Colostomy/Ileostomy/Ileal Conduit: No       Date of Last BM: 10/16/2022    Intake/Output Summary (Last 24 hours) at 10/18/2022 1038  Last data filed at 10/18/2022 0852  Gross per 24 hour   Intake 1040 ml   Output 2050 ml   Net -1010 ml     I/O last 3 completed shifts: In: 1520 [P.O.:1520]  Out: 2850 [Urine:2850]    Safety Concerns:     History of Falls (last 30 days) and At Risk for Falls    Impairments/Disabilities:      Vision    Nutrition Therapy:  Current Nutrition Therapy:   - Oral Diet:  Low Sodium (2gm)    Routes of Feeding: Oral  Liquids: Thin Liquids  Daily Fluid Restriction: no  Last Modified Barium Swallow with Video (Video Swallowing Test): not done    Treatments at the Time of Hospital Discharge:   Respiratory Treatments: ***  Oxygen Therapy:  is not on home oxygen therapy.   Ventilator:    - No ventilator support    Rehab Therapies: Physical Therapy and Occupational Therapy  Weight Bearing Status/Restrictions: Non-weight bearing on left leg  Other Medical Equipment (for information only, NOT a DME order):  knee immobilizer  Other Treatments: ***    Patient's personal belongings (please select all that are sent with patient):  Glasses    RN SIGNATURE:  Electronically signed by Nery Easley RN on 10/18/22 at 11:49 AM EDT    CASE MANAGEMENT/SOCIAL WORK SECTION    Inpatient Status Date: 10/14/2022    Readmission Risk Assessment Score:  Readmission Risk              Risk of Unplanned Readmission:  6           Discharging to Facility/ Agency   Name: [facility]  Address:  Phone: [phone]  Fax: [phone]    Dialysis Facility (if applicable)   Name:  Address:  Dialysis Schedule:  Phone:  Fax:    / signature: Electronically signed by PATY Meyer on 10/18/22 at 10:38 AM EDT    PHYSICIAN SECTION    Prognosis: Good    Condition at Discharge: Stable    Rehab Potential (if transferring to Rehab): Good    Recommended Labs or Other Treatments After Discharge: ***    Physician Certification: I certify the above information and transfer of Mahnaz Zamorano  is necessary for the continuing treatment of the diagnosis listed and that she requires [place] for less 30 days.      Update Admission H&P: {CHP DME Changes in VXMPN:[id number]}    PHYSICIAN SIGNATURE:  Electronically signed by Lj Jeronimo MD on 10/18/22 at 10:19 AM EDT

## 2022-10-18 NOTE — DISCHARGE INSTRUCTIONS
Your information:  Name: Shanta Hebert  : 1950    Your instructions:    Discharge to snf. Follow up with primary care provider and specialists as directed. Maintain knee immobilizer. NWB LLE    What to do after you leave the hospital:    Recommended diet:  low sodium diet    Recommended activity: activity as tolerated and NWB LLE        The following personal items were collected during your admission and were returned to you:    Belongings  Dental Appliances: None  Vision - Corrective Lenses: Eyeglasses  Hearing Aid: None  Clothing: Pants, Shirt, Undergarments  Jewelry: None  Body Piercings Removed: No  Electronic Devices: None  Weapons (Notify Protective Services/Security): None  Other Valuables: Other (Comment) ([employer] book)  Home Medications: None  Valuables Given To: Other (Comment) (na)  Provide Name(s) of Who Valuable(s) Were Given To: NA  Responsible person(s) in the waiting room: Carlos Arroyo  Patient approves for provider to speak to responsible person post operatively: Yes    Information obtained by:  By signing below, I understand that if any problems occur once I leave the hospital I am to contact Dr. Josie Caba. I understand and acknowledge receipt of the instructions indicated above.

## 2022-10-19 ENCOUNTER — TELEPHONE (OUTPATIENT)
Dept: ORTHOPEDIC SURGERY | Age: 72
End: 2022-10-19

## 2022-10-19 NOTE — TELEPHONE ENCOUNTER
Spoke with facility and made follow up appointment for tibial plateau fx. Nurse states a pressure sore is forming and asked if the brace can be removed while she is in bed. I strongly advised that the knee must stay in extension if the brace is removed. Patient is oriented and nurse states she will understand the orders.

## 2022-10-24 DIAGNOSIS — M17.12 PRIMARY OSTEOARTHRITIS OF LEFT KNEE: Primary | ICD-10-CM

## 2022-10-25 ENCOUNTER — OFFICE VISIT (OUTPATIENT)
Dept: ORTHOPEDIC SURGERY | Age: 72
End: 2022-10-25
Payer: MEDICARE

## 2022-10-25 VITALS — WEIGHT: 248 LBS | TEMPERATURE: 98 F | BODY MASS INDEX: 46.82 KG/M2 | HEIGHT: 61 IN

## 2022-10-25 DIAGNOSIS — M84.40XA INSUFFICIENCY FRACTURE: ICD-10-CM

## 2022-10-25 DIAGNOSIS — M17.12 PRIMARY OSTEOARTHRITIS OF LEFT KNEE: Primary | ICD-10-CM

## 2022-10-25 DIAGNOSIS — S82.142A CLOSED FRACTURE OF LEFT TIBIAL PLATEAU, INITIAL ENCOUNTER: ICD-10-CM

## 2022-10-25 PROCEDURE — 1111F DSCHRG MED/CURRENT MED MERGE: CPT | Performed by: ORTHOPAEDIC SURGERY

## 2022-10-25 PROCEDURE — G8400 PT W/DXA NO RESULTS DOC: HCPCS | Performed by: ORTHOPAEDIC SURGERY

## 2022-10-25 PROCEDURE — 3017F COLORECTAL CA SCREEN DOC REV: CPT | Performed by: ORTHOPAEDIC SURGERY

## 2022-10-25 PROCEDURE — 99214 OFFICE O/P EST MOD 30 MIN: CPT | Performed by: ORTHOPAEDIC SURGERY

## 2022-10-25 PROCEDURE — G8417 CALC BMI ABV UP PARAM F/U: HCPCS | Performed by: ORTHOPAEDIC SURGERY

## 2022-10-25 PROCEDURE — 1036F TOBACCO NON-USER: CPT | Performed by: ORTHOPAEDIC SURGERY

## 2022-10-25 PROCEDURE — 1090F PRES/ABSN URINE INCON ASSESS: CPT | Performed by: ORTHOPAEDIC SURGERY

## 2022-10-25 PROCEDURE — G8484 FLU IMMUNIZE NO ADMIN: HCPCS | Performed by: ORTHOPAEDIC SURGERY

## 2022-10-25 PROCEDURE — 1123F ACP DISCUSS/DSCN MKR DOCD: CPT | Performed by: ORTHOPAEDIC SURGERY

## 2022-10-25 PROCEDURE — G8427 DOCREV CUR MEDS BY ELIG CLIN: HCPCS | Performed by: ORTHOPAEDIC SURGERY

## 2022-10-25 NOTE — PROGRESS NOTES
Chief Complaint   Patient presents with    Knee Pain     Left Knee, Tibial Plateau FX DOI 68/82/1965, went to the ER, is now in [facility], in a knee imoblizer and is non weightbearing. HPI:    Patient is 74 y.o. female complaining chronic, atraumatic, insidious onset left knee pain for several weeks and was found to have a tibial plateau insuffiencey fracture. She admits to stiffness, deep, aching pain, swelling, difficulty with stairs and ambulating far distances. She admits to gross instability specifically in her Left knee. Previous treatments include rest, ice, and anti-inflammatory medication and HEP without much relief. She is currently in SNF and using a knee immobilizer. ROS:    Skin: (-) rash,(-) psoriasis,(-) eczema, (-)skin cancer. Neurologic: (-)numbness, (-)tingling, (-)headaches, (-) LOC. Cardiovascular: (-) Chest pain, (-) swelling in legs/feet, (-) SOB, (-) cramping in legs/feet with walking.     All other review of systems negative except stated above or in HPI      Past Medical History:   Diagnosis Date    Heart murmur     Hypertension     Lymphedema     Metabolic syndrome     Osteoarthritis     Osteopenia     Pinched nerve      Past Surgical History:   Procedure Laterality Date    ABDOMEN SURGERY  1984    VEIN SURGERY         Current Outpatient Medications:     aspirin EC 81 MG EC tablet, Take 1 tablet by mouth in the morning and at bedtime, Disp: 30 tablet, Rfl: 0    b complex vitamins capsule, Take 1 capsule by mouth Daily with supper, Disp: , Rfl:     vitamin B-12 (CYANOCOBALAMIN) 1000 MCG tablet, Take 1,000 mcg by mouth daily, Disp: , Rfl:     calcium carbonate 1500 (600 Ca) MG TABS tablet, Take 600 mg by mouth daily, Disp: , Rfl:     Omega-3 Fatty Acids (FISH OIL) 1000 MG capsule, Take 2,000 mg by mouth 2 times daily, Disp: , Rfl:     Cholecalciferol (VITAMIN D) 50 MCG (2000 UT) CAPS capsule, Take 2,000 Units by mouth in the morning and at bedtime, Disp: , Rfl: Garlic 2530 MG CAPS, Take 3,000 mg by mouth in the morning and at bedtime, Disp: , Rfl:     cod liver oil CAPS, Take 2 capsules by mouth in the morning and at bedtime, Disp: , Rfl:     lisinopril (PRINIVIL;ZESTRIL) 20 MG tablet, Take 20 mg by mouth 2 times daily, Disp: , Rfl:     Multiple Vitamins-Minerals (CENTRUM PO), Take 1 tablet by mouth daily, Disp: , Rfl:   No Known Allergies  Social History     Socioeconomic History    Marital status: Single     Spouse name: Not on file    Number of children: Not on file    Years of education: Not on file    Highest education level: Not on file   Occupational History    Not on file   Tobacco Use    Smoking status: Former    Smokeless tobacco: Never   Vaping Use    Vaping Use: Never used   Substance and Sexual Activity    Alcohol use: Yes     Comment: social    Drug use: No    Sexual activity: Not Currently     Partners: Male   Other Topics Concern    Not on file   Social History Narrative    Not on file     Social Determinants of Health     Financial Resource Strain: Not on file   Food Insecurity: Not on file   Transportation Needs: Not on file   Physical Activity: Not on file   Stress: Not on file   Social Connections: Not on file   Intimate Partner Violence: Not on file   Housing Stability: Not on file     Family History   Problem Relation Age of Onset    Heart Failure Mother     Alzheimer's Disease Father     Anemia Maternal Grandmother     Diabetes Paternal Grandmother            Physical Exam:    Temp 80 °F (36.7 °C)   Ht 5' 1\" (1.549 m)   Wt 248 lb (112.5 kg)   BMI 46.86 kg/m²     GENERAL: alert, appears stated age, cooperative, no acute distress    HEENT: Head is normocephalic, atraumatic. PERRLA. SKIN: Clean, dry, intact. There is not any cellulitis or cutaneous lesions noted in the lower extremities except noted below in MSK    PULMONARY: breathing is regular and unlabored, no acute distress    CV:  The bilateral upper and lower extremities are warm and well-perfused with brisk capillary refill. 2+ pulses UE and LE bilateral.     ABDOMINAL: Non-tender, non-distended    PSYCHIATRY: Pleasant mood, appropriate behavior, follows commands    NEURO: Sensation is intact distally with light touch with no alteration. Motor exam of the lower extremities show quadriceps, hamstrings, foot dorsiflexion and plantarflexion grossly intact 5/5. LYMPH: No lymphedema present distally in upper or lower extremity. MUSCULOSKELETAL:    Left knee Exam:    mild effusion noted. No erythema/induration/fluctuance. Posterior medial joint line TTP. Stable to varus and valgus at 0 and 30 degrees of flexion. Negative Lachman's and posterior drawer. Negative patellar grind test and J sign. Compartments soft and compressible throughout leg. Active range of motion 0-120 with pain. Positive Flora's positive Apley's, gait is antalgic        Imaging:  XR KNEE LEFT (3 VIEWS)    Result Date: 10/14/2022  EXAMINATION: 4 XRAY VIEWS OF THE LEFT TIBIA AND FIBULA; THREE XRAY VIEWS OF THE LEFT KNEE 10/14/2022 10:14 am COMPARISON: 05/10/2021 HISTORY: ORDERING SYSTEM PROVIDED HISTORY: r/o fx TECHNOLOGIST PROVIDED HISTORY: Reason for exam:->r/o fx FINDINGS: There is subchondral sclerosis and severe compartmental narrowing medially and laterally about the knee joint. There is a fracture of the medial tibial plateau which may be chronic. Ankle mortise is intact but there are small anterior posterior spurring at the tibiotalar articulation. There are degenerative changes at the patellofemoral articulation particularly laterally. There is a small joint effusion. Significant degenerative changes at the knee joint. There appears to be a chronic fracture of the medial tibial plateau.   It was suggested on the study 05/10/2021 but appears to be somewhat more displaced     XR TIBIA FIBULA LEFT (2 VIEWS)    Result Date: 10/14/2022  EXAMINATION: 4 XRAY VIEWS OF THE LEFT TIBIA AND FIBULA; THREE XRAY VIEWS OF THE LEFT KNEE 10/14/2022 10:14 am COMPARISON: 05/10/2021 HISTORY: ORDERING SYSTEM PROVIDED HISTORY: r/o fx TECHNOLOGIST PROVIDED HISTORY: Reason for exam:->r/o fx FINDINGS: There is subchondral sclerosis and severe compartmental narrowing medially and laterally about the knee joint. There is a fracture of the medial tibial plateau which may be chronic. Ankle mortise is intact but there are small anterior posterior spurring at the tibiotalar articulation. There are degenerative changes at the patellofemoral articulation particularly laterally. There is a small joint effusion. Significant degenerative changes at the knee joint. There appears to be a chronic fracture of the medial tibial plateau. It was suggested on the study 05/10/2021 but appears to be somewhat more displaced     CT KNEE LEFT WO CONTRAST    Result Date: 10/14/2022  EXAMINATION: CT OF THE LEFT KNEE WITHOUT CONTRAST 10/14/2022 1:58 pm TECHNIQUE: CT of the left knee was performed without the administration of intravenous contrast.  Multiplanar reformatted images are provided for review. Automated exposure control, iterative reconstruction, and/or weight based adjustment of the mA/kV was utilized to reduce the radiation dose to as low as reasonably achievable. COMPARISON: None. HISTORY ORDERING SYSTEM PROVIDED HISTORY: tibial plateau TECHNOLOGIST PROVIDED HISTORY: Reason for exam:->tibial plateau Decision Support Exception - unselect if not a suspected or confirmed emergency medical condition->Emergency Medical Condition (MA) FINDINGS: Bones: There is a vertically oriented fracture involving the central to lateral aspect of the medial tibial plateau. There is a small vertically oriented fracture involving the lateral tibial plateau peripherally. Severe osteoarthritis. Small knee joint effusion.  Because there fractures involving the medial and lateral tibial plateau, the findings technically represent a Schatzker classification 5 fracture, the lateral tibial plateau fracture is small and peripheral. Soft Tissue: No abnormal mass or fluid collection. Joint: Severe osteoarthritis. Patella appears to be in normal position. Moderate to severe osteophytosis of all 3 knee joint compartments. 1.  Fractures of medial and lateral tibial plateau 2. Small knee joint effusion and severe tricompartmental osteoarthritis. XR KNEE LEFT (1-2 VIEWS)    Result Date: 10/26/2022  EXAMINATION: TWO XRAY VIEWS OF THE LEFT KNEE 10/25/2022 2:09 pm COMPARISON: 14 October 2022 HISTORY: ORDERING SYSTEM PROVIDED HISTORY: Primary osteoarthritis of left knee FINDINGS: There is again noted fragmentation at the medial tibial plateau with increasing deformity. A lateral tibial plateau fracture is no longer clearly evident. Degenerative arthropathy appears somewhat progressive at the medial weight-bearing compartment and is relatively stable elsewhere. Unremarkable soft tissues. Increasing deformity associated with medial tibial plateau fracture. No clear redemonstration of lateral tibial plateau fracture. Pronounced tricompartmental osteoarthritis. Caridad Kapadia was seen today for knee pain. Diagnoses and all orders for this visit:    Primary osteoarthritis of left knee    Insufficiency fracture    Closed fracture of left tibial plateau, initial encounter      Patient seen and examined. Imaging and CT reviewed with patient in detail. Natural history and course discussed with patient in long discussion  Treatment options discussed with patient in detail including risks and benefits. Patient should do well with conservative management as patient would like to avoid surgery at this time. Patient will continue nonweightbearing but will be provided an adjustable numbers at this time. Continue with rehab and physical therapy at skilled nursing facility for range of motion exercises, ADLs, and pain control.   Follow-up in 2 to 4 weeks for recheck and new x-rays      In a 15 minute assessment and discussion, patient was provided and fitted for a removable knee brace distributed and applied as it is medically necessary at this time. She was educated in detail how to use brace and the importance of use as well as range of motion and HEP exercises. Meliton Simon DO  10/25/22      25 minutes was spent with patient. 50% or greater was spent counseling the patient.

## 2022-10-25 NOTE — PATIENT INSTRUCTIONS
Knee Immoblizer one unless hygeine is being done.   Toe Touch weight bearing  F/U 2 weeks with new xrays

## 2022-11-01 DIAGNOSIS — S82.142A CLOSED FRACTURE OF LEFT TIBIAL PLATEAU, INITIAL ENCOUNTER: Primary | ICD-10-CM

## 2022-11-07 DIAGNOSIS — S82.142A CLOSED FRACTURE OF LEFT TIBIAL PLATEAU, INITIAL ENCOUNTER: Primary | ICD-10-CM

## 2022-11-08 ENCOUNTER — OFFICE VISIT (OUTPATIENT)
Dept: ORTHOPEDIC SURGERY | Age: 72
End: 2022-11-08
Payer: MEDICARE

## 2022-11-08 VITALS — HEIGHT: 61 IN | TEMPERATURE: 98 F | WEIGHT: 249 LBS | BODY MASS INDEX: 47.01 KG/M2

## 2022-11-08 DIAGNOSIS — M17.12 PRIMARY OSTEOARTHRITIS OF LEFT KNEE: ICD-10-CM

## 2022-11-08 DIAGNOSIS — S82.142A CLOSED FRACTURE OF LEFT TIBIAL PLATEAU, INITIAL ENCOUNTER: Primary | ICD-10-CM

## 2022-11-08 DIAGNOSIS — M84.40XA INSUFFICIENCY FRACTURE: ICD-10-CM

## 2022-11-08 PROCEDURE — 99214 OFFICE O/P EST MOD 30 MIN: CPT | Performed by: ORTHOPAEDIC SURGERY

## 2022-11-08 PROCEDURE — 1111F DSCHRG MED/CURRENT MED MERGE: CPT | Performed by: ORTHOPAEDIC SURGERY

## 2022-11-08 PROCEDURE — 1090F PRES/ABSN URINE INCON ASSESS: CPT | Performed by: ORTHOPAEDIC SURGERY

## 2022-11-08 PROCEDURE — 3017F COLORECTAL CA SCREEN DOC REV: CPT | Performed by: ORTHOPAEDIC SURGERY

## 2022-11-08 PROCEDURE — 1123F ACP DISCUSS/DSCN MKR DOCD: CPT | Performed by: ORTHOPAEDIC SURGERY

## 2022-11-08 PROCEDURE — G8484 FLU IMMUNIZE NO ADMIN: HCPCS | Performed by: ORTHOPAEDIC SURGERY

## 2022-11-08 PROCEDURE — G8427 DOCREV CUR MEDS BY ELIG CLIN: HCPCS | Performed by: ORTHOPAEDIC SURGERY

## 2022-11-08 PROCEDURE — 1036F TOBACCO NON-USER: CPT | Performed by: ORTHOPAEDIC SURGERY

## 2022-11-08 PROCEDURE — G8417 CALC BMI ABV UP PARAM F/U: HCPCS | Performed by: ORTHOPAEDIC SURGERY

## 2022-11-08 PROCEDURE — G8400 PT W/DXA NO RESULTS DOC: HCPCS | Performed by: ORTHOPAEDIC SURGERY

## 2022-11-08 NOTE — PROGRESS NOTES
Chief Complaint   Patient presents with    Knee Pain     Left Knee Tibial Plateau FX F/U DOI 55/29/0545, TROM brace has been ordered but not put on patient yet, due to being at a facility. HPI:    Patient is 74 y.o. female complaining chronic, atraumatic, insidious onset left knee pain for several weeks and was found to have a tibial plateau insuffiencey fracture. She admits to stiffness, deep, aching pain, swelling, difficulty with stairs and ambulating far distances. She admits to gross instability specifically in her Left knee. Previous treatments include rest, ice, and anti-inflammatory medication and HEP without much relief. She is currently in SNF and using a knee immobilizer. On follow-up today, patient is doing somewhat better overall. She is maintain the immobilizer since she is still having issues obtaining functional adjustable brace from nursing home. However she has been doing well with physical therapy with toe-touch weightbearing. ROS:    Skin: (-) rash,(-) psoriasis,(-) eczema, (-)skin cancer. Neurologic: (-)numbness, (-)tingling, (-)headaches, (-) LOC. Cardiovascular: (-) Chest pain, (-) swelling in legs/feet, (-) SOB, (-) cramping in legs/feet with walking.     All other review of systems negative except stated above or in HPI      Past Medical History:   Diagnosis Date    Heart murmur     Hypertension     Lymphedema     Metabolic syndrome     Osteoarthritis     Osteopenia     Pinched nerve      Past Surgical History:   Procedure Laterality Date    ABDOMEN SURGERY  1984    VEIN SURGERY         Current Outpatient Medications:     aspirin EC 81 MG EC tablet, Take 1 tablet by mouth in the morning and at bedtime, Disp: 30 tablet, Rfl: 0    b complex vitamins capsule, Take 1 capsule by mouth Daily with supper, Disp: , Rfl:     vitamin B-12 (CYANOCOBALAMIN) 1000 MCG tablet, Take 1,000 mcg by mouth daily, Disp: , Rfl:     calcium carbonate 1500 (600 Ca) MG TABS tablet, Take 600 mg by mouth daily, Disp: , Rfl:     Omega-3 Fatty Acids (FISH OIL) 1000 MG capsule, Take 2,000 mg by mouth 2 times daily, Disp: , Rfl:     Cholecalciferol (VITAMIN D) 50 MCG (2000 UT) CAPS capsule, Take 2,000 Units by mouth in the morning and at bedtime, Disp: , Rfl:     Garlic 6352 MG CAPS, Take 3,000 mg by mouth in the morning and at bedtime, Disp: , Rfl:     cod liver oil CAPS, Take 2 capsules by mouth in the morning and at bedtime, Disp: , Rfl:     lisinopril (PRINIVIL;ZESTRIL) 20 MG tablet, Take 20 mg by mouth 2 times daily, Disp: , Rfl:     Multiple Vitamins-Minerals (CENTRUM PO), Take 1 tablet by mouth daily, Disp: , Rfl:   No Known Allergies  Social History     Socioeconomic History    Marital status: Single     Spouse name: Not on file    Number of children: Not on file    Years of education: Not on file    Highest education level: Not on file   Occupational History    Not on file   Tobacco Use    Smoking status: Former    Smokeless tobacco: Never   Vaping Use    Vaping Use: Never used   Substance and Sexual Activity    Alcohol use: Yes     Comment: social    Drug use: No    Sexual activity: Not Currently     Partners: Male   Other Topics Concern    Not on file   Social History Narrative    Not on file     Social Determinants of Health     Financial Resource Strain: Not on file   Food Insecurity: Not on file   Transportation Needs: Not on file   Physical Activity: Not on file   Stress: Not on file   Social Connections: Not on file   Intimate Partner Violence: Not on file   Housing Stability: Not on file     Family History   Problem Relation Age of Onset    Heart Failure Mother     Alzheimer's Disease Father     Anemia Maternal Grandmother     Diabetes Paternal Grandmother            Physical Exam:    Temp 98 °F (36.7 °C)   Ht 5' 1\" (1.549 m)   Wt 249 lb (112.9 kg)   BMI 47.05 kg/m²     GENERAL: alert, appears stated age, cooperative, no acute distress    HEENT: Head is normocephalic, atraumatic. PERRLA. SKIN: Clean, dry, intact. There is not any cellulitis or cutaneous lesions noted in the lower extremities     PULMONARY: breathing is regular and unlabored, no acute distress     CV: The bilateral lower extremities are warm and well-perfused with brisk capillary refill. 2+ pulses LE bilateral.     ABDOMINAL: Nontender, nondistended     PSYCHIATRY: Pleasant mood, appropriate behavior, follows commands     NEURO: Sensation is intact distally with light touch with no alteration. Motor exam of the lower extremities show quadriceps, hamstrings, foot dorsiflexion and plantarflexion grossly intact 5/5. LYMPH: No lymphedema present distally in upper or lower extremity. MUSCULOSKELETAL:    Left knee Exam:    mild effusion noted. No erythema/induration/fluctuance. Posterior medial joint line TTP. Stable to varus and valgus at 0 and 30 degrees of flexion. Negative Lachman's and posterior drawer. Negative patellar grind test and J sign. Compartments soft and compressible throughout leg. Active range of motion 0-120 with pain. Mild improvement since last visit    Imaging:  XR KNEE LEFT (3 VIEWS)    Result Date: 10/14/2022  EXAMINATION: 4 XRAY VIEWS OF THE LEFT TIBIA AND FIBULA; THREE XRAY VIEWS OF THE LEFT KNEE 10/14/2022 10:14 am COMPARISON: 05/10/2021 HISTORY: ORDERING SYSTEM PROVIDED HISTORY: r/o fx TECHNOLOGIST PROVIDED HISTORY: Reason for exam:->r/o fx FINDINGS: There is subchondral sclerosis and severe compartmental narrowing medially and laterally about the knee joint. There is a fracture of the medial tibial plateau which may be chronic. Ankle mortise is intact but there are small anterior posterior spurring at the tibiotalar articulation. There are degenerative changes at the patellofemoral articulation particularly laterally. There is a small joint effusion. Significant degenerative changes at the knee joint. There appears to be a chronic fracture of the medial tibial plateau.   It was suggested on the study 05/10/2021 but appears to be somewhat more displaced     XR TIBIA FIBULA LEFT (2 VIEWS)    Result Date: 10/14/2022  EXAMINATION: 4 XRAY VIEWS OF THE LEFT TIBIA AND FIBULA; THREE XRAY VIEWS OF THE LEFT KNEE 10/14/2022 10:14 am COMPARISON: 05/10/2021 HISTORY: ORDERING SYSTEM PROVIDED HISTORY: r/o fx TECHNOLOGIST PROVIDED HISTORY: Reason for exam:->r/o fx FINDINGS: There is subchondral sclerosis and severe compartmental narrowing medially and laterally about the knee joint. There is a fracture of the medial tibial plateau which may be chronic. Ankle mortise is intact but there are small anterior posterior spurring at the tibiotalar articulation. There are degenerative changes at the patellofemoral articulation particularly laterally. There is a small joint effusion. Significant degenerative changes at the knee joint. There appears to be a chronic fracture of the medial tibial plateau. It was suggested on the study 05/10/2021 but appears to be somewhat more displaced     CT KNEE LEFT WO CONTRAST    Result Date: 10/14/2022  EXAMINATION: CT OF THE LEFT KNEE WITHOUT CONTRAST 10/14/2022 1:58 pm TECHNIQUE: CT of the left knee was performed without the administration of intravenous contrast.  Multiplanar reformatted images are provided for review. Automated exposure control, iterative reconstruction, and/or weight based adjustment of the mA/kV was utilized to reduce the radiation dose to as low as reasonably achievable. COMPARISON: None. HISTORY ORDERING SYSTEM PROVIDED HISTORY: tibial plateau TECHNOLOGIST PROVIDED HISTORY: Reason for exam:->tibial plateau Decision Support Exception - unselect if not a suspected or confirmed emergency medical condition->Emergency Medical Condition (MA) FINDINGS: Bones: There is a vertically oriented fracture involving the central to lateral aspect of the medial tibial plateau.   There is a small vertically oriented fracture involving the lateral tibial plateau peripherally. Severe osteoarthritis. Small knee joint effusion. Because there fractures involving the medial and lateral tibial plateau, the findings technically represent a Schatzker classification 5 fracture, the lateral tibial plateau fracture is small and peripheral. Soft Tissue: No abnormal mass or fluid collection. Joint: Severe osteoarthritis. Patella appears to be in normal position. Moderate to severe osteophytosis of all 3 knee joint compartments. 1.  Fractures of medial and lateral tibial plateau 2. Small knee joint effusion and severe tricompartmental osteoarthritis. XR KNEE LEFT (1-2 VIEWS)    Result Date: 10/26/2022  EXAMINATION: TWO XRAY VIEWS OF THE LEFT KNEE 10/25/2022 2:09 pm COMPARISON: 14 October 2022 HISTORY: ORDERING SYSTEM PROVIDED HISTORY: Primary osteoarthritis of left knee FINDINGS: There is again noted fragmentation at the medial tibial plateau with increasing deformity. A lateral tibial plateau fracture is no longer clearly evident. Degenerative arthropathy appears somewhat progressive at the medial weight-bearing compartment and is relatively stable elsewhere. Unremarkable soft tissues. Increasing deformity associated with medial tibial plateau fracture. No clear redemonstration of lateral tibial plateau fracture. Pronounced tricompartmental osteoarthritis. XR KNEE LEFT (1-2 VIEWS)    Result Date: 11/8/2022  EXAMINATION: TWO XRAY VIEWS OF THE LEFT KNEE 11/8/2022 2:21 pm COMPARISON: X-ray dated 10/25/2022 HISTORY: ORDERING SYSTEM PROVIDED HISTORY: Closed fracture of left tibial plateau, initial encounter FINDINGS: Similar alignment of proximal medial tibial plateau fracture and irregularity of chronic deformity with background moderate to severe DJD of tricompartmental involvement and joint space narrowing. No progressive malalignment.   Nondisplaced proximal fibular fracture similar to prior     No significant change in alignment from 10/25/2022 comparison of the medial tibial plateau fracture and chronic deformity along with intermixed DJD. Stable nondisplaced fracture of the proximal fibula. Jovan Reed was seen today for knee pain. Diagnoses and all orders for this visit:    Closed fracture of left tibial plateau, initial encounter    Primary osteoarthritis of left knee    Insufficiency fracture        Patient seen and examined. Imaging and CT reviewed with patient in detail. Natural history and course discussed with patient in long discussion  Treatment options discussed with patient in detail including risks and benefits. Patient should do well with conservative management as patient would like to avoid surgery at this time. Patient will continue nonweightbearing but will be provided an adjustable numbers at this time. Continue with rehab and physical therapy at skilled nursing facility for range of motion exercises, ADLs, and pain control. Follow-up in 2 to 4 weeks for recheck and new x-rays. Madiha Kang,         25 minutes was spent with patient. 50% or greater was spent counseling the patient.

## 2022-11-08 NOTE — PATIENT INSTRUCTIONS
Toe touch weight bearing as tolerated with assistance   Daily PT   Apply Brace when obtained and kept locked at 0 degrees

## 2022-11-17 DIAGNOSIS — S82.142A CLOSED FRACTURE OF LEFT TIBIAL PLATEAU, INITIAL ENCOUNTER: Primary | ICD-10-CM

## 2022-11-21 NOTE — PROGRESS NOTES
Department of Orthopedic Surgery  Progress Note     Patient seen and examined. Pain well controlled. No new complaints. Denies chest pain, shortness of breath, calf pain, dizziness/lightheadedness. VITALS:  BP (!) 166/62   Pulse 66   Temp 97.9 °F (36.6 °C) (Oral)   Resp 17   Ht 5' 1\" (1.549 m)   Wt 248 lb (112.5 kg)   SpO2 97%   BMI 46.86 kg/m²      GENERAL: alert and oriented  MUSCULOSKELETAL:   left lower extremity:  Dressing and knee immobilizer C/D/I  Compartments soft and compressible, calf non-tender  Palpable dorsalis pedis and posterior tibialis pulse, brisk cap refill to toes, foot warm and perfused  Sensation intact to light touch in sural/deep peroneal/superficial peroneal/saphenous/posterior tibial nerve distributions to foot/ankle. Demonstrates active ankle plantar/dorsiflexion/great toe extension     CBC:         Lab Results   Component Value Date/Time     WBC 6.4 10/17/2022 04:56 AM     HGB 13.1 10/17/2022 04:56 AM     HCT 40.0 10/17/2022 04:56 AM      10/17/2022 04:56 AM         ASSESSMENT  L medial tibial plateau osteophyte fracture     PLAN    Patient seen and examined. X-rays reviewed. Natural history and course discussed with patient. Treatment options discussed with patient in detail including risks and benefits. Patient should do well with conservative management at this time. NWB LLE  Maintain knee immobilizer  Continue PT/OT  Okay to follow up outpatient.

## 2022-11-21 NOTE — PROGRESS NOTES
Department of Orthopedic Surgery  Progress Note     Subjective   patient seen and examined. Pain well controlled. No new complaints. Denies chest pain, shortness of breath, calf pain, dizziness/lightheadedness. Objective  VITALS:  BP (!) 166/62   Pulse 66   Temp 97.9 °F (36.6 °C) (Oral)   Resp 17   Ht 5' 1\" (1.549 m)   Wt 248 lb (112.5 kg)   SpO2 97%   BMI 46.86 kg/m²      GENERAL: alert and oriented    HEENT: Head is normocephalic, atraumatic. PERRLA. SKIN: Clean, dry, intact. There is not any cellulitis or cutaneous lesions noted in the lower extremities     PULMONARY: breathing is regular and unlabored, no acute distress     CV: The bilateral lower extremities are warm and well-perfused with brisk capillary refill. 2+ pulses LE bilateral.     ABDOMINAL: Nontender, nondistended     PSYCHIATRY: Pleasant mood, appropriate behavior, follows commands     NEURO: Sensation is intact distally with light touch with no alteration. Motor exam of the lower extremities show quadriceps, hamstrings, foot dorsiflexion and plantarflexion grossly intact 5/5. LYMPH: No lymphedema present distally in upper or lower extremity. MUSCULOSKELETAL:   left lower extremity:  Dressing and knee immobilizer C/D/I  Compartments soft and compressible, calf non-tender  Palpable dorsalis pedis and posterior tibialis pulse, brisk cap refill to toes, foot warm and perfused  Sensation intact to light touch in sural/deep peroneal/superficial peroneal/saphenous/posterior tibial nerve distributions to foot/ankle. Demonstrates active ankle plantar/dorsiflexion/great toe extension     CBC:         Lab Results   Component Value Date/Time     WBC 6.4 10/17/2022 04:56 AM     HGB 13.1 10/17/2022 04:56 AM     HCT 40.0 10/17/2022 04:56 AM      10/17/2022 04:56 AM         ASSESSMENT  L medial tibial plateau osteophyte fracture     PLAN    Patient seen and examined. X-rays reviewed.   Natural history and course discussed with patient. Treatment options discussed with patient in detail including risks and benefits. Patient should do well with conservative management at this time. NWB LLE  Maintain knee immobilizer  Continue PT/OT  Okay to follow up outpatient.

## 2022-11-21 NOTE — PROGRESS NOTES
Department of Orthopedic Surgery  Progress Note     Subjective   patient seen and examined. Pain well controlled. No new complaints. Denies chest pain, shortness of breath, calf pain, dizziness/lightheadedness. Objective  VITALS:  BP (!) 166/62   Pulse 66   Temp 97.9 °F (36.6 °C) (Oral)   Resp 17   Ht 5' 1\" (1.549 m)   Wt 248 lb (112.5 kg)   SpO2 97%   BMI 46.86 kg/m²      GENERAL: alert and oriented    HEENT: Head is normocephalic, atraumatic. PERRLA. SKIN: Clean, dry, intact. There is not any cellulitis or cutaneous lesions noted in the lower extremities     PULMONARY: breathing is regular and unlabored, no acute distress     CV: The bilateral lower extremities are warm and well-perfused with brisk capillary refill. 2+ pulses LE bilateral.     ABDOMINAL: Nontender, nondistended     PSYCHIATRY: Pleasant mood, appropriate behavior, follows commands     NEURO: Sensation is intact distally with light touch with no alteration. Motor exam of the lower extremities show quadriceps, hamstrings, foot dorsiflexion and plantarflexion grossly intact 5/5. LYMPH: No lymphedema present distally in upper or lower extremity. MUSCULOSKELETAL:   left lower extremity:  Dressing and knee immobilizer C/D/I  Compartments soft and compressible, calf non-tender  Palpable dorsalis pedis and posterior tibialis pulse, brisk cap refill to toes, foot warm and perfused  Sensation intact to light touch in sural/deep peroneal/superficial peroneal/saphenous/posterior tibial nerve distributions to foot/ankle. Demonstrates active ankle plantar/dorsiflexion/great toe extension     CBC:         Lab Results   Component Value Date/Time     WBC 6.4 10/17/2022 04:56 AM     HGB 13.1 10/17/2022 04:56 AM     HCT 40.0 10/17/2022 04:56 AM      10/17/2022 04:56 AM         ASSESSMENT  L medial tibial plateau osteophyte fracture     PLAN    Patient seen and examined. X-rays reviewed.   Natural history and course discussed with patient. Treatment options discussed with patient in detail including risks and benefits. Patient should do well with conservative management at this time. NWB LLE  Maintain knee immobilizer  Continue PT/OT  Okay to follow up outpatient. No planned orthopedic intervention at this time.

## 2022-11-21 NOTE — CONSULTS
Department of Orthopedic Surgery  Consult Note     Reason for Consult: Left knee pain     HISTORY OF PRESENT ILLNESS:                   Patient is a 74 y.o. female who presents with pain in her left knee. Patient has been followed by Dr. Christian Jones for tricompartmental severe osteoarthritis of her knees bilaterally and recently received a cortisone shot 2 months ago. She reports appropriate level of pain with conservative management. Patient reports that last Sunday she started feeling her knees becoming more unstable. Yesterday she heard a pop after attempting to stand up from a sitting chair position. She reports immediate pain with weightbearing, forcing her to remain in supine position. Patient states that the pain has become unbearable today triggering her to call the ambulance which brought her to [facility]. She is currently in bed pleasant and denies any ongoing pain. She also denies any trauma. Her pain is only present with ambulation and weightbearing. Her pain is mainly located in the outside of her knee. Patient denies shortness of breath, chest pain. Patient has no other acute complaint. Past Medical History:    Past Medical History             Diagnosis Date    Heart murmur      Hypertension      Lymphedema      Metabolic syndrome      Osteoarthritis      Osteopenia      Pinched nerve           Past Surgical History:    Past Surgical History             Procedure Laterality Date    ABDOMEN SURGERY   1984    VEIN SURGERY             Current Medications:     Current Hospital Medications   Current Facility-Administered Medications: lisinopril (PRINIVIL;ZESTRIL) tablet 20 mg, 20 mg, Oral, BID  enoxaparin Sodium (LOVENOX) injection 30 mg, 30 mg, SubCUTAneous, Daily     Allergies:  Patient has no known allergies. Social History:   TOBACCO:   reports that she has quit smoking. She has never used smokeless tobacco.  ETOH:   reports current alcohol use.   DRUGS:   reports no history of drug use.  ACTIVITIES OF DAILY LIVING:    OCCUPATION:    Family History:   Family History             Problem Relation Age of Onset    Heart Failure Mother      Alzheimer's Disease Father      Anemia Maternal Grandmother      Diabetes Paternal Grandmother              REVIEW OF SYSTEMS:  CONSTITUTIONAL:  negative for  fevers, chills  EYES:  negative for blurred vision, visual disturbance  HEENT:  negative for  hearing loss, voice change  RESPIRATORY:  negative for  dyspnea, wheezing  CARDIOVASCULAR:  negative for  chest pain, palpitations  GASTROINTESTINAL:  negative for nausea, vomiting  GENITOURINARY:  negative for frequency, urinary incontinence  HEMATOLOGIC/LYMPHATIC:  negative for bleeding and petechiae  MUSCULOSKELETAL:  positive for  pain, joint swelling, and stiff joints  NEUROLOGICAL:  negative for headaches, dizziness  BEHAVIOR/PSYCH:  negative for increased agitation and anxiety     PHYSICAL EXAM:    VITALS:  BP (!) 160/68 Comment: nurse notified  Pulse 72   Temp 97.5 °F (36.4 °C) (Oral)   Resp 18   Ht 5' 1\" (1.549 m)   Wt 248 lb (112.5 kg)   SpO2 100%   BMI 46.86 kg/m²   CONSTITUTIONAL:  awake, alert, cooperative, no apparent distress, and appears stated age    HEENT: Head is normocephalic, atraumatic. PERRLA. SKIN: Clean, dry, intact. There is not any cellulitis or cutaneous lesions noted in the lower extremities     PULMONARY: breathing is regular and unlabored, no acute distress     CV: The bilateral lower extremities are warm and well-perfused with brisk capillary refill. 2+ pulses LE bilateral.     ABDOMINAL: Nontender, nondistended     PSYCHIATRY: Pleasant mood, appropriate behavior, follows commands     NEURO: Sensation is intact distally with light touch with no alteration. Motor exam of the lower extremities show quadriceps, hamstrings, foot dorsiflexion and plantarflexion grossly intact 5/5. LYMPH: No lymphedema present distally in upper or lower extremity. MUSCULOSKELETAL:  Left lower Extremity:  Pain with deep palpation over lateral and medial knee joint  No signs of trauma  Significant edema noted   Patient able to flex/extend toes, ankle/knee and hip with active and passive range of motion with minimal pain  2+ DP/PT pulses, capillary refill less than 3 seconds  No sensation over medial midfoot  Pain with valgus/varus stress of the knee        Secondary Exam:   bilateralUE: No obvious signs of trauma. -TTP to fingers, hand, wrist, forearm, elbow, humerus, shoulder or clavicle. -- Patient able to flex/extend fingers, wrist, elbow and shoulder with active and passive ROM without pain, +2/4 Radial pulse, cap refill <3sec, +AIN/PIN/Radial/Ulnar/Median N, distal sensation grossly intact to C4-T1 dermatomes, compartments soft and compressible. rightLE: No obvious signs of trauma. Significant swelling noted. -TTP to foot, ankle, leg, knee, thigh, hip.-- Patient able to flex/extend toes, ankle, knee and hip with active and passive ROM without pain,+2/4 DP & PT pulses, cap refill <3sec, +5/5 PF/DF/EHL, distal sensation grossly intact to L4-S1 dermatomes, compartments soft and compressible. Pelvis: -TTP, -Log roll, -Heel strike      DATA:    CBC:         Lab Results   Component Value Date/Time     WBC 7.2 10/14/2022 12:36 PM     RBC 4.51 10/14/2022 12:36 PM     HGB 14.1 10/14/2022 12:36 PM     HCT 41.4 10/14/2022 12:36 PM     MCV 91.8 10/14/2022 12:36 PM     MCH 31.3 10/14/2022 12:36 PM     MCHC 34.1 10/14/2022 12:36 PM     RDW 13.3 10/14/2022 12:36 PM      10/14/2022 12:36 PM     MPV 8.8 10/14/2022 12:36 PM      PT/INR:  No results found for: PROTIME, INR     Radiology Review:     X-ray left knee demonstrate severe left knee joint degenerative disease with diffuse osteophyte formation, with significant tibiofemoral joint space narrowing. Appears to be a medial tibial plateau fracture, but not obvious. Poor bone quality noted throughout.   Varus deformity noted     X-ray right tibia fibula demonstrate no acute fracture or dislocation. Poor bone quality noted throughout. Severe osteoarthritis noted up in the knee joint     CT right knee demonstrate severe degenerative joint disease with joint space narrowing and osteophyte splitting by the medial tibial plateau. IMPRESSION:  Acute on chronic left knee osteoarthritis exacerbation     PLAN:  Patient seen and examined. Imaging reviewed with patient in detail. Natural history and course discussed with patient in long discussion  Treatment options discussed with patient in detail including risks and benefits. Patient should do well with conservative management as patient would like to avoid surgery at this time. Nonweightbearing left lower extremity  Patient was placed in a knee immobilizer  Pain medication per primary team  Anticoagulants DVT prophylaxis  No acute orthopedic intervention at this time  Follow-up in clinic with 1 week after obtaining knee brace.

## 2022-11-22 ENCOUNTER — OFFICE VISIT (OUTPATIENT)
Dept: ORTHOPEDIC SURGERY | Age: 72
End: 2022-11-22
Payer: MEDICARE

## 2022-11-22 VITALS — HEIGHT: 61 IN | BODY MASS INDEX: 47.05 KG/M2

## 2022-11-22 DIAGNOSIS — S82.142A CLOSED FRACTURE OF LEFT TIBIAL PLATEAU, INITIAL ENCOUNTER: Primary | ICD-10-CM

## 2022-11-22 PROCEDURE — 99213 OFFICE O/P EST LOW 20 MIN: CPT | Performed by: ORTHOPAEDIC SURGERY

## 2022-11-22 PROCEDURE — G8484 FLU IMMUNIZE NO ADMIN: HCPCS | Performed by: ORTHOPAEDIC SURGERY

## 2022-11-22 PROCEDURE — 3017F COLORECTAL CA SCREEN DOC REV: CPT | Performed by: ORTHOPAEDIC SURGERY

## 2022-11-22 PROCEDURE — G8427 DOCREV CUR MEDS BY ELIG CLIN: HCPCS | Performed by: ORTHOPAEDIC SURGERY

## 2022-11-22 PROCEDURE — 1090F PRES/ABSN URINE INCON ASSESS: CPT | Performed by: ORTHOPAEDIC SURGERY

## 2022-11-22 PROCEDURE — G8417 CALC BMI ABV UP PARAM F/U: HCPCS | Performed by: ORTHOPAEDIC SURGERY

## 2022-11-22 PROCEDURE — 1123F ACP DISCUSS/DSCN MKR DOCD: CPT | Performed by: ORTHOPAEDIC SURGERY

## 2022-11-22 PROCEDURE — 1036F TOBACCO NON-USER: CPT | Performed by: ORTHOPAEDIC SURGERY

## 2022-11-22 PROCEDURE — G8400 PT W/DXA NO RESULTS DOC: HCPCS | Performed by: ORTHOPAEDIC SURGERY

## 2022-11-22 NOTE — PROGRESS NOTES
Chief Complaint   Patient presents with    Fracture     Left knee tibial plateau fx, f/u w/ x-ray             HPI:    Patient is 74 y.o. female complaining chronic, atraumatic, insidious onset left knee pain for several weeks and was found to have a tibial plateau insuffiencey fracture. She admits to stiffness, deep, aching pain, swelling, difficulty with stairs and ambulating far distances. She admits to gross instability specifically in her Left knee. Previous treatments include rest, ice, and anti-inflammatory medication and HEP without much relief. She is currently in SNF and using a knee immobilizer. On follow-up today, patient is doing somewhat better overall. She is maintain the immobilizer since she is still having issues obtaining functional adjustable brace from nursing home. However she has been doing well with physical therapy with toe-touch weightbearing. ROS:    Skin: (-) rash,(-) psoriasis,(-) eczema, (-)skin cancer. Neurologic: (-)numbness, (-)tingling, (-)headaches, (-) LOC. Cardiovascular: (-) Chest pain, (-) swelling in legs/feet, (-) SOB, (-) cramping in legs/feet with walking.     All other review of systems negative except stated above or in HPI      Past Medical History:   Diagnosis Date    Heart murmur     Hypertension     Lymphedema     Metabolic syndrome     Osteoarthritis     Osteopenia     Pinched nerve      Past Surgical History:   Procedure Laterality Date    ABDOMEN SURGERY  1984    VEIN SURGERY         Current Outpatient Medications:     aspirin EC 81 MG EC tablet, Take 1 tablet by mouth in the morning and at bedtime, Disp: 30 tablet, Rfl: 0    b complex vitamins capsule, Take 1 capsule by mouth Daily with supper, Disp: , Rfl:     vitamin B-12 (CYANOCOBALAMIN) 1000 MCG tablet, Take 1,000 mcg by mouth daily, Disp: , Rfl:     calcium carbonate 1500 (600 Ca) MG TABS tablet, Take 600 mg by mouth daily, Disp: , Rfl:     Omega-3 Fatty Acids (FISH OIL) 1000 MG capsule, Take 2,000 mg by mouth 2 times daily, Disp: , Rfl:     Cholecalciferol (VITAMIN D) 50 MCG (2000 UT) CAPS capsule, Take 2,000 Units by mouth in the morning and at bedtime, Disp: , Rfl:     Garlic 9483 MG CAPS, Take 3,000 mg by mouth in the morning and at bedtime, Disp: , Rfl:     cod liver oil CAPS, Take 2 capsules by mouth in the morning and at bedtime, Disp: , Rfl:     lisinopril (PRINIVIL;ZESTRIL) 20 MG tablet, Take 20 mg by mouth 2 times daily, Disp: , Rfl:     Multiple Vitamins-Minerals (CENTRUM PO), Take 1 tablet by mouth daily, Disp: , Rfl:   No Known Allergies  Social History     Socioeconomic History    Marital status: Single     Spouse name: Not on file    Number of children: Not on file    Years of education: Not on file    Highest education level: Not on file   Occupational History    Not on file   Tobacco Use    Smoking status: Former    Smokeless tobacco: Never   Vaping Use    Vaping Use: Never used   Substance and Sexual Activity    Alcohol use: Yes     Comment: social    Drug use: No    Sexual activity: Not Currently     Partners: Male   Other Topics Concern    Not on file   Social History Narrative    Not on file     Social Determinants of Health     Financial Resource Strain: Not on file   Food Insecurity: Not on file   Transportation Needs: Not on file   Physical Activity: Not on file   Stress: Not on file   Social Connections: Not on file   Intimate Partner Violence: Not on file   Housing Stability: Not on file     Family History   Problem Relation Age of Onset    Heart Failure Mother     Alzheimer's Disease Father     Anemia Maternal Grandmother     Diabetes Paternal Grandmother            Physical Exam:    Ht 5' 1\" (1.549 m)   BMI 47.05 kg/m²     GENERAL: alert, appears stated age, cooperative, no acute distress    HEENT: Head is normocephalic, atraumatic. PERRLA. SKIN: Clean, dry, intact.  There is not any cellulitis or cutaneous lesions noted in the lower extremities     PULMONARY: breathing is regular and unlabored, no acute distress     CV: The bilateral lower extremities are warm and well-perfused with brisk capillary refill. 2+ pulses LE bilateral.     ABDOMINAL: Nontender, nondistended     PSYCHIATRY: Pleasant mood, appropriate behavior, follows commands     NEURO: Sensation is intact distally with light touch with no alteration. Motor exam of the lower extremities show quadriceps, hamstrings, foot dorsiflexion and plantarflexion grossly intact 5/5. LYMPH: No lymphedema present distally in upper or lower extremity. MUSCULOSKELETAL:    Left knee Exam:    mild effusion noted. No erythema/induration/fluctuance. Posterior medial joint line TTP. Stable to varus and valgus at 0 and 30 degrees of flexion. Negative Lachman's and posterior drawer. Negative patellar grind test and J sign. Compartments soft and compressible throughout leg. Active range of motion 0-120 with pain. Mild improvement since last visit    Imaging:  XR KNEE LEFT (3 VIEWS)    Result Date: 10/14/2022  EXAMINATION: 4 XRAY VIEWS OF THE LEFT TIBIA AND FIBULA; THREE XRAY VIEWS OF THE LEFT KNEE 10/14/2022 10:14 am COMPARISON: 05/10/2021 HISTORY: ORDERING SYSTEM PROVIDED HISTORY: r/o fx TECHNOLOGIST PROVIDED HISTORY: Reason for exam:->r/o fx FINDINGS: There is subchondral sclerosis and severe compartmental narrowing medially and laterally about the knee joint. There is a fracture of the medial tibial plateau which may be chronic. Ankle mortise is intact but there are small anterior posterior spurring at the tibiotalar articulation. There are degenerative changes at the patellofemoral articulation particularly laterally. There is a small joint effusion. Significant degenerative changes at the knee joint. There appears to be a chronic fracture of the medial tibial plateau.   It was suggested on the study 05/10/2021 but appears to be somewhat more displaced     XR TIBIA FIBULA LEFT (2 VIEWS)    Result Date: 10/14/2022  EXAMINATION: 4 XRAY VIEWS OF THE LEFT TIBIA AND FIBULA; THREE XRAY VIEWS OF THE LEFT KNEE 10/14/2022 10:14 am COMPARISON: 05/10/2021 HISTORY: ORDERING SYSTEM PROVIDED HISTORY: r/o fx TECHNOLOGIST PROVIDED HISTORY: Reason for exam:->r/o fx FINDINGS: There is subchondral sclerosis and severe compartmental narrowing medially and laterally about the knee joint. There is a fracture of the medial tibial plateau which may be chronic. Ankle mortise is intact but there are small anterior posterior spurring at the tibiotalar articulation. There are degenerative changes at the patellofemoral articulation particularly laterally. There is a small joint effusion. Significant degenerative changes at the knee joint. There appears to be a chronic fracture of the medial tibial plateau. It was suggested on the study 05/10/2021 but appears to be somewhat more displaced     CT KNEE LEFT WO CONTRAST    Result Date: 10/14/2022  EXAMINATION: CT OF THE LEFT KNEE WITHOUT CONTRAST 10/14/2022 1:58 pm TECHNIQUE: CT of the left knee was performed without the administration of intravenous contrast.  Multiplanar reformatted images are provided for review. Automated exposure control, iterative reconstruction, and/or weight based adjustment of the mA/kV was utilized to reduce the radiation dose to as low as reasonably achievable. COMPARISON: None. HISTORY ORDERING SYSTEM PROVIDED HISTORY: tibial plateau TECHNOLOGIST PROVIDED HISTORY: Reason for exam:->tibial plateau Decision Support Exception - unselect if not a suspected or confirmed emergency medical condition->Emergency Medical Condition (MA) FINDINGS: Bones: There is a vertically oriented fracture involving the central to lateral aspect of the medial tibial plateau. There is a small vertically oriented fracture involving the lateral tibial plateau peripherally. Severe osteoarthritis. Small knee joint effusion.  Because there fractures involving the medial and lateral tibial plateau, the findings technically represent a Schatzker classification 5 fracture, the lateral tibial plateau fracture is small and peripheral. Soft Tissue: No abnormal mass or fluid collection. Joint: Severe osteoarthritis. Patella appears to be in normal position. Moderate to severe osteophytosis of all 3 knee joint compartments. 1.  Fractures of medial and lateral tibial plateau 2. Small knee joint effusion and severe tricompartmental osteoarthritis. XR KNEE LEFT (1-2 VIEWS)    Result Date: 10/26/2022  EXAMINATION: TWO XRAY VIEWS OF THE LEFT KNEE 10/25/2022 2:09 pm COMPARISON: 14 October 2022 HISTORY: ORDERING SYSTEM PROVIDED HISTORY: Primary osteoarthritis of left knee FINDINGS: There is again noted fragmentation at the medial tibial plateau with increasing deformity. A lateral tibial plateau fracture is no longer clearly evident. Degenerative arthropathy appears somewhat progressive at the medial weight-bearing compartment and is relatively stable elsewhere. Unremarkable soft tissues. Increasing deformity associated with medial tibial plateau fracture. No clear redemonstration of lateral tibial plateau fracture. Pronounced tricompartmental osteoarthritis. XR KNEE LEFT (1-2 VIEWS)    Result Date: 11/8/2022  EXAMINATION: TWO XRAY VIEWS OF THE LEFT KNEE 11/8/2022 2:21 pm COMPARISON: X-ray dated 10/25/2022 HISTORY: ORDERING SYSTEM PROVIDED HISTORY: Closed fracture of left tibial plateau, initial encounter FINDINGS: Similar alignment of proximal medial tibial plateau fracture and irregularity of chronic deformity with background moderate to severe DJD of tricompartmental involvement and joint space narrowing. No progressive malalignment. Nondisplaced proximal fibular fracture similar to prior     No significant change in alignment from 10/25/2022 comparison of the medial tibial plateau fracture and chronic deformity along with intermixed DJD.  Stable nondisplaced fracture of the proximal fibula. XR KNEE LEFT (1-2 VIEWS)    Result Date: 11/22/2022  EXAMINATION: TWO XRAY VIEWS OF THE LEFT KNEE 11/22/2022 12:56 pm COMPARISON: 8 November 2022 HISTORY: ORDERING SYSTEM PROVIDED HISTORY: Closed fracture of left tibial plateau, initial encounter FINDINGS: Stable alignment of medial tibial plateau fracture with no new callus formation. Severe degenerative arthropathy at all 3 compartments. No new abnormal bone or soft tissue findings. Unchanged appearance of medial tibial plateau fracture. Severe degenerative arthropathy as before. Jay Wallace was seen today for fracture. Diagnoses and all orders for this visit:    Closed fracture of left tibial plateau, initial encounter          Patient seen and examined. Imaging and CT reviewed with patient in detail. Natural history and course discussed with patient in long discussion  Treatment options discussed with patient in detail including risks and benefits. Patient should do well with conservative management as patient would like to avoid surgery at this time. Patient will continue nonweightbearing but will be provided an adjustable numbers at this time. Continue with rehab and physical therapy at skilled nursing facility for range of motion exercises, ADLs, and pain control. Follow-up in 2 to 4 weeks for recheck and new x-rays.           Jaycee Henley,

## 2022-11-28 ENCOUNTER — OFFICE VISIT (OUTPATIENT)
Dept: ORTHOPEDIC SURGERY | Age: 72
End: 2022-11-28
Payer: MEDICARE

## 2022-11-28 DIAGNOSIS — M17.11 PRIMARY OSTEOARTHRITIS OF RIGHT KNEE: Primary | ICD-10-CM

## 2022-11-28 PROCEDURE — 20610 DRAIN/INJ JOINT/BURSA W/O US: CPT | Performed by: ORTHOPAEDIC SURGERY

## 2022-11-28 PROCEDURE — 99999 PR OFFICE/OUTPT VISIT,PROCEDURE ONLY: CPT | Performed by: ORTHOPAEDIC SURGERY

## 2022-11-29 NOTE — PROGRESS NOTES
Chief Complaint   Patient presents with    Knee Pain     Right Knee zilretta Injection        I will proceed with a cortisone injection in the Right knee. Verbal and written consent was obtained for the injections. The skin was prepped with alcohol. A prepared mixture of 32 mg of Zilretta and 5mL diluent was injected to Right knee. The injection was given through the lateral side of the knee. The patient tolerated the injection well. I will see the patient back prn. Abelardo Robert was seen today for knee pain.     Diagnoses and all orders for this visit:    Primary osteoarthritis of right knee  -     FL ARTHROCENTESIS ASPIR&/INJ MAJOR JT/BURSA W/O US    Other orders  -     triamcinolone acetonide (ZILRETTA) intra-articular injection 32 mg

## 2022-12-12 DIAGNOSIS — S82.142A CLOSED FRACTURE OF LEFT TIBIAL PLATEAU, INITIAL ENCOUNTER: Primary | ICD-10-CM

## 2022-12-13 ENCOUNTER — OFFICE VISIT (OUTPATIENT)
Dept: ORTHOPEDIC SURGERY | Age: 72
End: 2022-12-13

## 2022-12-13 VITALS — TEMPERATURE: 98 F | WEIGHT: 249 LBS | BODY MASS INDEX: 47.01 KG/M2 | HEIGHT: 61 IN

## 2022-12-13 DIAGNOSIS — S82.142A CLOSED FRACTURE OF LEFT TIBIAL PLATEAU, INITIAL ENCOUNTER: Primary | ICD-10-CM

## 2022-12-13 NOTE — PROGRESS NOTES
Chief Complaint   Patient presents with    Knee Pain     Left tibial plateau fx DOI 69/22/48. Knee is doing well. No pain right now. Still NWB but would like to be able to walk at this time. HPI:    Patient is 74 y.o. female complaining chronic, atraumatic, insidious onset left knee pain for several weeks and was found to have a tibial plateau insuffiencey fracture. She admits to stiffness, deep, aching pain, swelling, difficulty with stairs and ambulating far distances. She admits to gross instability specifically in her Left knee. Previous treatments include rest, ice, and anti-inflammatory medication and HEP without much relief. She is currently in SNF and using a knee immobilizer. On follow-up today, patient is doing somewhat better overall. She is maintain the immobilizer since she is still having issues obtaining functional adjustable brace from nursing home. However she has been doing well with physical therapy with toe-touch weightbearing. ROS:    Skin: (-) rash,(-) psoriasis,(-) eczema, (-)skin cancer. Neurologic: (-)numbness, (-)tingling, (-)headaches, (-) LOC. Cardiovascular: (-) Chest pain, (-) swelling in legs/feet, (-) SOB, (-) cramping in legs/feet with walking.     All other review of systems negative except stated above or in HPI      Past Medical History:   Diagnosis Date    Heart murmur     Hypertension     Lymphedema     Metabolic syndrome     Osteoarthritis     Osteopenia     Pinched nerve      Past Surgical History:   Procedure Laterality Date    ABDOMEN SURGERY  1984    VEIN SURGERY         Current Outpatient Medications:     aspirin EC 81 MG EC tablet, Take 1 tablet by mouth in the morning and at bedtime, Disp: 30 tablet, Rfl: 0    b complex vitamins capsule, Take 1 capsule by mouth Daily with supper, Disp: , Rfl:     vitamin B-12 (CYANOCOBALAMIN) 1000 MCG tablet, Take 1,000 mcg by mouth daily, Disp: , Rfl:     calcium carbonate 1500 (600 Ca) MG TABS tablet, Take 600 mg by mouth daily, Disp: , Rfl:     Omega-3 Fatty Acids (FISH OIL) 1000 MG capsule, Take 2,000 mg by mouth 2 times daily, Disp: , Rfl:     Cholecalciferol (VITAMIN D) 50 MCG (2000 UT) CAPS capsule, Take 2,000 Units by mouth in the morning and at bedtime, Disp: , Rfl:     Garlic 9462 MG CAPS, Take 3,000 mg by mouth in the morning and at bedtime, Disp: , Rfl:     cod liver oil CAPS, Take 2 capsules by mouth in the morning and at bedtime, Disp: , Rfl:     lisinopril (PRINIVIL;ZESTRIL) 20 MG tablet, Take 20 mg by mouth 2 times daily, Disp: , Rfl:     Multiple Vitamins-Minerals (CENTRUM PO), Take 1 tablet by mouth daily, Disp: , Rfl:   No Known Allergies  Social History     Socioeconomic History    Marital status: Single     Spouse name: Not on file    Number of children: Not on file    Years of education: Not on file    Highest education level: Not on file   Occupational History    Not on file   Tobacco Use    Smoking status: Former    Smokeless tobacco: Never   Vaping Use    Vaping Use: Never used   Substance and Sexual Activity    Alcohol use: Yes     Comment: social    Drug use: No    Sexual activity: Not Currently     Partners: Male   Other Topics Concern    Not on file   Social History Narrative    Not on file     Social Determinants of Health     Financial Resource Strain: Not on file   Food Insecurity: Not on file   Transportation Needs: Not on file   Physical Activity: Not on file   Stress: Not on file   Social Connections: Not on file   Intimate Partner Violence: Not on file   Housing Stability: Not on file     Family History   Problem Relation Age of Onset    Heart Failure Mother     Alzheimer's Disease Father     Anemia Maternal Grandmother     Diabetes Paternal Grandmother            Physical Exam:    Temp 98 °F (36.7 °C)   Ht 5' 1\" (1.549 m)   Wt 249 lb (112.9 kg)   BMI 47.05 kg/m²     GENERAL: alert, appears stated age, cooperative, no acute distress    HEENT: Head is normocephalic, atraumatic. PERRLA. SKIN: Clean, dry, intact. There is not any cellulitis or cutaneous lesions noted in the lower extremities     PULMONARY: breathing is regular and unlabored, no acute distress     CV: The bilateral lower extremities are warm and well-perfused with brisk capillary refill. 2+ pulses LE bilateral.     ABDOMINAL: Nontender, nondistended     PSYCHIATRY: Pleasant mood, appropriate behavior, follows commands     NEURO: Sensation is intact distally with light touch with no alteration. Motor exam of the lower extremities show quadriceps, hamstrings, foot dorsiflexion and plantarflexion grossly intact 5/5. LYMPH: No lymphedema present distally in upper or lower extremity. MUSCULOSKELETAL:    Left knee Exam:    mild effusion noted. No erythema/induration/fluctuance. Posterior medial joint line TTP. Stable to varus and valgus at 0 and 30 degrees of flexion. Negative Lachman's and posterior drawer. Negative patellar grind test and J sign. Compartments soft and compressible throughout leg. Active range of motion 0-120 with pain. Mild improvement since last visit    Imaging:  XR KNEE LEFT (3 VIEWS)    Result Date: 10/14/2022  EXAMINATION: 4 XRAY VIEWS OF THE LEFT TIBIA AND FIBULA; THREE XRAY VIEWS OF THE LEFT KNEE 10/14/2022 10:14 am COMPARISON: 05/10/2021 HISTORY: ORDERING SYSTEM PROVIDED HISTORY: r/o fx TECHNOLOGIST PROVIDED HISTORY: Reason for exam:->r/o fx FINDINGS: There is subchondral sclerosis and severe compartmental narrowing medially and laterally about the knee joint. There is a fracture of the medial tibial plateau which may be chronic. Ankle mortise is intact but there are small anterior posterior spurring at the tibiotalar articulation. There are degenerative changes at the patellofemoral articulation particularly laterally. There is a small joint effusion. Significant degenerative changes at the knee joint. There appears to be a chronic fracture of the medial tibial plateau.   It was suggested on the study 05/10/2021 but appears to be somewhat more displaced     XR TIBIA FIBULA LEFT (2 VIEWS)    Result Date: 10/14/2022  EXAMINATION: 4 XRAY VIEWS OF THE LEFT TIBIA AND FIBULA; THREE XRAY VIEWS OF THE LEFT KNEE 10/14/2022 10:14 am COMPARISON: 05/10/2021 HISTORY: ORDERING SYSTEM PROVIDED HISTORY: r/o fx TECHNOLOGIST PROVIDED HISTORY: Reason for exam:->r/o fx FINDINGS: There is subchondral sclerosis and severe compartmental narrowing medially and laterally about the knee joint. There is a fracture of the medial tibial plateau which may be chronic. Ankle mortise is intact but there are small anterior posterior spurring at the tibiotalar articulation. There are degenerative changes at the patellofemoral articulation particularly laterally. There is a small joint effusion. Significant degenerative changes at the knee joint. There appears to be a chronic fracture of the medial tibial plateau. It was suggested on the study 05/10/2021 but appears to be somewhat more displaced     CT KNEE LEFT WO CONTRAST    Result Date: 10/14/2022  EXAMINATION: CT OF THE LEFT KNEE WITHOUT CONTRAST 10/14/2022 1:58 pm TECHNIQUE: CT of the left knee was performed without the administration of intravenous contrast.  Multiplanar reformatted images are provided for review. Automated exposure control, iterative reconstruction, and/or weight based adjustment of the mA/kV was utilized to reduce the radiation dose to as low as reasonably achievable. COMPARISON: None. HISTORY ORDERING SYSTEM PROVIDED HISTORY: tibial plateau TECHNOLOGIST PROVIDED HISTORY: Reason for exam:->tibial plateau Decision Support Exception - unselect if not a suspected or confirmed emergency medical condition->Emergency Medical Condition (MA) FINDINGS: Bones: There is a vertically oriented fracture involving the central to lateral aspect of the medial tibial plateau.   There is a small vertically oriented fracture involving the lateral tibial plateau peripherally. Severe osteoarthritis. Small knee joint effusion. Because there fractures involving the medial and lateral tibial plateau, the findings technically represent a Schatzker classification 5 fracture, the lateral tibial plateau fracture is small and peripheral. Soft Tissue: No abnormal mass or fluid collection. Joint: Severe osteoarthritis. Patella appears to be in normal position. Moderate to severe osteophytosis of all 3 knee joint compartments. 1.  Fractures of medial and lateral tibial plateau 2. Small knee joint effusion and severe tricompartmental osteoarthritis. XR KNEE LEFT (1-2 VIEWS)    Result Date: 10/26/2022  EXAMINATION: TWO XRAY VIEWS OF THE LEFT KNEE 10/25/2022 2:09 pm COMPARISON: 14 October 2022 HISTORY: ORDERING SYSTEM PROVIDED HISTORY: Primary osteoarthritis of left knee FINDINGS: There is again noted fragmentation at the medial tibial plateau with increasing deformity. A lateral tibial plateau fracture is no longer clearly evident. Degenerative arthropathy appears somewhat progressive at the medial weight-bearing compartment and is relatively stable elsewhere. Unremarkable soft tissues. Increasing deformity associated with medial tibial plateau fracture. No clear redemonstration of lateral tibial plateau fracture. Pronounced tricompartmental osteoarthritis. XR KNEE LEFT (1-2 VIEWS)    Result Date: 11/8/2022  EXAMINATION: TWO XRAY VIEWS OF THE LEFT KNEE 11/8/2022 2:21 pm COMPARISON: X-ray dated 10/25/2022 HISTORY: ORDERING SYSTEM PROVIDED HISTORY: Closed fracture of left tibial plateau, initial encounter FINDINGS: Similar alignment of proximal medial tibial plateau fracture and irregularity of chronic deformity with background moderate to severe DJD of tricompartmental involvement and joint space narrowing. No progressive malalignment.   Nondisplaced proximal fibular fracture similar to prior     No significant change in alignment from 10/25/2022 comparison of the medial tibial plateau fracture and chronic deformity along with intermixed DJD. Stable nondisplaced fracture of the proximal fibula. XR KNEE LEFT (1-2 VIEWS)    Result Date: 11/22/2022  EXAMINATION: TWO XRAY VIEWS OF THE LEFT KNEE 11/22/2022 12:56 pm COMPARISON: 8 November 2022 HISTORY: ORDERING SYSTEM PROVIDED HISTORY: Closed fracture of left tibial plateau, initial encounter FINDINGS: Stable alignment of medial tibial plateau fracture with no new callus formation. Severe degenerative arthropathy at all 3 compartments. No new abnormal bone or soft tissue findings. Unchanged appearance of medial tibial plateau fracture. Severe degenerative arthropathy as before. XR KNEE LEFT (1-2 VIEWS)    Result Date: 12/14/2022  EXAMINATION: TWO XRAY VIEWS OF THE LEFT KNEE 12/13/2022 12:20 pm COMPARISON: 22 November 2022 HISTORY: ORDERING SYSTEM PROVIDED HISTORY: Closed fracture of left tibial plateau, initial encounter FINDINGS: Stable alignment of medial tibial plateau fracture. Very severe tricompartmental left knee osteoarthritis as before. Small knee joint effusion as before. Unchanged appearance of medial tibial plateau fracture and very severe osteoarthritis. Very small knee joint effusion as before. Fidelina Mackay was seen today for knee pain. Diagnoses and all orders for this visit:    Closed fracture of left tibial plateau, initial encounter            Patient seen and examined. Imaging and CT reviewed with patient in detail. Natural history and course discussed with patient in long discussion  Treatment options discussed with patient in detail including risks and benefits. Patient should do well with conservative management as patient would like to avoid surgery at this time. Patient will continue nonweightbearing but will be provided an adjustable knee brace at this time. She is weightbearing as tolerated.   Continue with rehab and physical therapy at [facility] for range of motion exercises, ADLs, and pain control. Follow-up in 2 to 4 weeks for recheck and new x-rays.           Kunal Goodwin, DO

## 2022-12-23 DIAGNOSIS — S82.142A CLOSED FRACTURE OF LEFT TIBIAL PLATEAU, INITIAL ENCOUNTER: Primary | ICD-10-CM

## 2022-12-27 ENCOUNTER — OFFICE VISIT (OUTPATIENT)
Dept: ORTHOPEDIC SURGERY | Age: 72
End: 2022-12-27
Payer: MEDICARE

## 2022-12-27 VITALS — BODY MASS INDEX: 42.53 KG/M2 | WEIGHT: 225.3 LBS | TEMPERATURE: 98 F | HEIGHT: 61 IN

## 2022-12-27 DIAGNOSIS — S82.142A CLOSED FRACTURE OF LEFT TIBIAL PLATEAU, INITIAL ENCOUNTER: Primary | ICD-10-CM

## 2022-12-27 DIAGNOSIS — M84.40XA INSUFFICIENCY FRACTURE: ICD-10-CM

## 2022-12-27 PROCEDURE — 3017F COLORECTAL CA SCREEN DOC REV: CPT | Performed by: ORTHOPAEDIC SURGERY

## 2022-12-27 PROCEDURE — 1036F TOBACCO NON-USER: CPT | Performed by: ORTHOPAEDIC SURGERY

## 2022-12-27 PROCEDURE — G8400 PT W/DXA NO RESULTS DOC: HCPCS | Performed by: ORTHOPAEDIC SURGERY

## 2022-12-27 PROCEDURE — G8484 FLU IMMUNIZE NO ADMIN: HCPCS | Performed by: ORTHOPAEDIC SURGERY

## 2022-12-27 PROCEDURE — G8427 DOCREV CUR MEDS BY ELIG CLIN: HCPCS | Performed by: ORTHOPAEDIC SURGERY

## 2022-12-27 PROCEDURE — 1123F ACP DISCUSS/DSCN MKR DOCD: CPT | Performed by: ORTHOPAEDIC SURGERY

## 2022-12-27 PROCEDURE — 99213 OFFICE O/P EST LOW 20 MIN: CPT | Performed by: ORTHOPAEDIC SURGERY

## 2022-12-27 PROCEDURE — G8417 CALC BMI ABV UP PARAM F/U: HCPCS | Performed by: ORTHOPAEDIC SURGERY

## 2022-12-27 PROCEDURE — 1090F PRES/ABSN URINE INCON ASSESS: CPT | Performed by: ORTHOPAEDIC SURGERY

## 2023-01-10 DIAGNOSIS — S82.142A CLOSED FRACTURE OF LEFT TIBIAL PLATEAU, INITIAL ENCOUNTER: Primary | ICD-10-CM

## 2023-01-20 DIAGNOSIS — S82.142A CLOSED FRACTURE OF LEFT TIBIAL PLATEAU, INITIAL ENCOUNTER: Primary | ICD-10-CM

## 2023-01-24 ENCOUNTER — OFFICE VISIT (OUTPATIENT)
Dept: ORTHOPEDIC SURGERY | Age: 73
End: 2023-01-24
Payer: MEDICARE

## 2023-01-24 VITALS — TEMPERATURE: 98 F | BODY MASS INDEX: 42.48 KG/M2 | WEIGHT: 225 LBS | HEIGHT: 61 IN

## 2023-01-24 DIAGNOSIS — M84.40XA INSUFFICIENCY FRACTURE: ICD-10-CM

## 2023-01-24 DIAGNOSIS — Z71.82 EXERCISE COUNSELING: ICD-10-CM

## 2023-01-24 DIAGNOSIS — S82.142A CLOSED FRACTURE OF LEFT TIBIAL PLATEAU, INITIAL ENCOUNTER: Primary | ICD-10-CM

## 2023-01-24 DIAGNOSIS — M17.11 PRIMARY OSTEOARTHRITIS OF RIGHT KNEE: ICD-10-CM

## 2023-01-24 DIAGNOSIS — M17.12 PRIMARY OSTEOARTHRITIS OF LEFT KNEE: ICD-10-CM

## 2023-01-24 PROCEDURE — G8400 PT W/DXA NO RESULTS DOC: HCPCS | Performed by: ORTHOPAEDIC SURGERY

## 2023-01-24 PROCEDURE — 1090F PRES/ABSN URINE INCON ASSESS: CPT | Performed by: ORTHOPAEDIC SURGERY

## 2023-01-24 PROCEDURE — 1036F TOBACCO NON-USER: CPT | Performed by: ORTHOPAEDIC SURGERY

## 2023-01-24 PROCEDURE — 3017F COLORECTAL CA SCREEN DOC REV: CPT | Performed by: ORTHOPAEDIC SURGERY

## 2023-01-24 PROCEDURE — 1123F ACP DISCUSS/DSCN MKR DOCD: CPT | Performed by: ORTHOPAEDIC SURGERY

## 2023-01-24 PROCEDURE — G8484 FLU IMMUNIZE NO ADMIN: HCPCS | Performed by: ORTHOPAEDIC SURGERY

## 2023-01-24 PROCEDURE — 99214 OFFICE O/P EST MOD 30 MIN: CPT | Performed by: ORTHOPAEDIC SURGERY

## 2023-01-24 PROCEDURE — G8427 DOCREV CUR MEDS BY ELIG CLIN: HCPCS | Performed by: ORTHOPAEDIC SURGERY

## 2023-01-24 PROCEDURE — G8417 CALC BMI ABV UP PARAM F/U: HCPCS | Performed by: ORTHOPAEDIC SURGERY

## 2023-01-24 NOTE — PROGRESS NOTES
Chief Complaint   Patient presents with    Knee Pain     Left knee tibial plateau fx f/u does okay unless she locks her knee. HPI:    Patient is 74 y.o. female complaining chronic, atraumatic, insidious onset left knee pain for several weeks and was found to have a tibial plateau insuffiencey fracture. She admits to stiffness, deep, aching pain, swelling, difficulty with stairs and ambulating far distances. She admits to gross instability specifically in her Left knee. Previous treatments include rest, ice, and anti-inflammatory medication and HEP without much relief. She is currently in SNF and using a knee immobilizer. On follow-up today, patient is doing better overall. She is maintain the immobilizer since she is still having issues obtaining functional adjustable brace from nursing home. However she has been doing well with physical therapy with progressive weightbearing as tolerated. ROS:    Skin: (-) rash,(-) psoriasis,(-) eczema, (-)skin cancer. Neurologic: (-)numbness, (-)tingling, (-)headaches, (-) LOC. Cardiovascular: (-) Chest pain, (-) swelling in legs/feet, (-) SOB, (-) cramping in legs/feet with walking.     All other review of systems negative except stated above or in HPI      Past Medical History:   Diagnosis Date    Heart murmur     Hypertension     Lymphedema     Metabolic syndrome     Osteoarthritis     Osteopenia     Pinched nerve      Past Surgical History:   Procedure Laterality Date    ABDOMEN SURGERY  1984    VEIN SURGERY         Current Outpatient Medications:     aspirin EC 81 MG EC tablet, Take 1 tablet by mouth in the morning and at bedtime, Disp: 30 tablet, Rfl: 0    b complex vitamins capsule, Take 1 capsule by mouth Daily with supper, Disp: , Rfl:     vitamin B-12 (CYANOCOBALAMIN) 1000 MCG tablet, Take 1,000 mcg by mouth daily, Disp: , Rfl:     calcium carbonate 1500 (600 Ca) MG TABS tablet, Take 600 mg by mouth daily, Disp: , Rfl:     Omega-3 Fatty Acids (FISH OIL) 1000 MG capsule, Take 2,000 mg by mouth 2 times daily, Disp: , Rfl:     Cholecalciferol (VITAMIN D) 50 MCG (2000 UT) CAPS capsule, Take 2,000 Units by mouth in the morning and at bedtime, Disp: , Rfl:     Garlic 3340 MG CAPS, Take 3,000 mg by mouth in the morning and at bedtime, Disp: , Rfl:     cod liver oil CAPS, Take 2 capsules by mouth in the morning and at bedtime, Disp: , Rfl:     lisinopril (PRINIVIL;ZESTRIL) 20 MG tablet, Take 20 mg by mouth 2 times daily, Disp: , Rfl:     Multiple Vitamins-Minerals (CENTRUM PO), Take 1 tablet by mouth daily, Disp: , Rfl:   No Known Allergies  Social History     Socioeconomic History    Marital status: Single     Spouse name: Not on file    Number of children: Not on file    Years of education: Not on file    Highest education level: Not on file   Occupational History    Not on file   Tobacco Use    Smoking status: Former    Smokeless tobacco: Never   Vaping Use    Vaping Use: Never used   Substance and Sexual Activity    Alcohol use: Yes     Comment: social    Drug use: No    Sexual activity: Not Currently     Partners: Male   Other Topics Concern    Not on file   Social History Narrative    Not on file     Social Determinants of Health     Financial Resource Strain: Not on file   Food Insecurity: Not on file   Transportation Needs: Not on file   Physical Activity: Not on file   Stress: Not on file   Social Connections: Not on file   Intimate Partner Violence: Not on file   Housing Stability: Not on file     Family History   Problem Relation Age of Onset    Heart Failure Mother     Alzheimer's Disease Father     Anemia Maternal Grandmother     Diabetes Paternal Grandmother            Physical Exam:    Temp 80 °F (36.7 °C)   Ht 5' 1\" (1.549 m)   Wt 225 lb (102.1 kg)   BMI 42.51 kg/m²     GENERAL: alert, appears stated age, cooperative, no acute distress    HEENT: Head is normocephalic, atraumatic. PERRLA. SKIN: Clean, dry, intact.  There is not any cellulitis or cutaneous lesions noted in the lower extremities     PULMONARY: breathing is regular and unlabored, no acute distress     CV: The bilateral lower extremities are warm and well-perfused with brisk capillary refill. 2+ pulses LE bilateral.     ABDOMINAL: Nontender, nondistended     PSYCHIATRY: Pleasant mood, appropriate behavior, follows commands     NEURO: Sensation is intact distally with light touch with no alteration. Motor exam of the lower extremities show quadriceps, hamstrings, foot dorsiflexion and plantarflexion grossly intact 5/5. LYMPH: No lymphedema present distally in upper or lower extremity. MUSCULOSKELETAL:    Left knee Exam:    mild effusion noted. No erythema/induration/fluctuance. Posterior medial joint line TTP. Stable to varus and valgus at 0 and 30 degrees of flexion. Negative Lachman's and posterior drawer. Negative patellar grind test and J sign. Compartments soft and compressible throughout leg. Active range of motion 0-120 with pain. Good improvement since last visit    Imaging:  XR KNEE LEFT (3 VIEWS)    Result Date: 10/14/2022  EXAMINATION: 4 XRAY VIEWS OF THE LEFT TIBIA AND FIBULA; THREE XRAY VIEWS OF THE LEFT KNEE 10/14/2022 10:14 am COMPARISON: 05/10/2021 HISTORY: ORDERING SYSTEM PROVIDED HISTORY: r/o fx TECHNOLOGIST PROVIDED HISTORY: Reason for exam:->r/o fx FINDINGS: There is subchondral sclerosis and severe compartmental narrowing medially and laterally about the knee joint. There is a fracture of the medial tibial plateau which may be chronic. Ankle mortise is intact but there are small anterior posterior spurring at the tibiotalar articulation. There are degenerative changes at the patellofemoral articulation particularly laterally. There is a small joint effusion. Significant degenerative changes at the knee joint. There appears to be a chronic fracture of the medial tibial plateau.   It was suggested on the study 05/10/2021 but appears to be somewhat more displaced     XR TIBIA FIBULA LEFT (2 VIEWS)    Result Date: 10/14/2022  EXAMINATION: 4 XRAY VIEWS OF THE LEFT TIBIA AND FIBULA; THREE XRAY VIEWS OF THE LEFT KNEE 10/14/2022 10:14 am COMPARISON: 05/10/2021 HISTORY: ORDERING SYSTEM PROVIDED HISTORY: r/o fx TECHNOLOGIST PROVIDED HISTORY: Reason for exam:->r/o fx FINDINGS: There is subchondral sclerosis and severe compartmental narrowing medially and laterally about the knee joint. There is a fracture of the medial tibial plateau which may be chronic. Ankle mortise is intact but there are small anterior posterior spurring at the tibiotalar articulation. There are degenerative changes at the patellofemoral articulation particularly laterally. There is a small joint effusion. Significant degenerative changes at the knee joint. There appears to be a chronic fracture of the medial tibial plateau. It was suggested on the study 05/10/2021 but appears to be somewhat more displaced     CT KNEE LEFT WO CONTRAST    Result Date: 10/14/2022  EXAMINATION: CT OF THE LEFT KNEE WITHOUT CONTRAST 10/14/2022 1:58 pm TECHNIQUE: CT of the left knee was performed without the administration of intravenous contrast.  Multiplanar reformatted images are provided for review. Automated exposure control, iterative reconstruction, and/or weight based adjustment of the mA/kV was utilized to reduce the radiation dose to as low as reasonably achievable. COMPARISON: None. HISTORY ORDERING SYSTEM PROVIDED HISTORY: tibial plateau TECHNOLOGIST PROVIDED HISTORY: Reason for exam:->tibial plateau Decision Support Exception - unselect if not a suspected or confirmed emergency medical condition->Emergency Medical Condition (MA) FINDINGS: Bones: There is a vertically oriented fracture involving the central to lateral aspect of the medial tibial plateau. There is a small vertically oriented fracture involving the lateral tibial plateau peripherally. Severe osteoarthritis. Small knee joint effusion. Because there fractures involving the medial and lateral tibial plateau, the findings technically represent a Schatzker classification 5 fracture, the lateral tibial plateau fracture is small and peripheral. Soft Tissue: No abnormal mass or fluid collection. Joint: Severe osteoarthritis. Patella appears to be in normal position. Moderate to severe osteophytosis of all 3 knee joint compartments. 1.  Fractures of medial and lateral tibial plateau 2. Small knee joint effusion and severe tricompartmental osteoarthritis. XR KNEE LEFT (1-2 VIEWS)    Result Date: 10/26/2022  EXAMINATION: TWO XRAY VIEWS OF THE LEFT KNEE 10/25/2022 2:09 pm COMPARISON: 14 October 2022 HISTORY: ORDERING SYSTEM PROVIDED HISTORY: Primary osteoarthritis of left knee FINDINGS: There is again noted fragmentation at the medial tibial plateau with increasing deformity. A lateral tibial plateau fracture is no longer clearly evident. Degenerative arthropathy appears somewhat progressive at the medial weight-bearing compartment and is relatively stable elsewhere. Unremarkable soft tissues. Increasing deformity associated with medial tibial plateau fracture. No clear redemonstration of lateral tibial plateau fracture. Pronounced tricompartmental osteoarthritis. XR KNEE LEFT (1-2 VIEWS)    Result Date: 11/8/2022  EXAMINATION: TWO XRAY VIEWS OF THE LEFT KNEE 11/8/2022 2:21 pm COMPARISON: X-ray dated 10/25/2022 HISTORY: ORDERING SYSTEM PROVIDED HISTORY: Closed fracture of left tibial plateau, initial encounter FINDINGS: Similar alignment of proximal medial tibial plateau fracture and irregularity of chronic deformity with background moderate to severe DJD of tricompartmental involvement and joint space narrowing. No progressive malalignment.   Nondisplaced proximal fibular fracture similar to prior     No significant change in alignment from 10/25/2022 comparison of the medial tibial plateau fracture and chronic deformity along with intermixed DJD. Stable nondisplaced fracture of the proximal fibula. XR KNEE LEFT (1-2 VIEWS)    Result Date: 11/22/2022  EXAMINATION: TWO XRAY VIEWS OF THE LEFT KNEE 11/22/2022 12:56 pm COMPARISON: 8 November 2022 HISTORY: ORDERING SYSTEM PROVIDED HISTORY: Closed fracture of left tibial plateau, initial encounter FINDINGS: Stable alignment of medial tibial plateau fracture with no new callus formation. Severe degenerative arthropathy at all 3 compartments. No new abnormal bone or soft tissue findings. Unchanged appearance of medial tibial plateau fracture. Severe degenerative arthropathy as before. XR KNEE LEFT (1-2 VIEWS)    Result Date: 12/14/2022  EXAMINATION: TWO XRAY VIEWS OF THE LEFT KNEE 12/13/2022 12:20 pm COMPARISON: 22 November 2022 HISTORY: ORDERING SYSTEM PROVIDED HISTORY: Closed fracture of left tibial plateau, initial encounter FINDINGS: Stable alignment of medial tibial plateau fracture. Very severe tricompartmental left knee osteoarthritis as before. Small knee joint effusion as before. Unchanged appearance of medial tibial plateau fracture and very severe osteoarthritis. Very small knee joint effusion as before. XR KNEE LEFT (1-2 VIEWS)    Result Date: 12/28/2022  EXAMINATION: TWO XRAY VIEWS OF THE LEFT KNEE 12/27/2022 2:41 pm COMPARISON: X-ray dated 12/13/2022 HISTORY: ORDERING SYSTEM PROVIDED HISTORY: Closed fracture of left tibial plateau, initial encounter FINDINGS: Similar appearance of medial tibial plateau fracture deformity and chronic involvement of severe DJD including joint space loss and osteophytosis with unchanged configuration to prior. No progressive malalignment or acute fracture otherwise.      Stable alignment of chronic medial tibial plateau fracture and deformity along with severe DJD of the left knee       XR KNEE LEFT (1-2 VIEWS)    Result Date: 1/24/2023  EXAMINATION: TWO XRAY VIEWS OF THE LEFT KNEE 1/24/2023 1:42 pm COMPARISON: December 27, 2022 HISTORY: ORDERING SYSTEM PROVIDED HISTORY: Closed fracture of left tibial plateau, initial encounter FINDINGS: Redemonstration of chronic medial tibial plateau fracture. Redemonstration of medial subluxation of distal femur. Severe medial joint space narrowing as well as narrowing of lateral joint space. Osteophytosis seen along margins of medial and lateral joint space. No obvious synovial effusion. No acute fracture or dislocation. 1. Stable position of chronic medial tibial plateau fracture. 2. Increased medial subluxation of distal femur in relation to the tibia. 3. Tricompartmental osteoarthritis. XR KNEE LEFT (1-2 VIEWS)    Result Date: 1/24/2023  EXAMINATION: TWO XRAY VIEWS OF THE LEFT KNEE 1/24/2023 1:42 pm COMPARISON: December 27, 2022 HISTORY: ORDERING SYSTEM PROVIDED HISTORY: Closed fracture of left tibial plateau, initial encounter FINDINGS: Redemonstration of chronic medial tibial plateau fracture. Redemonstration of medial subluxation of distal femur. Severe medial joint space narrowing as well as narrowing of lateral joint space. Osteophytosis seen along margins of medial and lateral joint space. No obvious synovial effusion. No acute fracture or dislocation. 1. Stable position of chronic medial tibial plateau fracture. 2. Increased medial subluxation of distal femur in relation to the tibia. 3. Tricompartmental osteoarthritis. Shanta Purvis was seen today for knee pain. Diagnoses and all orders for this visit:    Closed fracture of left tibial plateau, initial encounter  -     Amb External Referral To Physical Therapy    Insufficiency fracture    Primary osteoarthritis of right knee    Primary osteoarthritis of left knee    Exercise counseling            Patient seen and examined. Imaging and CT reviewed with patient in detail.   Natural history and course discussed with patient in long discussion  Treatment options discussed with patient in detail including risks and benefits. Patient should do well with conservative management as patient would like to avoid surgery at this time. Patient will continue nonweightbearing but will be provided an adjustable knee brace at this time. She is weightbearing as tolerated. Continue with rehab and physical therapy for range of motion exercises, ADLs, and pain control. Follow-up in 4 weeks for recheck and new x-rays. In a 15 minute assessment and discussion, patient was re fitted for a removable knee brace readjusted and reapplied. She was educated in detail how to use brace and the importance of use as well as range of motion and HEP exercises. Renaldo Gaona,           25 minutes was spent with patient. 50% or greater was spent counseling the patient.

## 2023-03-02 ENCOUNTER — OFFICE VISIT (OUTPATIENT)
Dept: ORTHOPEDIC SURGERY | Age: 73
End: 2023-03-02

## 2023-03-02 DIAGNOSIS — M17.11 PRIMARY OSTEOARTHRITIS OF RIGHT KNEE: Primary | ICD-10-CM

## 2023-03-02 RX ORDER — LISINOPRIL 20 MG/1
20 TABLET ORAL 2 TIMES DAILY
Qty: 180 TABLET | Refills: 1 | Status: SHIPPED | OUTPATIENT
Start: 2023-03-02

## 2023-03-03 DIAGNOSIS — S82.142A CLOSED FRACTURE OF LEFT TIBIAL PLATEAU, INITIAL ENCOUNTER: Primary | ICD-10-CM

## 2023-03-03 NOTE — PROGRESS NOTES
Chief Complaint   Patient presents with    Injections     Right knee Zilretta injection        I will proceed with a cortisone injection in the Right knee. Verbal and written consent was obtained for the injections. The skin was prepped with alcohol. A prepared mixture of 32 mg of Zilretta and 5mL diluent was injected to Right knee. The injection was given through the lateral side of the knee. The patient tolerated the injection well. I will see the patient back prn. Sarah Roberson was seen today for injections.     Diagnoses and all orders for this visit:    Primary osteoarthritis of right knee  -     PA ARTHROCENTESIS ASPIR&/INJ MAJOR JT/BURSA W/O US  -     triamcinolone acetonide (ZILRETTA) intra-articular injection 32 mg

## 2023-03-07 ENCOUNTER — OFFICE VISIT (OUTPATIENT)
Dept: ORTHOPEDIC SURGERY | Age: 73
End: 2023-03-07
Payer: MEDICARE

## 2023-03-07 VITALS — TEMPERATURE: 98 F | HEIGHT: 61 IN | WEIGHT: 225 LBS | BODY MASS INDEX: 42.48 KG/M2

## 2023-03-07 DIAGNOSIS — M17.12 PRIMARY OSTEOARTHRITIS OF LEFT KNEE: ICD-10-CM

## 2023-03-07 DIAGNOSIS — Z71.82 EXERCISE COUNSELING: ICD-10-CM

## 2023-03-07 DIAGNOSIS — S82.142A CLOSED FRACTURE OF LEFT TIBIAL PLATEAU, INITIAL ENCOUNTER: Primary | ICD-10-CM

## 2023-03-07 DIAGNOSIS — M84.40XA INSUFFICIENCY FRACTURE: ICD-10-CM

## 2023-03-07 DIAGNOSIS — M17.11 PRIMARY OSTEOARTHRITIS OF RIGHT KNEE: ICD-10-CM

## 2023-03-07 PROCEDURE — G8400 PT W/DXA NO RESULTS DOC: HCPCS | Performed by: ORTHOPAEDIC SURGERY

## 2023-03-07 PROCEDURE — G8484 FLU IMMUNIZE NO ADMIN: HCPCS | Performed by: ORTHOPAEDIC SURGERY

## 2023-03-07 PROCEDURE — 1090F PRES/ABSN URINE INCON ASSESS: CPT | Performed by: ORTHOPAEDIC SURGERY

## 2023-03-07 PROCEDURE — 3017F COLORECTAL CA SCREEN DOC REV: CPT | Performed by: ORTHOPAEDIC SURGERY

## 2023-03-07 PROCEDURE — 99214 OFFICE O/P EST MOD 30 MIN: CPT | Performed by: ORTHOPAEDIC SURGERY

## 2023-03-07 PROCEDURE — 1123F ACP DISCUSS/DSCN MKR DOCD: CPT | Performed by: ORTHOPAEDIC SURGERY

## 2023-03-07 PROCEDURE — G8417 CALC BMI ABV UP PARAM F/U: HCPCS | Performed by: ORTHOPAEDIC SURGERY

## 2023-03-07 PROCEDURE — 1036F TOBACCO NON-USER: CPT | Performed by: ORTHOPAEDIC SURGERY

## 2023-03-07 PROCEDURE — G8427 DOCREV CUR MEDS BY ELIG CLIN: HCPCS | Performed by: ORTHOPAEDIC SURGERY

## 2023-03-07 NOTE — PROGRESS NOTES
Chief Complaint   Patient presents with    Knee Pain     Left knee tibial plateau fx f/u doing well just doesn't have the full bend. HPI:    Patient is 74 y.o. female complaining chronic, atraumatic, insidious onset left knee pain for several weeks and was found to have a tibial plateau insuffiencey fracture. She admits to stiffness, deep, aching pain, swelling, difficulty with stairs and ambulating far distances. She admits to gross instability specifically in her Left knee. Previous treatments include rest, ice, and anti-inflammatory medication and HEP without much relief. She is currently back home from [facility] and using a knee functional brace. On follow-up today, patient is doing better overall. she has been doing well with physical therapy with progressive weightbearing as tolerated. ROS:    Skin: (-) rash,(-) psoriasis,(-) eczema, (-)skin cancer. Neurologic: (-)numbness, (-)tingling, (-)headaches, (-) LOC. Cardiovascular: (-) Chest pain, (-) swelling in legs/feet, (-) SOB, (-) cramping in legs/feet with walking.     All other review of systems negative except stated above or in HPI      Past Medical History:   Diagnosis Date    Heart murmur     Hypertension     Lymphedema     Metabolic syndrome     Osteoarthritis     Osteopenia     Pinched nerve      Past Surgical History:   Procedure Laterality Date    ABDOMEN SURGERY  1984    VEIN SURGERY         Current Outpatient Medications:     lisinopril (PRINIVIL;ZESTRIL) 20 MG tablet, Take 1 tablet by mouth 2 times daily, Disp: 180 tablet, Rfl: 1    aspirin EC 81 MG EC tablet, Take 1 tablet by mouth in the morning and at bedtime, Disp: 30 tablet, Rfl: 0    b complex vitamins capsule, Take 1 capsule by mouth Daily with supper, Disp: , Rfl:     vitamin B-12 (CYANOCOBALAMIN) 1000 MCG tablet, Take 1,000 mcg by mouth daily, Disp: , Rfl:     calcium carbonate 1500 (600 Ca) MG TABS tablet, Take 600 mg by mouth daily, Disp: , Rfl:     Omega-3 Fatty Acids (FISH OIL) 1000 MG capsule, Take 2,000 mg by mouth 2 times daily, Disp: , Rfl:     Cholecalciferol (VITAMIN D) 50 MCG (2000 UT) CAPS capsule, Take 2,000 Units by mouth in the morning and at bedtime, Disp: , Rfl:     Garlic 1723 MG CAPS, Take 3,000 mg by mouth in the morning and at bedtime, Disp: , Rfl:     cod liver oil CAPS, Take 2 capsules by mouth in the morning and at bedtime, Disp: , Rfl:     Multiple Vitamins-Minerals (CENTRUM PO), Take 1 tablet by mouth daily, Disp: , Rfl:   No Known Allergies  Social History     Socioeconomic History    Marital status: Single     Spouse name: Not on file    Number of children: Not on file    Years of education: Not on file    Highest education level: Not on file   Occupational History    Not on file   Tobacco Use    Smoking status: Former    Smokeless tobacco: Never   Vaping Use    Vaping Use: Never used   Substance and Sexual Activity    Alcohol use: Yes     Comment: social    Drug use: No    Sexual activity: Not Currently     Partners: Male   Other Topics Concern    Not on file   Social History Narrative    Not on file     Social Determinants of Health     Financial Resource Strain: Not on file   Food Insecurity: Not on file   Transportation Needs: Not on file   Physical Activity: Not on file   Stress: Not on file   Social Connections: Not on file   Intimate Partner Violence: Not on file   Housing Stability: Not on file     Family History   Problem Relation Age of Onset    Heart Failure Mother     Alzheimer's Disease Father     Anemia Maternal Grandmother     Diabetes Paternal Grandmother            Physical Exam:    Temp 98 °F (36.7 °C)   Ht 5' 1\" (1.549 m)   Wt 225 lb (102.1 kg)   BMI 42.51 kg/m²     GENERAL: alert, appears stated age, cooperative, no acute distress    HEENT: Head is normocephalic, atraumatic. PERRLA. SKIN: Clean, dry, intact.  There is not any cellulitis or cutaneous lesions noted in the lower extremities     PULMONARY: breathing is regular and unlabored, no acute distress     CV: The bilateral lower extremities are warm and well-perfused with brisk capillary refill. 2+ pulses LE bilateral.     ABDOMINAL: Nontender, nondistended     PSYCHIATRY: Pleasant mood, appropriate behavior, follows commands     NEURO: Sensation is intact distally with light touch with no alteration. Motor exam of the lower extremities show quadriceps, hamstrings, foot dorsiflexion and plantarflexion grossly intact 5/5. LYMPH: No lymphedema present distally in upper or lower extremity. MUSCULOSKELETAL:    Left knee Exam:    mild effusion noted. No erythema/induration/fluctuance. Posterior medial joint line TTP. Stable to varus and valgus at 0 and 30 degrees of flexion. Negative Lachman's and posterior drawer. Negative patellar grind test and J sign. Compartments soft and compressible throughout leg. Active range of motion 0-120 with pain. Good improvement since last visit    Imaging:  XR KNEE LEFT (3 VIEWS)    Result Date: 10/14/2022  EXAMINATION: 4 XRAY VIEWS OF THE LEFT TIBIA AND FIBULA; THREE XRAY VIEWS OF THE LEFT KNEE 10/14/2022 10:14 am COMPARISON: 05/10/2021 HISTORY: ORDERING SYSTEM PROVIDED HISTORY: r/o fx TECHNOLOGIST PROVIDED HISTORY: Reason for exam:->r/o fx FINDINGS: There is subchondral sclerosis and severe compartmental narrowing medially and laterally about the knee joint. There is a fracture of the medial tibial plateau which may be chronic. Ankle mortise is intact but there are small anterior posterior spurring at the tibiotalar articulation. There are degenerative changes at the patellofemoral articulation particularly laterally. There is a small joint effusion. Significant degenerative changes at the knee joint. There appears to be a chronic fracture of the medial tibial plateau.   It was suggested on the study 05/10/2021 but appears to be somewhat more displaced     XR TIBIA FIBULA LEFT (2 VIEWS)    Result Date: 10/14/2022  EXAMINATION: 4 XRAY VIEWS OF THE LEFT TIBIA AND FIBULA; THREE XRAY VIEWS OF THE LEFT KNEE 10/14/2022 10:14 am COMPARISON: 05/10/2021 HISTORY: ORDERING SYSTEM PROVIDED HISTORY: r/o fx TECHNOLOGIST PROVIDED HISTORY: Reason for exam:->r/o fx FINDINGS: There is subchondral sclerosis and severe compartmental narrowing medially and laterally about the knee joint. There is a fracture of the medial tibial plateau which may be chronic. Ankle mortise is intact but there are small anterior posterior spurring at the tibiotalar articulation. There are degenerative changes at the patellofemoral articulation particularly laterally. There is a small joint effusion. Significant degenerative changes at the knee joint. There appears to be a chronic fracture of the medial tibial plateau. It was suggested on the study 05/10/2021 but appears to be somewhat more displaced     CT KNEE LEFT WO CONTRAST    Result Date: 10/14/2022  EXAMINATION: CT OF THE LEFT KNEE WITHOUT CONTRAST 10/14/2022 1:58 pm TECHNIQUE: CT of the left knee was performed without the administration of intravenous contrast.  Multiplanar reformatted images are provided for review. Automated exposure control, iterative reconstruction, and/or weight based adjustment of the mA/kV was utilized to reduce the radiation dose to as low as reasonably achievable. COMPARISON: None. HISTORY ORDERING SYSTEM PROVIDED HISTORY: tibial plateau TECHNOLOGIST PROVIDED HISTORY: Reason for exam:->tibial plateau Decision Support Exception - unselect if not a suspected or confirmed emergency medical condition->Emergency Medical Condition (MA) FINDINGS: Bones: There is a vertically oriented fracture involving the central to lateral aspect of the medial tibial plateau. There is a small vertically oriented fracture involving the lateral tibial plateau peripherally. Severe osteoarthritis. Small knee joint effusion.  Because there fractures involving the medial and lateral tibial plateau, the findings technically represent a Schatzker classification 5 fracture, the lateral tibial plateau fracture is small and peripheral. Soft Tissue: No abnormal mass or fluid collection. Joint: Severe osteoarthritis. Patella appears to be in normal position. Moderate to severe osteophytosis of all 3 knee joint compartments. 1.  Fractures of medial and lateral tibial plateau 2. Small knee joint effusion and severe tricompartmental osteoarthritis. XR KNEE LEFT (1-2 VIEWS)    Result Date: 10/26/2022  EXAMINATION: TWO XRAY VIEWS OF THE LEFT KNEE 10/25/2022 2:09 pm COMPARISON: 14 October 2022 HISTORY: ORDERING SYSTEM PROVIDED HISTORY: Primary osteoarthritis of left knee FINDINGS: There is again noted fragmentation at the medial tibial plateau with increasing deformity. A lateral tibial plateau fracture is no longer clearly evident. Degenerative arthropathy appears somewhat progressive at the medial weight-bearing compartment and is relatively stable elsewhere. Unremarkable soft tissues. Increasing deformity associated with medial tibial plateau fracture. No clear redemonstration of lateral tibial plateau fracture. Pronounced tricompartmental osteoarthritis. XR KNEE LEFT (1-2 VIEWS)    Result Date: 11/8/2022  EXAMINATION: TWO XRAY VIEWS OF THE LEFT KNEE 11/8/2022 2:21 pm COMPARISON: X-ray dated 10/25/2022 HISTORY: ORDERING SYSTEM PROVIDED HISTORY: Closed fracture of left tibial plateau, initial encounter FINDINGS: Similar alignment of proximal medial tibial plateau fracture and irregularity of chronic deformity with background moderate to severe DJD of tricompartmental involvement and joint space narrowing. No progressive malalignment. Nondisplaced proximal fibular fracture similar to prior     No significant change in alignment from 10/25/2022 comparison of the medial tibial plateau fracture and chronic deformity along with intermixed DJD. Stable nondisplaced fracture of the proximal fibula.        XR KNEE LEFT (1-2 VIEWS)    Result Date: 11/22/2022  EXAMINATION: TWO XRAY VIEWS OF THE LEFT KNEE 11/22/2022 12:56 pm COMPARISON: 8 November 2022 HISTORY: ORDERING SYSTEM PROVIDED HISTORY: Closed fracture of left tibial plateau, initial encounter FINDINGS: Stable alignment of medial tibial plateau fracture with no new callus formation. Severe degenerative arthropathy at all 3 compartments. No new abnormal bone or soft tissue findings. Unchanged appearance of medial tibial plateau fracture. Severe degenerative arthropathy as before. XR KNEE LEFT (1-2 VIEWS)    Result Date: 12/14/2022  EXAMINATION: TWO XRAY VIEWS OF THE LEFT KNEE 12/13/2022 12:20 pm COMPARISON: 22 November 2022 HISTORY: ORDERING SYSTEM PROVIDED HISTORY: Closed fracture of left tibial plateau, initial encounter FINDINGS: Stable alignment of medial tibial plateau fracture. Very severe tricompartmental left knee osteoarthritis as before. Small knee joint effusion as before. Unchanged appearance of medial tibial plateau fracture and very severe osteoarthritis. Very small knee joint effusion as before. XR KNEE LEFT (1-2 VIEWS)    Result Date: 12/28/2022  EXAMINATION: TWO XRAY VIEWS OF THE LEFT KNEE 12/27/2022 2:41 pm COMPARISON: X-ray dated 12/13/2022 HISTORY: ORDERING SYSTEM PROVIDED HISTORY: Closed fracture of left tibial plateau, initial encounter FINDINGS: Similar appearance of medial tibial plateau fracture deformity and chronic involvement of severe DJD including joint space loss and osteophytosis with unchanged configuration to prior. No progressive malalignment or acute fracture otherwise.      Stable alignment of chronic medial tibial plateau fracture and deformity along with severe DJD of the left knee       XR KNEE LEFT (1-2 VIEWS)    Result Date: 1/24/2023  EXAMINATION: TWO XRAY VIEWS OF THE LEFT KNEE 1/24/2023 1:42 pm COMPARISON: December 27, 2022 HISTORY: ORDERING SYSTEM PROVIDED HISTORY: Closed fracture of left tibial plateau, initial encounter FINDINGS: Redemonstration of chronic medial tibial plateau fracture. Redemonstration of medial subluxation of distal femur. Severe medial joint space narrowing as well as narrowing of lateral joint space. Osteophytosis seen along margins of medial and lateral joint space. No obvious synovial effusion. No acute fracture or dislocation. 1. Stable position of chronic medial tibial plateau fracture. 2. Increased medial subluxation of distal femur in relation to the tibia. 3. Tricompartmental osteoarthritis. XR KNEE LEFT (1-2 VIEWS)    Result Date: 1/24/2023  EXAMINATION: TWO XRAY VIEWS OF THE LEFT KNEE 1/24/2023 1:42 pm COMPARISON: December 27, 2022 HISTORY: ORDERING SYSTEM PROVIDED HISTORY: Closed fracture of left tibial plateau, initial encounter FINDINGS: Redemonstration of chronic medial tibial plateau fracture. Redemonstration of medial subluxation of distal femur. Severe medial joint space narrowing as well as narrowing of lateral joint space. Osteophytosis seen along margins of medial and lateral joint space. No obvious synovial effusion. No acute fracture or dislocation. 1. Stable position of chronic medial tibial plateau fracture. 2. Increased medial subluxation of distal femur in relation to the tibia. 3. Tricompartmental osteoarthritis. XR KNEE LEFT (1-2 VIEWS)    Result Date: 3/7/2023  EXAMINATION: TWO XRAY VIEWS OF THE LEFT KNEE 3/7/2023 12:21 pm COMPARISON: 24 January 2023 HISTORY: ORDERING SYSTEM PROVIDED HISTORY: Closed fracture of left tibial plateau, initial encounter FINDINGS: Stable alignment of medial tibial plateau fracture with lateral translation of the tibia relative to the femur and very severe tricompartmental osteoarthritis. Very small knee joint effusion. Unchanged appearance of medial tibial plateau fracture and deranged alignment at the femoral tibial joint. Severe osteoarthritis as before.          Raúl Joe was seen today for knee pain.    Diagnoses and all orders for this visit:    Closed fracture of left tibial plateau, initial encounter    Insufficiency fracture    Primary osteoarthritis of left knee    Exercise counseling    Primary osteoarthritis of right knee        Patient seen and examined. Imaging and CT reviewed with patient in detail. Natural history and course discussed with patient in long discussion  Treatment options discussed with patient in detail including risks and benefits. Patient should do well with conservative management as patient would like to avoid surgery at this time. Patient will continue weightbearing with adjustable knee brace at this time. She is weightbearing as tolerated. Continue with rehab and physical therapy for range of motion exercises, ADLs, and pain control. Follow-up in 4 weeks for recheck and new x-rays. In a 15 minute assessment and discussion, patient was re fitted for a removable knee brace readjusted and reapplied. She was educated in detail how to use brace and the importance of use as well as range of motion and HEP exercises. In a 15 minute assessment and discussion, patient was counseled on weight loss, healthy diet, and physical activity relating to this condition. She was educated with options in detail including nutrition, joining a health club/ weight loss program, and use of cardio equipment such as the Arc Trainer and the importance of use as well as range of motion and HEP exercises for weight loss and general health. F/U 4-6 weeks with xr          Stephen Montelongo DO            30 minutes was spent with patient. 50% or greater was spent counseling the patient.

## 2023-04-06 DIAGNOSIS — S82.142A CLOSED FRACTURE OF LEFT TIBIAL PLATEAU, INITIAL ENCOUNTER: Primary | ICD-10-CM

## 2023-04-11 ENCOUNTER — OFFICE VISIT (OUTPATIENT)
Dept: ORTHOPEDIC SURGERY | Age: 73
End: 2023-04-11

## 2023-04-11 VITALS — HEIGHT: 61 IN | BODY MASS INDEX: 42.48 KG/M2 | WEIGHT: 225 LBS | TEMPERATURE: 98 F

## 2023-04-11 DIAGNOSIS — S82.142A CLOSED FRACTURE OF LEFT TIBIAL PLATEAU, INITIAL ENCOUNTER: Primary | ICD-10-CM

## 2023-04-11 DIAGNOSIS — Z71.82 EXERCISE COUNSELING: ICD-10-CM

## 2023-04-11 DIAGNOSIS — M84.40XA INSUFFICIENCY FRACTURE: ICD-10-CM

## 2023-04-18 ENCOUNTER — TELEPHONE (OUTPATIENT)
Dept: BARIATRICS/WEIGHT MGMT | Age: 73
End: 2023-04-18

## 2023-04-22 NOTE — PROGRESS NOTES
is regular and unlabored, no acute distress     CV: The bilateral lower extremities are warm and well-perfused with brisk capillary refill. 2+ pulses LE bilateral.     ABDOMINAL: Nontender, nondistended     PSYCHIATRY: Pleasant mood, appropriate behavior, follows commands     NEURO: Sensation is intact distally with light touch with no alteration. Motor exam of the lower extremities show quadriceps, hamstrings, foot dorsiflexion and plantarflexion grossly intact 5/5. LYMPH: No lymphedema present distally in upper or lower extremity. MUSCULOSKELETAL:     Left knee Exam:     mild effusion noted. No erythema/induration/fluctuance. Posterior medial joint line TTP. Stable to varus and valgus at 0 and 30 degrees of flexion. Negative Lachman's and posterior drawer. Negative patellar grind test and J sign. Compartments soft and compressible throughout leg. Active range of motion 0-120 with pain. Good improvement since last visit     Imaging:  XR KNEE LEFT (3 VIEWS)     Result Date: 10/14/2022  EXAMINATION: 4 XRAY VIEWS OF THE LEFT TIBIA AND FIBULA; THREE XRAY VIEWS OF THE LEFT KNEE 10/14/2022 10:14 am COMPARISON: 05/10/2021 HISTORY: ORDERING SYSTEM PROVIDED HISTORY: r/o fx TECHNOLOGIST PROVIDED HISTORY: Reason for exam:->r/o fx FINDINGS: There is subchondral sclerosis and severe compartmental narrowing medially and laterally about the knee joint. There is a fracture of the medial tibial plateau which may be chronic. Ankle mortise is intact but there are small anterior posterior spurring at the tibiotalar articulation. There are degenerative changes at the patellofemoral articulation particularly laterally. There is a small joint effusion. Significant degenerative changes at the knee joint. There appears to be a chronic fracture of the medial tibial plateau.   It was suggested on the study 05/10/2021 but appears to be somewhat more displaced      XR TIBIA FIBULA LEFT (2 VIEWS)     Result Date:

## 2023-05-12 ENCOUNTER — TELEPHONE (OUTPATIENT)
Dept: BARIATRICS/WEIGHT MGMT | Age: 73
End: 2023-05-12

## 2023-06-01 RX ORDER — LISINOPRIL 20 MG/1
TABLET ORAL
Qty: 180 TABLET | Refills: 1 | Status: SHIPPED | OUTPATIENT
Start: 2023-06-01

## 2023-06-15 ENCOUNTER — OFFICE VISIT (OUTPATIENT)
Dept: ORTHOPEDIC SURGERY | Age: 73
End: 2023-06-15

## 2023-06-15 DIAGNOSIS — M17.11 PRIMARY OSTEOARTHRITIS OF RIGHT KNEE: Primary | ICD-10-CM

## 2023-09-18 ENCOUNTER — OFFICE VISIT (OUTPATIENT)
Dept: ORTHOPEDIC SURGERY | Age: 73
End: 2023-09-18
Payer: MEDICARE

## 2023-09-18 DIAGNOSIS — M17.12 PRIMARY OSTEOARTHRITIS OF LEFT KNEE: ICD-10-CM

## 2023-09-18 DIAGNOSIS — M17.11 PRIMARY OSTEOARTHRITIS OF RIGHT KNEE: Primary | ICD-10-CM

## 2023-09-18 PROCEDURE — 20610 DRAIN/INJ JOINT/BURSA W/O US: CPT | Performed by: ORTHOPAEDIC SURGERY

## 2023-09-18 PROCEDURE — 99999 PR OFFICE/OUTPT VISIT,PROCEDURE ONLY: CPT | Performed by: ORTHOPAEDIC SURGERY

## 2023-09-18 NOTE — PROGRESS NOTES
Chief Complaint   Patient presents with    Knee Pain     Zilretta b/l        I will proceed with a cortisone injection in the Bilateral knee. Verbal and written consent was obtained for the injections. The skin was prepped with alcohol. A prepared mixture of 32 mg of Zilretta and 5mL diluent was injected to Bilateral knee. The injection was given through the lateral side of the knee. The patient tolerated the injection well. I will see the patient back prn. Jesse Man was seen today for knee pain.     Diagnoses and all orders for this visit:    Primary osteoarthritis of right knee    Primary osteoarthritis of left knee

## 2023-11-21 NOTE — PROGRESS NOTES
Chief Complaint   Patient presents with    Knee Pain     Left knee tibial plateau fx f/u doing well, able to walk          HPI:    Patient is 74 y.o. female complaining chronic, atraumatic, insidious onset left knee pain for several weeks and was found to have a tibial plateau insuffiencey fracture. She admits to stiffness, deep, aching pain, swelling, difficulty with stairs and ambulating far distances. She admits to gross instability specifically in her Left knee. Previous treatments include rest, ice, and anti-inflammatory medication and HEP without much relief. She is currently in SNF and using a knee immobilizer. On follow-up today, patient is doing better overall. She is maintain the immobilizer since she is still having issues obtaining functional adjustable brace from nursing home. However she has been doing well with physical therapy with progressive weightbearing as tolerated. ROS:    Skin: (-) rash,(-) psoriasis,(-) eczema, (-)skin cancer. Neurologic: (-)numbness, (-)tingling, (-)headaches, (-) LOC. Cardiovascular: (-) Chest pain, (-) swelling in legs/feet, (-) SOB, (-) cramping in legs/feet with walking.     All other review of systems negative except stated above or in HPI      Past Medical History:   Diagnosis Date    Heart murmur     Hypertension     Lymphedema     Metabolic syndrome     Osteoarthritis     Osteopenia     Pinched nerve      Past Surgical History:   Procedure Laterality Date    ABDOMEN SURGERY  1984    VEIN SURGERY         Current Outpatient Medications:     aspirin EC 81 MG EC tablet, Take 1 tablet by mouth in the morning and at bedtime, Disp: 30 tablet, Rfl: 0    b complex vitamins capsule, Take 1 capsule by mouth Daily with supper, Disp: , Rfl:     vitamin B-12 (CYANOCOBALAMIN) 1000 MCG tablet, Take 1,000 mcg by mouth daily, Disp: , Rfl:     calcium carbonate 1500 (600 Ca) MG TABS tablet, Take 600 mg by mouth daily, Disp: , Rfl:     Omega-3 Fatty Acids (FISH OIL) 1000 MG [facility] Daily Treatment Note      Number of patients in group today: 9    From 0800-09:00, Pt participated in breakfast and a social skills group; Pt was socially appropriate, cooperative, participated in introducing themselves, as well as playing a group card game. Due to new group members joining, pts reviewed group rules and expectations.     From 09:00 to 11:30 the PT engaged in a process group where they rated their mood a 5 on a scale from 1 through 10 where 10 is the highest rating.    He presented with euthymic mood and affect.  The pt stated that this is his first time in therapy and he is still trying to figure out his thoughts around being here. He described that coming to treatment was both his idea and also encouraged by his dad. Noted that he struggles with his emotions since losing his mom around 2 years ago. When asked further details around moms death the pt stated \"I don't really want to say.\" Noted that he has 2 younger sisters who are 7 and 8 years old. Stated that his mom and dad were  and that his dad with dad is \"alright.\" The pt is a freshman at [employer] and described struggling with teachers and grades. However, did state that he has some friends at school and 1 close friend who he met there. Described enjoying playing basketball although he is not currently on an organized team but will play pickup games once in a while.   Pt denied any current safety concerns.     Goal for treatment:  \"be happier when I leave\"    From 11:30 to 12:30 Pt attended lunch, Pt was appropriate and interactive during lunch time while they ate lunch and watched a movie.      From 12:30 to 13:30 Patients participated in a group focused on identifying and coping with stressors. Patients first read through a handout that defined what stress is, common misunderstandings about stress, and positive and negative stress reactions. Patients then engaged in a discussion about  capsule, Take 2,000 mg by mouth 2 times daily, Disp: , Rfl:     Cholecalciferol (VITAMIN D) 50 MCG (2000 UT) CAPS capsule, Take 2,000 Units by mouth in the morning and at bedtime, Disp: , Rfl:     Garlic 2967 MG CAPS, Take 3,000 mg by mouth in the morning and at bedtime, Disp: , Rfl:     cod liver oil CAPS, Take 2 capsules by mouth in the morning and at bedtime, Disp: , Rfl:     lisinopril (PRINIVIL;ZESTRIL) 20 MG tablet, Take 20 mg by mouth 2 times daily, Disp: , Rfl:     Multiple Vitamins-Minerals (CENTRUM PO), Take 1 tablet by mouth daily, Disp: , Rfl:   No Known Allergies  Social History     Socioeconomic History    Marital status: Single     Spouse name: Not on file    Number of children: Not on file    Years of education: Not on file    Highest education level: Not on file   Occupational History    Not on file   Tobacco Use    Smoking status: Former    Smokeless tobacco: Never   Vaping Use    Vaping Use: Never used   Substance and Sexual Activity    Alcohol use: Yes     Comment: social    Drug use: No    Sexual activity: Not Currently     Partners: Male   Other Topics Concern    Not on file   Social History Narrative    Not on file     Social Determinants of Health     Financial Resource Strain: Not on file   Food Insecurity: Not on file   Transportation Needs: Not on file   Physical Activity: Not on file   Stress: Not on file   Social Connections: Not on file   Intimate Partner Violence: Not on file   Housing Stability: Not on file     Family History   Problem Relation Age of Onset    Heart Failure Mother     Alzheimer's Disease Father     Anemia Maternal Grandmother     Diabetes Paternal Grandmother            Physical Exam:    Temp 80 °F (36.7 °C)   Ht 5' 1\" (1.549 m)   Wt 225 lb 4.8 oz (102.2 kg)   BMI 42.57 kg/m²     GENERAL: alert, appears stated age, cooperative, no acute distress    HEENT: Head is normocephalic, atraumatic. PERRLA. SKIN: Clean, dry, intact.  There is not any cellulitis or internal versus external stressors and what they can and cannot control. Patients were then asked to identify stressors in their home life, social life, work/school, and other areas and participated in a discussion about what these stressors are and how they cope with them.       From 13:30 to 14:00 Pt participated in a goal setting group.   Pt expressed no safety concerns about returning home.     Nicole Laboy, APSW    11/21/2023   cutaneous lesions noted in the lower extremities     PULMONARY: breathing is regular and unlabored, no acute distress     CV: The bilateral lower extremities are warm and well-perfused with brisk capillary refill. 2+ pulses LE bilateral.     ABDOMINAL: Nontender, nondistended     PSYCHIATRY: Pleasant mood, appropriate behavior, follows commands     NEURO: Sensation is intact distally with light touch with no alteration. Motor exam of the lower extremities show quadriceps, hamstrings, foot dorsiflexion and plantarflexion grossly intact 5/5. LYMPH: No lymphedema present distally in upper or lower extremity. MUSCULOSKELETAL:    Left knee Exam:    mild effusion noted. No erythema/induration/fluctuance. Posterior medial joint line TTP. Stable to varus and valgus at 0 and 30 degrees of flexion. Negative Lachman's and posterior drawer. Negative patellar grind test and J sign. Compartments soft and compressible throughout leg. Active range of motion 0-120 with pain. Mild improvement since last visit    Imaging:  XR KNEE LEFT (3 VIEWS)    Result Date: 10/14/2022  EXAMINATION: 4 XRAY VIEWS OF THE LEFT TIBIA AND FIBULA; THREE XRAY VIEWS OF THE LEFT KNEE 10/14/2022 10:14 am COMPARISON: 05/10/2021 HISTORY: ORDERING SYSTEM PROVIDED HISTORY: r/o fx TECHNOLOGIST PROVIDED HISTORY: Reason for exam:->r/o fx FINDINGS: There is subchondral sclerosis and severe compartmental narrowing medially and laterally about the knee joint. There is a fracture of the medial tibial plateau which may be chronic. Ankle mortise is intact but there are small anterior posterior spurring at the tibiotalar articulation. There are degenerative changes at the patellofemoral articulation particularly laterally. There is a small joint effusion. Significant degenerative changes at the knee joint. There appears to be a chronic fracture of the medial tibial plateau.   It was suggested on the study 05/10/2021 but appears to be somewhat more displaced     XR TIBIA FIBULA LEFT (2 VIEWS)    Result Date: 10/14/2022  EXAMINATION: 4 XRAY VIEWS OF THE LEFT TIBIA AND FIBULA; THREE XRAY VIEWS OF THE LEFT KNEE 10/14/2022 10:14 am COMPARISON: 05/10/2021 HISTORY: ORDERING SYSTEM PROVIDED HISTORY: r/o fx TECHNOLOGIST PROVIDED HISTORY: Reason for exam:->r/o fx FINDINGS: There is subchondral sclerosis and severe compartmental narrowing medially and laterally about the knee joint. There is a fracture of the medial tibial plateau which may be chronic. Ankle mortise is intact but there are small anterior posterior spurring at the tibiotalar articulation. There are degenerative changes at the patellofemoral articulation particularly laterally. There is a small joint effusion. Significant degenerative changes at the knee joint. There appears to be a chronic fracture of the medial tibial plateau. It was suggested on the study 05/10/2021 but appears to be somewhat more displaced     CT KNEE LEFT WO CONTRAST    Result Date: 10/14/2022  EXAMINATION: CT OF THE LEFT KNEE WITHOUT CONTRAST 10/14/2022 1:58 pm TECHNIQUE: CT of the left knee was performed without the administration of intravenous contrast.  Multiplanar reformatted images are provided for review. Automated exposure control, iterative reconstruction, and/or weight based adjustment of the mA/kV was utilized to reduce the radiation dose to as low as reasonably achievable. COMPARISON: None. HISTORY ORDERING SYSTEM PROVIDED HISTORY: tibial plateau TECHNOLOGIST PROVIDED HISTORY: Reason for exam:->tibial plateau Decision Support Exception - unselect if not a suspected or confirmed emergency medical condition->Emergency Medical Condition (MA) FINDINGS: Bones: There is a vertically oriented fracture involving the central to lateral aspect of the medial tibial plateau. There is a small vertically oriented fracture involving the lateral tibial plateau peripherally. Severe osteoarthritis. Small knee joint effusion. Because there fractures involving the medial and lateral tibial plateau, the findings technically represent a Schatzker classification 5 fracture, the lateral tibial plateau fracture is small and peripheral. Soft Tissue: No abnormal mass or fluid collection. Joint: Severe osteoarthritis. Patella appears to be in normal position. Moderate to severe osteophytosis of all 3 knee joint compartments. 1.  Fractures of medial and lateral tibial plateau 2. Small knee joint effusion and severe tricompartmental osteoarthritis. XR KNEE LEFT (1-2 VIEWS)    Result Date: 10/26/2022  EXAMINATION: TWO XRAY VIEWS OF THE LEFT KNEE 10/25/2022 2:09 pm COMPARISON: 14 October 2022 HISTORY: ORDERING SYSTEM PROVIDED HISTORY: Primary osteoarthritis of left knee FINDINGS: There is again noted fragmentation at the medial tibial plateau with increasing deformity. A lateral tibial plateau fracture is no longer clearly evident. Degenerative arthropathy appears somewhat progressive at the medial weight-bearing compartment and is relatively stable elsewhere. Unremarkable soft tissues. Increasing deformity associated with medial tibial plateau fracture. No clear redemonstration of lateral tibial plateau fracture. Pronounced tricompartmental osteoarthritis. XR KNEE LEFT (1-2 VIEWS)    Result Date: 11/8/2022  EXAMINATION: TWO XRAY VIEWS OF THE LEFT KNEE 11/8/2022 2:21 pm COMPARISON: X-ray dated 10/25/2022 HISTORY: ORDERING SYSTEM PROVIDED HISTORY: Closed fracture of left tibial plateau, initial encounter FINDINGS: Similar alignment of proximal medial tibial plateau fracture and irregularity of chronic deformity with background moderate to severe DJD of tricompartmental involvement and joint space narrowing. No progressive malalignment.   Nondisplaced proximal fibular fracture similar to prior     No significant change in alignment from 10/25/2022 comparison of the medial tibial plateau fracture and chronic deformity along with intermixed DJD. Stable nondisplaced fracture of the proximal fibula. XR KNEE LEFT (1-2 VIEWS)    Result Date: 11/22/2022  EXAMINATION: TWO XRAY VIEWS OF THE LEFT KNEE 11/22/2022 12:56 pm COMPARISON: 8 November 2022 HISTORY: ORDERING SYSTEM PROVIDED HISTORY: Closed fracture of left tibial plateau, initial encounter FINDINGS: Stable alignment of medial tibial plateau fracture with no new callus formation. Severe degenerative arthropathy at all 3 compartments. No new abnormal bone or soft tissue findings. Unchanged appearance of medial tibial plateau fracture. Severe degenerative arthropathy as before. XR KNEE LEFT (1-2 VIEWS)    Result Date: 12/14/2022  EXAMINATION: TWO XRAY VIEWS OF THE LEFT KNEE 12/13/2022 12:20 pm COMPARISON: 22 November 2022 HISTORY: ORDERING SYSTEM PROVIDED HISTORY: Closed fracture of left tibial plateau, initial encounter FINDINGS: Stable alignment of medial tibial plateau fracture. Very severe tricompartmental left knee osteoarthritis as before. Small knee joint effusion as before. Unchanged appearance of medial tibial plateau fracture and very severe osteoarthritis. Very small knee joint effusion as before. XR KNEE LEFT (1-2 VIEWS)    Result Date: 12/28/2022  EXAMINATION: TWO XRAY VIEWS OF THE LEFT KNEE 12/27/2022 2:41 pm COMPARISON: X-ray dated 12/13/2022 HISTORY: ORDERING SYSTEM PROVIDED HISTORY: Closed fracture of left tibial plateau, initial encounter FINDINGS: Similar appearance of medial tibial plateau fracture deformity and chronic involvement of severe DJD including joint space loss and osteophytosis with unchanged configuration to prior. No progressive malalignment or acute fracture otherwise. Stable alignment of chronic medial tibial plateau fracture and deformity along with severe DJD of the left knee               Earl Mcgovern was seen today for knee pain.     Diagnoses and all orders for this visit:    Closed fracture of left tibial plateau, initial encounter    Insufficiency fracture              Patient seen and examined. Imaging and CT reviewed with patient in detail. Natural history and course discussed with patient in long discussion  Treatment options discussed with patient in detail including risks and benefits. Patient should do well with conservative management as patient would like to avoid surgery at this time. Patient will continue nonweightbearing but will be provided an adjustable knee brace at this time. She is weightbearing as tolerated. Continue with rehab and physical therapy at skilled nursing facility for range of motion exercises, ADLs, and pain control. Follow-up in 2 to 4 weeks for recheck and new x-rays.           Rachelle So DO

## 2023-12-07 RX ORDER — LISINOPRIL 20 MG/1
TABLET ORAL
Qty: 180 TABLET | Refills: 1 | Status: SHIPPED | OUTPATIENT
Start: 2023-12-07

## 2023-12-13 ENCOUNTER — TELEPHONE (OUTPATIENT)
Dept: INTERNAL MEDICINE CLINIC | Age: 73
End: 2023-12-13

## 2023-12-13 RX ORDER — METHYLPREDNISOLONE 4 MG/1
TABLET ORAL
Qty: 1 KIT | Refills: 0 | Status: SHIPPED | OUTPATIENT
Start: 2023-12-13 | End: 2023-12-19

## 2023-12-13 NOTE — TELEPHONE ENCOUNTER
Last Appointment:  Visit date not found  Future Appointments   Date Time Provider [address]   12/19/2023  9:50 AM DO Sagrario Dorantes [place]

## 2023-12-13 NOTE — TELEPHONE ENCOUNTER
Dr Judah Kanner,    Patient has been housebound and not able to get out of the house at the moment due to not being able to drive. She was [facility] out of [facility] after being there for 2/12 mos and she states that her low back / sciatic pain is flaring up again and would you refill a pred pack or something for the pain? She will set up justin't when she can get arrangements through her insurance for transportation as soon as possible.       Please advise and thank you

## 2024-01-02 ENCOUNTER — APPOINTMENT (OUTPATIENT)
Dept: GENERAL RADIOLOGY | Age: 74
DRG: 436 | End: 2024-01-02
Payer: MEDICARE

## 2024-01-02 ENCOUNTER — HOSPITAL ENCOUNTER (INPATIENT)
Age: 74
LOS: 6 days | Discharge: NURSING, REHAB OR LONG-TERM CARE | DRG: 436 | End: 2024-01-08
Attending: EMERGENCY MEDICINE | Admitting: INTERNAL MEDICINE
Payer: MEDICARE

## 2024-01-02 ENCOUNTER — APPOINTMENT (OUTPATIENT)
Dept: CT IMAGING | Age: 74
DRG: 436 | End: 2024-01-02
Payer: MEDICARE

## 2024-01-02 ENCOUNTER — APPOINTMENT (OUTPATIENT)
Dept: ULTRASOUND IMAGING | Age: 74
DRG: 436 | End: 2024-01-02
Payer: MEDICARE

## 2024-01-02 DIAGNOSIS — M54.32 SCIATICA OF LEFT SIDE: ICD-10-CM

## 2024-01-02 DIAGNOSIS — K85.90 ACUTE PANCREATITIS, UNSPECIFIED COMPLICATION STATUS, UNSPECIFIED PANCREATITIS TYPE: ICD-10-CM

## 2024-01-02 DIAGNOSIS — S82.891A CLOSED FRACTURE OF RIGHT ANKLE, INITIAL ENCOUNTER: ICD-10-CM

## 2024-01-02 DIAGNOSIS — C25.0 MALIGNANT NEOPLASM OF HEAD OF PANCREAS (HCC): ICD-10-CM

## 2024-01-02 DIAGNOSIS — K86.89 PANCREATIC MASS: Primary | ICD-10-CM

## 2024-01-02 PROBLEM — C25.9 PANCREATIC CANCER (HCC): Status: ACTIVE | Noted: 2024-01-02

## 2024-01-02 LAB
ALBUMIN SERPL-MCNC: 4.1 G/DL (ref 3.5–5.2)
ALP SERPL-CCNC: 90 U/L (ref 35–104)
ALT SERPL-CCNC: 21 U/L (ref 0–32)
ANION GAP SERPL CALCULATED.3IONS-SCNC: 15 MMOL/L (ref 7–16)
AST SERPL-CCNC: 35 U/L (ref 0–31)
BASOPHILS # BLD: 0.04 K/UL (ref 0–0.2)
BASOPHILS NFR BLD: 0 % (ref 0–2)
BILIRUB SERPL-MCNC: 0.5 MG/DL (ref 0–1.2)
BUN SERPL-MCNC: 24 MG/DL (ref 6–23)
CALCIUM SERPL-MCNC: 9.7 MG/DL (ref 8.6–10.2)
CHLORIDE SERPL-SCNC: 106 MMOL/L (ref 98–107)
CO2 SERPL-SCNC: 18 MMOL/L (ref 22–29)
CREAT SERPL-MCNC: 0.6 MG/DL (ref 0.5–1)
EOSINOPHIL # BLD: 0.1 K/UL (ref 0.05–0.5)
EOSINOPHILS RELATIVE PERCENT: 1 % (ref 0–6)
ERYTHROCYTE [DISTWIDTH] IN BLOOD BY AUTOMATED COUNT: 12.8 % (ref 11.5–15)
GFR SERPL CREATININE-BSD FRML MDRD: >60 ML/MIN/1.73M2
GLUCOSE SERPL-MCNC: 102 MG/DL (ref 74–99)
HCT VFR BLD AUTO: 40.5 % (ref 34–48)
HGB BLD-MCNC: 13.4 G/DL (ref 11.5–15.5)
IMM GRANULOCYTES # BLD AUTO: 0.03 K/UL (ref 0–0.58)
IMM GRANULOCYTES NFR BLD: 0 % (ref 0–5)
LIPASE SERPL-CCNC: 1618 U/L (ref 13–60)
LYMPHOCYTES NFR BLD: 1.62 K/UL (ref 1.5–4)
LYMPHOCYTES RELATIVE PERCENT: 18 % (ref 20–42)
MCH RBC QN AUTO: 31.5 PG (ref 26–35)
MCHC RBC AUTO-ENTMCNC: 33.1 G/DL (ref 32–34.5)
MCV RBC AUTO: 95.1 FL (ref 80–99.9)
MONOCYTES NFR BLD: 0.58 K/UL (ref 0.1–0.95)
MONOCYTES NFR BLD: 6 % (ref 2–12)
NEUTROPHILS NFR BLD: 74 % (ref 43–80)
NEUTS SEG NFR BLD: 6.67 K/UL (ref 1.8–7.3)
PLATELET # BLD AUTO: 216 K/UL (ref 130–450)
PMV BLD AUTO: 9.1 FL (ref 7–12)
POTASSIUM SERPL-SCNC: 5 MMOL/L (ref 3.5–5)
PROT SERPL-MCNC: 7.8 G/DL (ref 6.4–8.3)
RBC # BLD AUTO: 4.26 M/UL (ref 3.5–5.5)
SODIUM SERPL-SCNC: 139 MMOL/L (ref 132–146)
WBC OTHER # BLD: 9 K/UL (ref 4.5–11.5)

## 2024-01-02 PROCEDURE — 96374 THER/PROPH/DIAG INJ IV PUSH: CPT

## 2024-01-02 PROCEDURE — 85025 COMPLETE CBC W/AUTO DIFF WBC: CPT

## 2024-01-02 PROCEDURE — 6360000002 HC RX W HCPCS: Performed by: INTERNAL MEDICINE

## 2024-01-02 PROCEDURE — 80053 COMPREHEN METABOLIC PANEL: CPT

## 2024-01-02 PROCEDURE — 93971 EXTREMITY STUDY: CPT

## 2024-01-02 PROCEDURE — 2580000003 HC RX 258: Performed by: INTERNAL MEDICINE

## 2024-01-02 PROCEDURE — 73610 X-RAY EXAM OF ANKLE: CPT

## 2024-01-02 PROCEDURE — 6360000004 HC RX CONTRAST MEDICATION: Performed by: RADIOLOGY

## 2024-01-02 PROCEDURE — 83690 ASSAY OF LIPASE: CPT

## 2024-01-02 PROCEDURE — 1200000000 HC SEMI PRIVATE

## 2024-01-02 PROCEDURE — 6360000002 HC RX W HCPCS: Performed by: EMERGENCY MEDICINE

## 2024-01-02 PROCEDURE — 99285 EMERGENCY DEPT VISIT HI MDM: CPT

## 2024-01-02 PROCEDURE — C9113 INJ PANTOPRAZOLE SODIUM, VIA: HCPCS | Performed by: INTERNAL MEDICINE

## 2024-01-02 PROCEDURE — 74177 CT ABD & PELVIS W/CONTRAST: CPT

## 2024-01-02 PROCEDURE — 73502 X-RAY EXAM HIP UNI 2-3 VIEWS: CPT

## 2024-01-02 PROCEDURE — 6370000000 HC RX 637 (ALT 250 FOR IP): Performed by: INTERNAL MEDICINE

## 2024-01-02 PROCEDURE — 72131 CT LUMBAR SPINE W/O DYE: CPT

## 2024-01-02 PROCEDURE — 96372 THER/PROPH/DIAG INJ SC/IM: CPT

## 2024-01-02 RX ORDER — HYDROCODONE BITARTRATE AND ACETAMINOPHEN 5; 325 MG/1; MG/1
1 TABLET ORAL EVERY 6 HOURS PRN
Status: DISCONTINUED | OUTPATIENT
Start: 2024-01-02 | End: 2024-01-08 | Stop reason: HOSPADM

## 2024-01-02 RX ORDER — ENOXAPARIN SODIUM 100 MG/ML
40 INJECTION SUBCUTANEOUS DAILY
Status: DISCONTINUED | OUTPATIENT
Start: 2024-01-02 | End: 2024-01-02 | Stop reason: SDUPTHER

## 2024-01-02 RX ORDER — ONDANSETRON 2 MG/ML
4 INJECTION INTRAMUSCULAR; INTRAVENOUS EVERY 6 HOURS PRN
Status: DISCONTINUED | OUTPATIENT
Start: 2024-01-02 | End: 2024-01-08 | Stop reason: HOSPADM

## 2024-01-02 RX ORDER — POTASSIUM CHLORIDE 7.45 MG/ML
10 INJECTION INTRAVENOUS PRN
Status: DISCONTINUED | OUTPATIENT
Start: 2024-01-02 | End: 2024-01-08 | Stop reason: HOSPADM

## 2024-01-02 RX ORDER — MORPHINE SULFATE 2 MG/ML
2 INJECTION, SOLUTION INTRAMUSCULAR; INTRAVENOUS EVERY 4 HOURS PRN
Status: DISCONTINUED | OUTPATIENT
Start: 2024-01-02 | End: 2024-01-08 | Stop reason: HOSPADM

## 2024-01-02 RX ORDER — LISINOPRIL 20 MG/1
20 TABLET ORAL 2 TIMES DAILY
Status: DISCONTINUED | OUTPATIENT
Start: 2024-01-02 | End: 2024-01-06

## 2024-01-02 RX ORDER — ASPIRIN 81 MG/1
81 TABLET ORAL 2 TIMES DAILY
Status: DISCONTINUED | OUTPATIENT
Start: 2024-01-02 | End: 2024-01-08 | Stop reason: HOSPADM

## 2024-01-02 RX ORDER — FENTANYL CITRATE 0.05 MG/ML
25 INJECTION, SOLUTION INTRAMUSCULAR; INTRAVENOUS ONCE
Status: COMPLETED | OUTPATIENT
Start: 2024-01-02 | End: 2024-01-02

## 2024-01-02 RX ORDER — ENOXAPARIN SODIUM 100 MG/ML
30 INJECTION SUBCUTANEOUS 2 TIMES DAILY
Status: DISCONTINUED | OUTPATIENT
Start: 2024-01-02 | End: 2024-01-08 | Stop reason: HOSPADM

## 2024-01-02 RX ORDER — KETOROLAC TROMETHAMINE 30 MG/ML
30 INJECTION, SOLUTION INTRAMUSCULAR; INTRAVENOUS ONCE
Status: DISCONTINUED | OUTPATIENT
Start: 2024-01-02 | End: 2024-01-02

## 2024-01-02 RX ORDER — SODIUM CHLORIDE 9 MG/ML
INJECTION, SOLUTION INTRAVENOUS CONTINUOUS
Status: DISCONTINUED | OUTPATIENT
Start: 2024-01-02 | End: 2024-01-06

## 2024-01-02 RX ORDER — POTASSIUM CHLORIDE 20 MEQ/1
40 TABLET, EXTENDED RELEASE ORAL PRN
Status: DISCONTINUED | OUTPATIENT
Start: 2024-01-02 | End: 2024-01-08 | Stop reason: HOSPADM

## 2024-01-02 RX ORDER — PANTOPRAZOLE SODIUM 40 MG/10ML
40 INJECTION, POWDER, LYOPHILIZED, FOR SOLUTION INTRAVENOUS DAILY
Status: DISCONTINUED | OUTPATIENT
Start: 2024-01-02 | End: 2024-01-05

## 2024-01-02 RX ORDER — KETOROLAC TROMETHAMINE 30 MG/ML
30 INJECTION, SOLUTION INTRAMUSCULAR; INTRAVENOUS ONCE
Status: COMPLETED | OUTPATIENT
Start: 2024-01-02 | End: 2024-01-02

## 2024-01-02 RX ORDER — KETOROLAC TROMETHAMINE 15 MG/ML
15 INJECTION, SOLUTION INTRAMUSCULAR; INTRAVENOUS ONCE
Status: DISCONTINUED | OUTPATIENT
Start: 2024-01-02 | End: 2024-01-02

## 2024-01-02 RX ORDER — MAGNESIUM SULFATE IN WATER 40 MG/ML
2000 INJECTION, SOLUTION INTRAVENOUS PRN
Status: DISCONTINUED | OUTPATIENT
Start: 2024-01-02 | End: 2024-01-08 | Stop reason: HOSPADM

## 2024-01-02 RX ADMIN — ASPIRIN 81 MG: 81 TABLET, COATED ORAL at 22:33

## 2024-01-02 RX ADMIN — PANTOPRAZOLE SODIUM 40 MG: 40 INJECTION, POWDER, FOR SOLUTION INTRAVENOUS at 22:34

## 2024-01-02 RX ADMIN — KETOROLAC TROMETHAMINE 30 MG: 30 INJECTION INTRAMUSCULAR; INTRAVENOUS at 13:42

## 2024-01-02 RX ADMIN — MORPHINE SULFATE 2 MG: 2 INJECTION, SOLUTION INTRAMUSCULAR; INTRAVENOUS at 22:33

## 2024-01-02 RX ADMIN — FENTANYL CITRATE 25 MCG: 0.05 INJECTION, SOLUTION INTRAMUSCULAR; INTRAVENOUS at 19:25

## 2024-01-02 RX ADMIN — LISINOPRIL 20 MG: 20 TABLET ORAL at 23:30

## 2024-01-02 RX ADMIN — SODIUM CHLORIDE: 9 INJECTION, SOLUTION INTRAVENOUS at 23:26

## 2024-01-02 RX ADMIN — IOPAMIDOL 75 ML: 755 INJECTION, SOLUTION INTRAVENOUS at 16:49

## 2024-01-02 ASSESSMENT — LIFESTYLE VARIABLES
HOW OFTEN DO YOU HAVE A DRINK CONTAINING ALCOHOL: NEVER
HOW MANY STANDARD DRINKS CONTAINING ALCOHOL DO YOU HAVE ON A TYPICAL DAY: PATIENT DOES NOT DRINK

## 2024-01-02 ASSESSMENT — PAIN DESCRIPTION - LOCATION
LOCATION: LEG
LOCATION: BACK;HIP;LEG

## 2024-01-02 ASSESSMENT — PAIN SCALES - GENERAL
PAINLEVEL_OUTOF10: 9
PAINLEVEL_OUTOF10: 7
PAINLEVEL_OUTOF10: 7

## 2024-01-02 ASSESSMENT — PAIN DESCRIPTION - ORIENTATION
ORIENTATION: LEFT
ORIENTATION: LEFT

## 2024-01-02 ASSESSMENT — PAIN DESCRIPTION - DESCRIPTORS: DESCRIPTORS: ACHING;TIGHTNESS;SPASM

## 2024-01-02 ASSESSMENT — PAIN - FUNCTIONAL ASSESSMENT: PAIN_FUNCTIONAL_ASSESSMENT: PREVENTS OR INTERFERES SOME ACTIVE ACTIVITIES AND ADLS

## 2024-01-02 ASSESSMENT — PAIN DESCRIPTION - ONSET: ONSET: ON-GOING

## 2024-01-02 ASSESSMENT — PAIN DESCRIPTION - PAIN TYPE: TYPE: ACUTE PAIN

## 2024-01-02 ASSESSMENT — PAIN DESCRIPTION - FREQUENCY: FREQUENCY: CONTINUOUS

## 2024-01-02 NOTE — ED PROVIDER NOTES
ED PROVIDER NOTE    Chief Complaint   Patient presents with    Back Pain     Started month ago, hx of sciatica pain, getting worse    Ankle Pain     Pain and swelling in R ankle, trouble walking        HPI:  1/2/24,   Time: 12:22 PM ROGE Lewis is a 73 y.o. female presenting to the ED for Left hip, left low back, and right ankle pain.  Left-sided low back pain ongoing for the last several weeks, radiates to the left posterior thigh, worse with movement.  No associated numbness or tingling in the extremities.  Has history of degenerative lumbar disease.  Recently treated by her PCP with a course of prednisone which she finished 2 weeks ago.  Since then she has noticed progressively worsening swelling of her right ankle and right lower extremity.  Has a history of lymphedema, however the right leg is much more swollen than usual.  Associated pain of the right ankle.  Has home health care at home.  Still able to ambulate with a walker but is more difficult due to weakness of left lower extremity.  No recent falls or injuries.  No associated fever, chills, cough, chest pain, shortness of breath, abdominal pain, nausea, vomiting, diarrhea.  No changes in bowel or bladder function.  No recent spinal injections.  No anticoagulant use.  No history of injection drug use.    Chart review: hx of OA, lymphedema, HTN    Reviewed outpatient ortho progress note from 12/19/23 by Dr. Mckeon:  Dx primary OA of bilateral knees--triamcinolone intra-articular injection      Review of Systems:     Review of Systems  Pertinent positives and negatives as stated in HPI     --------------------------------------------- PAST HISTORY ---------------------------------------------  Past Medical History:   Past Medical History:   Diagnosis Date    Heart murmur     Hypertension     Lymphedema     Metabolic syndrome     Osteoarthritis     Osteopenia     Pinched nerve        Past Surgical History:   Past Surgical History:   Procedure

## 2024-01-03 ENCOUNTER — APPOINTMENT (OUTPATIENT)
Dept: MRI IMAGING | Age: 74
DRG: 436 | End: 2024-01-03
Payer: MEDICARE

## 2024-01-03 ENCOUNTER — APPOINTMENT (OUTPATIENT)
Dept: CT IMAGING | Age: 74
DRG: 436 | End: 2024-01-03
Payer: MEDICARE

## 2024-01-03 PROBLEM — S82.891A CLOSED FRACTURE OF RIGHT ANKLE: Status: ACTIVE | Noted: 2024-01-03

## 2024-01-03 PROBLEM — K86.89 PANCREATIC MASS: Status: ACTIVE | Noted: 2024-01-03

## 2024-01-03 PROBLEM — M54.32 SCIATICA OF LEFT SIDE: Status: ACTIVE | Noted: 2024-01-03

## 2024-01-03 LAB
CANCER AG125 SERPL-ACNC: 4 U/ML (ref 0–35)
CEA SERPL-MCNC: 1.5 NG/ML (ref 0–5.2)
CHOLEST SERPL-MCNC: 142 MG/DL
HDLC SERPL-MCNC: 43 MG/DL
LDLC SERPL CALC-MCNC: 82 MG/DL
TRIGL SERPL-MCNC: 84 MG/DL
TSH SERPL DL<=0.05 MIU/L-ACNC: 1.64 UIU/ML (ref 0.27–4.2)
VLDLC SERPL CALC-MCNC: 17 MG/DL

## 2024-01-03 PROCEDURE — 86304 IMMUNOASSAY TUMOR CA 125: CPT

## 2024-01-03 PROCEDURE — C9113 INJ PANTOPRAZOLE SODIUM, VIA: HCPCS | Performed by: INTERNAL MEDICINE

## 2024-01-03 PROCEDURE — 86301 IMMUNOASSAY TUMOR CA 19-9: CPT

## 2024-01-03 PROCEDURE — 2580000003 HC RX 258: Performed by: INTERNAL MEDICINE

## 2024-01-03 PROCEDURE — 71260 CT THORAX DX C+: CPT

## 2024-01-03 PROCEDURE — 99222 1ST HOSP IP/OBS MODERATE 55: CPT | Performed by: STUDENT IN AN ORGANIZED HEALTH CARE EDUCATION/TRAINING PROGRAM

## 2024-01-03 PROCEDURE — 80061 LIPID PANEL: CPT

## 2024-01-03 PROCEDURE — 1200000000 HC SEMI PRIVATE

## 2024-01-03 PROCEDURE — 6360000004 HC RX CONTRAST MEDICATION: Performed by: RADIOLOGY

## 2024-01-03 PROCEDURE — 6370000000 HC RX 637 (ALT 250 FOR IP): Performed by: INTERNAL MEDICINE

## 2024-01-03 PROCEDURE — 82378 CARCINOEMBRYONIC ANTIGEN: CPT

## 2024-01-03 PROCEDURE — 6360000002 HC RX W HCPCS: Performed by: INTERNAL MEDICINE

## 2024-01-03 PROCEDURE — A9577 INJ MULTIHANCE: HCPCS | Performed by: RADIOLOGY

## 2024-01-03 PROCEDURE — 74175 CTA ABDOMEN W/CONTRAST: CPT

## 2024-01-03 PROCEDURE — 84443 ASSAY THYROID STIM HORMONE: CPT

## 2024-01-03 PROCEDURE — 86316 IMMUNOASSAY TUMOR OTHER: CPT

## 2024-01-03 PROCEDURE — 76937 US GUIDE VASCULAR ACCESS: CPT

## 2024-01-03 PROCEDURE — 36415 COLL VENOUS BLD VENIPUNCTURE: CPT

## 2024-01-03 PROCEDURE — 74183 MRI ABD W/O CNTR FLWD CNTR: CPT

## 2024-01-03 PROCEDURE — 6370000000 HC RX 637 (ALT 250 FOR IP)

## 2024-01-03 RX ORDER — CYCLOBENZAPRINE HCL 5 MG
5 TABLET ORAL 3 TIMES DAILY PRN
Status: DISCONTINUED | OUTPATIENT
Start: 2024-01-03 | End: 2024-01-08 | Stop reason: HOSPADM

## 2024-01-03 RX ADMIN — IOPAMIDOL 75 ML: 755 INJECTION, SOLUTION INTRAVENOUS at 11:53

## 2024-01-03 RX ADMIN — LISINOPRIL 20 MG: 20 TABLET ORAL at 08:14

## 2024-01-03 RX ADMIN — CYCLOBENZAPRINE HYDROCHLORIDE 5 MG: 5 TABLET, FILM COATED ORAL at 08:03

## 2024-01-03 RX ADMIN — CYCLOBENZAPRINE HYDROCHLORIDE 5 MG: 5 TABLET, FILM COATED ORAL at 23:19

## 2024-01-03 RX ADMIN — SODIUM CHLORIDE: 9 INJECTION, SOLUTION INTRAVENOUS at 14:13

## 2024-01-03 RX ADMIN — GADOBENATE DIMEGLUMINE 20 ML: 529 INJECTION, SOLUTION INTRAVENOUS at 12:38

## 2024-01-03 RX ADMIN — ENOXAPARIN SODIUM 30 MG: 100 INJECTION SUBCUTANEOUS at 20:26

## 2024-01-03 RX ADMIN — PANTOPRAZOLE SODIUM 40 MG: 40 INJECTION, POWDER, FOR SOLUTION INTRAVENOUS at 08:04

## 2024-01-03 RX ADMIN — CYCLOBENZAPRINE HYDROCHLORIDE 5 MG: 5 TABLET, FILM COATED ORAL at 16:47

## 2024-01-03 RX ADMIN — LISINOPRIL 20 MG: 20 TABLET ORAL at 20:26

## 2024-01-03 ASSESSMENT — PAIN DESCRIPTION - LOCATION
LOCATION: GENERALIZED
LOCATION: LEG

## 2024-01-03 ASSESSMENT — PAIN SCALES - GENERAL
PAINLEVEL_OUTOF10: 6
PAINLEVEL_OUTOF10: 1
PAINLEVEL_OUTOF10: 2
PAINLEVEL_OUTOF10: 1
PAINLEVEL_OUTOF10: 0

## 2024-01-03 ASSESSMENT — PAIN DESCRIPTION - ORIENTATION: ORIENTATION: LEFT

## 2024-01-03 ASSESSMENT — PAIN - FUNCTIONAL ASSESSMENT: PAIN_FUNCTIONAL_ASSESSMENT: PREVENTS OR INTERFERES SOME ACTIVE ACTIVITIES AND ADLS

## 2024-01-03 ASSESSMENT — PAIN DESCRIPTION - DESCRIPTORS: DESCRIPTORS: ACHING;SORE

## 2024-01-03 ASSESSMENT — ENCOUNTER SYMPTOMS: BACK PAIN: 1

## 2024-01-03 NOTE — PLAN OF CARE
Problem: Discharge Planning  Goal: Discharge to home or other facility with appropriate resources  Outcome: Progressing     Problem: ABCDS Injury Assessment  Goal: Absence of physical injury  Outcome: Progressing     Problem: Safety - Adult  Goal: Free from fall injury  Outcome: Progressing     Problem: Pain  Goal: Verbalizes/displays adequate comfort level or baseline comfort level  Outcome: Progressing

## 2024-01-03 NOTE — H&P
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  Incidentally identified on CT lumbar spine imaging was the patient's pancreatic mass which is concerning for malignancy.  She has no family history of pancreatic cancer and has no concerning symptoms reported such as weight loss, night sweats jaundice or scleral icterus.  The hepatobiliary and advance GI team have been asked to provide consultation.  MRI of the abdomen with MRCP will be obtained for further evaluation.  CT chest will be performed tomorrow with contrast for staging workup.  Tumor markers have been ordered as well.  Updated recommendations as per the following consultants.  Also noted to have a subacute atraumatic right bimalleolar fracture.  Ortho has evaluated the patient and will be placing a well-padded 3 sided splint.  Symptom management, weightbearing and activity will be as per their service in this regard.  After further discussion with the patient she admits that she would not like to have any aggressive workup done in regards to invasive procedures, potentially cancer treatment including chemotherapy or radiation but is agreeable with the workup for information purposes.  She request to be a DNR CC and this has been ordered. Underlying co-morbidites will be addressed during hospitalization as well. Labs and vital signs will be monitored closely and addressed accordingly. See additional orders for details.     Due to high incidence and

## 2024-01-03 NOTE — PLAN OF CARE
Discussed patient with emergency department.  Per report patient had no known injury however has noted right ankle pain for over a week.  She has been ambulating on this ankle.   Plan for a well-padded 3 sided splint and nonweightbearing to the right lower extremity.  Patient is getting admitted for medical management.  From orthopedic standpoint this can be managed on outpatient basis.  Plan for outpatient follow-up in 1 week

## 2024-01-03 NOTE — PLAN OF CARE
Problem: Discharge Planning  Goal: Discharge to home or other facility with appropriate resources  1/3/2024 1032 by Doreen Lemus RN  Outcome: Progressing  1/2/2024 2258 by Erwin Gonzales RN  Outcome: Progressing     Problem: ABCDS Injury Assessment  Goal: Absence of physical injury  1/3/2024 1032 by Doreen Lemus RN  Outcome: Progressing  1/2/2024 2258 by Erwin Gonzales RN  Outcome: Progressing     Problem: Safety - Adult  Goal: Free from fall injury  1/3/2024 1032 by Doreen Lemus RN  Outcome: Progressing  1/2/2024 2258 by Erwin Gonzales RN  Outcome: Progressing     Problem: Pain  Goal: Verbalizes/displays adequate comfort level or baseline comfort level  1/3/2024 1032 by Doreen Lemus RN  Outcome: Progressing  1/2/2024 2258 by Erwin Gonzales RN  Outcome: Progressing

## 2024-01-04 LAB
ALBUMIN SERPL-MCNC: 3.2 G/DL (ref 3.5–5.2)
ALP SERPL-CCNC: 73 U/L (ref 35–104)
ALT SERPL-CCNC: 16 U/L (ref 0–32)
ANION GAP SERPL CALCULATED.3IONS-SCNC: 11 MMOL/L (ref 7–16)
AST SERPL-CCNC: 32 U/L (ref 0–31)
BASOPHILS # BLD: 0 K/UL (ref 0–0.2)
BASOPHILS NFR BLD: 0 % (ref 0–2)
BILIRUB SERPL-MCNC: 0.5 MG/DL (ref 0–1.2)
BUN SERPL-MCNC: 11 MG/DL (ref 6–23)
CALCIUM SERPL-MCNC: 8.7 MG/DL (ref 8.6–10.2)
CHLORIDE SERPL-SCNC: 108 MMOL/L (ref 98–107)
CO2 SERPL-SCNC: 19 MMOL/L (ref 22–29)
CREAT SERPL-MCNC: 0.6 MG/DL (ref 0.5–1)
EOSINOPHIL # BLD: 0 K/UL (ref 0.05–0.5)
EOSINOPHILS RELATIVE PERCENT: 0 % (ref 0–6)
ERYTHROCYTE [DISTWIDTH] IN BLOOD BY AUTOMATED COUNT: 12.6 % (ref 11.5–15)
GFR SERPL CREATININE-BSD FRML MDRD: >60 ML/MIN/1.73M2
GLUCOSE SERPL-MCNC: 87 MG/DL (ref 74–99)
HCT VFR BLD AUTO: 33.3 % (ref 34–48)
HGB BLD-MCNC: 11.3 G/DL (ref 11.5–15.5)
LYMPHOCYTES NFR BLD: 2.28 K/UL (ref 1.5–4)
LYMPHOCYTES RELATIVE PERCENT: 35 % (ref 20–42)
MAGNESIUM SERPL-MCNC: 2.1 MG/DL (ref 1.6–2.6)
MCH RBC QN AUTO: 31.3 PG (ref 26–35)
MCHC RBC AUTO-ENTMCNC: 33.9 G/DL (ref 32–34.5)
MCV RBC AUTO: 92.2 FL (ref 80–99.9)
MONOCYTES NFR BLD: 0.59 K/UL (ref 0.1–0.95)
MONOCYTES NFR BLD: 9 % (ref 2–12)
NEUTROPHILS NFR BLD: 56 % (ref 43–80)
NEUTS SEG NFR BLD: 3.64 K/UL (ref 1.8–7.3)
PHOSPHATE SERPL-MCNC: 3.1 MG/DL (ref 2.5–4.5)
PLATELET CONFIRMATION: NORMAL
PLATELET, FLUORESCENCE: 155 K/UL (ref 130–450)
PMV BLD AUTO: 10.7 FL (ref 7–12)
POTASSIUM SERPL-SCNC: 4.2 MMOL/L (ref 3.5–5)
PROT SERPL-MCNC: 6.1 G/DL (ref 6.4–8.3)
RBC # BLD AUTO: 3.61 M/UL (ref 3.5–5.5)
RBC # BLD: NORMAL 10*6/UL
SEND OUT REPORT: NORMAL
SODIUM SERPL-SCNC: 138 MMOL/L (ref 132–146)
TEST NAME: NORMAL
WBC OTHER # BLD: 6.5 K/UL (ref 4.5–11.5)

## 2024-01-04 PROCEDURE — 97110 THERAPEUTIC EXERCISES: CPT | Performed by: PHYSICAL THERAPIST

## 2024-01-04 PROCEDURE — 6370000000 HC RX 637 (ALT 250 FOR IP)

## 2024-01-04 PROCEDURE — 99221 1ST HOSP IP/OBS SF/LOW 40: CPT | Performed by: SURGERY

## 2024-01-04 PROCEDURE — 84100 ASSAY OF PHOSPHORUS: CPT

## 2024-01-04 PROCEDURE — 97530 THERAPEUTIC ACTIVITIES: CPT

## 2024-01-04 PROCEDURE — 6360000002 HC RX W HCPCS: Performed by: INTERNAL MEDICINE

## 2024-01-04 PROCEDURE — 97165 OT EVAL LOW COMPLEX 30 MIN: CPT

## 2024-01-04 PROCEDURE — 86316 IMMUNOASSAY TUMOR OTHER: CPT

## 2024-01-04 PROCEDURE — 1200000000 HC SEMI PRIVATE

## 2024-01-04 PROCEDURE — C9113 INJ PANTOPRAZOLE SODIUM, VIA: HCPCS | Performed by: INTERNAL MEDICINE

## 2024-01-04 PROCEDURE — 97530 THERAPEUTIC ACTIVITIES: CPT | Performed by: PHYSICAL THERAPIST

## 2024-01-04 PROCEDURE — 6370000000 HC RX 637 (ALT 250 FOR IP): Performed by: INTERNAL MEDICINE

## 2024-01-04 PROCEDURE — 83735 ASSAY OF MAGNESIUM: CPT

## 2024-01-04 PROCEDURE — 80053 COMPREHEN METABOLIC PANEL: CPT

## 2024-01-04 PROCEDURE — 2580000003 HC RX 258: Performed by: INTERNAL MEDICINE

## 2024-01-04 PROCEDURE — 97161 PT EVAL LOW COMPLEX 20 MIN: CPT | Performed by: PHYSICAL THERAPIST

## 2024-01-04 PROCEDURE — 36415 COLL VENOUS BLD VENIPUNCTURE: CPT

## 2024-01-04 PROCEDURE — 2500000003 HC RX 250 WO HCPCS: Performed by: INTERNAL MEDICINE

## 2024-01-04 PROCEDURE — 85025 COMPLETE CBC W/AUTO DIFF WBC: CPT

## 2024-01-04 RX ADMIN — SODIUM CHLORIDE: 9 INJECTION, SOLUTION INTRAVENOUS at 16:23

## 2024-01-04 RX ADMIN — ANTI-FUNGAL POWDER MICONAZOLE NITRATE TALC FREE: 1.42 POWDER TOPICAL at 20:04

## 2024-01-04 RX ADMIN — ENOXAPARIN SODIUM 30 MG: 100 INJECTION SUBCUTANEOUS at 08:10

## 2024-01-04 RX ADMIN — CYCLOBENZAPRINE HYDROCHLORIDE 5 MG: 5 TABLET, FILM COATED ORAL at 23:07

## 2024-01-04 RX ADMIN — SODIUM CHLORIDE: 9 INJECTION, SOLUTION INTRAVENOUS at 02:39

## 2024-01-04 RX ADMIN — CYCLOBENZAPRINE HYDROCHLORIDE 5 MG: 5 TABLET, FILM COATED ORAL at 16:54

## 2024-01-04 RX ADMIN — CYCLOBENZAPRINE HYDROCHLORIDE 5 MG: 5 TABLET, FILM COATED ORAL at 08:10

## 2024-01-04 RX ADMIN — ENOXAPARIN SODIUM 30 MG: 100 INJECTION SUBCUTANEOUS at 20:01

## 2024-01-04 RX ADMIN — LISINOPRIL 20 MG: 20 TABLET ORAL at 08:10

## 2024-01-04 RX ADMIN — LISINOPRIL 20 MG: 20 TABLET ORAL at 20:01

## 2024-01-04 RX ADMIN — PANTOPRAZOLE SODIUM 40 MG: 40 INJECTION, POWDER, FOR SOLUTION INTRAVENOUS at 08:10

## 2024-01-04 ASSESSMENT — PAIN DESCRIPTION - DESCRIPTORS: DESCRIPTORS: ACHING

## 2024-01-04 ASSESSMENT — PAIN SCALES - GENERAL
PAINLEVEL_OUTOF10: 6
PAINLEVEL_OUTOF10: 4
PAINLEVEL_OUTOF10: 4

## 2024-01-04 ASSESSMENT — PAIN DESCRIPTION - LOCATION
LOCATION: LEG
LOCATION: LEG

## 2024-01-04 ASSESSMENT — PAIN DESCRIPTION - ORIENTATION: ORIENTATION: LEFT;RIGHT

## 2024-01-04 NOTE — PLAN OF CARE
Problem: Discharge Planning  Goal: Discharge to home or other facility with appropriate resources  1/4/2024 1146 by Doreen Lemus RN  Outcome: Progressing  1/4/2024 0050 by Mary Nova RN  Outcome: Progressing     Problem: ABCDS Injury Assessment  Goal: Absence of physical injury  1/4/2024 1146 by Doreen Lemus RN  Outcome: Progressing  1/4/2024 0050 by Mary Nova RN  Outcome: Progressing     Problem: Safety - Adult  Goal: Free from fall injury  1/4/2024 1146 by Doreen Lemus RN  Outcome: Progressing  1/4/2024 0050 by Mary Nova RN  Outcome: Progressing     Problem: Pain  Goal: Verbalizes/displays adequate comfort level or baseline comfort level  1/4/2024 1146 by Doreen Lemus RN  Outcome: Progressing  1/4/2024 0050 by Mary Nova RN  Outcome: Progressing

## 2024-01-04 NOTE — PLAN OF CARE
Problem: Discharge Planning  Goal: Discharge to home or other facility with appropriate resources  1/4/2024 1152 by Doreen Lemus RN  Outcome: Progressing  1/4/2024 1146 by Doreen Lemus RN  Outcome: Progressing  1/4/2024 0050 by Mary Nova RN  Outcome: Progressing     Problem: ABCDS Injury Assessment  Goal: Absence of physical injury  1/4/2024 1152 by Doreen Lemus RN  Outcome: Progressing  1/4/2024 1146 by Doreen Lemus RN  Outcome: Progressing  1/4/2024 0050 by Mary Nova RN  Outcome: Progressing     Problem: Safety - Adult  Goal: Free from fall injury  1/4/2024 1152 by Doreen Lemus RN  Outcome: Progressing  1/4/2024 1146 by Doreen Lemus RN  Outcome: Progressing  1/4/2024 0050 by Mary Nova RN  Outcome: Progressing     Problem: Pain  Goal: Verbalizes/displays adequate comfort level or baseline comfort level  1/4/2024 1152 by Doreen Lemus RN  Outcome: Progressing  1/4/2024 1146 by Doreen Lemus RN  Outcome: Progressing  1/4/2024 0050 by Mary Nova RN  Outcome: Progressing

## 2024-01-04 NOTE — CARE COORDINATION
Case Management Assessment  Initial Evaluation    Date/Time of Evaluation: 1/4/2024 10:49 AM  Assessment Completed by: PATY Singer    If patient is discharged prior to next notation, then this note serves as note for discharge by case management.    Patient Name: Tere Lewis                   YOB: 1950  Diagnosis: Pancreatic cancer (HCC) [C25.9]  Pancreatic mass [K86.89]  Sciatica of left side [M54.32]  Closed fracture of right ankle, initial encounter [S82.891A]  Acute pancreatitis, unspecified complication status, unspecified pancreatitis type [K85.90]                   Date / Time: 1/2/2024 11:26 AM    Patient Admission Status: Inpatient   Readmission Risk (Low < 19, Mod (19-27), High > 27): Readmission Risk Score: 10.9    Current PCP: Awadalla, Bahaa A, MD  PCP verified by CM? Yes    Chart Reviewed: Yes      History Provided by: Patient  Patient Orientation: Alert and Oriented    Patient Cognition: Alert    Hospitalization in the last 30 days (Readmission):  No    If yes, Readmission Assessment in  Navigator will be completed.    Advance Directives:      Code Status: DNR-CC   Patient's Primary Decision Maker is:      Primary Decision Maker: Chong Barajas - Miguel    Discharge Planning:    Patient lives with: Alone Type of Home: House  Primary Care Giver: Self  Patient Support Systems include: Family Members   Current Financial resources:    Current community resources:    Current services prior to admission: Home Care            Current DME:              Type of Home Care services:  Aide Services, Housekeeping    ADLS  Prior functional level: Independent in ADLs/IADLs  Current functional level: Assistance with the following:, Bathing, Dressing, Toileting, Mobility    PT AM-PAC:   /24  OT AM-PAC:   /24    Family can provide assistance at DC: No  Would you like Case Management to discuss the discharge plan with any other family members/significant others, and if so, who? No  Plans to

## 2024-01-04 NOTE — CONSULTS
Attending Physician Statement:    Chief Complaint:   Chief Complaint   Patient presents with    Back Pain     Started month ago, hx of sciatica pain, getting worse    Ankle Pain     Pain and swelling in R ankle, trouble walking        I have examined the patient and performed the key aspects of physical exam, reviewed the record (including all pertinent and new radiology images and laboratory findings), and discussed the case with the surgical team.  I agree with the assessment and plan with the following additions, corrections, and changes. 14pt review of symptoms completed and negative except as mentioned.    72 yo F admitted with sciatica and ankle fracture was found to have elevated lipase on admission and CT showed incidental finding of 5.cm cystic pancreatic head mass with PD obstruction and dilation without biliary obstruction concerning for primary malignancy. Mass appears to be involving the SMV with abutment and encasement of the portal vein and GDA without SMA or celiac invasion. An MRCP is pending as are tumor markers. Will get CT triphasic pancreatic protocol to best visualize the vascular anatomy and chest CT for metastatic work up. Appears to be borderline to locally advanced lesion which would require neoadjuvant chemo pending path. GI consulted for EUS and biopsy as well. The patient has stated that she is not sure if she would want any treatments pending the biopsy results however discussed that true recommendations will depend on what is found during EUS. Will continue to follow.     Thank you for allowing me to participate in Ms. Lewis's care.   60 Minutes of which greater than 50% was spent counseling or coordinating her care.      Tessa Lindsay MD  01/03/24  2:29 PM        Hepatobiliary and Pancreatic Surgery Attending History and Physical    Patient's Name/Date of Birth: Tere Lewis /1950 (73 y.o.)    Date: January 3, 2024     Physician Consulted: Dr Ovalle  Reason for 
Surgical Endoscopy Consult  [facility]  Delano Perez MD    Patient's Name/Date of Birth: Tere Lewis / 1950    Date: January 4, 2024     Consulting Surgeon: Delano Perez MD    PCP: Awadalla, Bahaa A, MD     Chief Complaint: Pancreatic mass    HPI:   Tere Lewis is a 73 y.o. female who presents for evaluation of ankle fracture and sciatic pain and noted to have elevated lipase level.  CT scan of abdomen and pelvis showed an expansile lobulated pancreatic head mass measuring 5.3 x 5.1 cm causing obstruction of pancreatic duct.  Pancreatic duct was dilated and measured 2.1 cm.  There was no dilatation of intrahepatic biliary tree.  Peripancreatic fat planes were preserved.  The lesion causes abutment of superior mesenteric vein with partial encasement of proximal main portal vein but not obstructing the portal venous system.  Incidental 1.5 cm hypodense lesion was noted in the right adrenal gland.  There were 2 appearing benign left kidney cysts.  No retroperitoneal lymphadenopathy.  CT scan of the chest showed no evidence of intrathoracic metastatic disease.  Patient seen by hepatobiliary surgery Dr. Neftali HERNANDEZ was consulted for EUS and biopsy for tissue diagnosis.  CMP shows normal renal function with a serum creatinine of 0.6.  Hepatic panel relevant for minimally elevated AST at 35 with normal total bilirubin and normal alkaline phosphatase.  Serum lipase was markedly elevated at 1618  CBC in the normal range  CEA and  were normal.  CA 19-9 pending.      Patient Active Problem List   Diagnosis    DJD (degenerative joint disease) of knee    Primary osteoarthritis of both knees    DDD (degenerative disc disease), lumbar    Lumbosacral radiculopathy at L4    Lumbar spinal stenosis    Tibial plateau fracture, left, closed, initial encounter    Pancreatic cancer (HCC)    Pancreatic mass    Sciatica of left side    Closed fracture of right ankle       No Known Allergies    Past 
alert, oriented to name, place and time. Cranial nerves II-XII are grossly intact. Motor is 5 out of 5 bilaterally. Sensory is intact. Babinski down going  MUSCULOSKELETAL:  right lower extremity:  Skin intact circumferentially, significant lymphadenopathy on the right lower extremity symmetrical with the left.  No signs of acute trauma.  Compartments soft and compressible  Patient has tenderness to palpation about the right ankle.  Patient denies any tenderness about the right knee or hip.  Patient able to fully range her ankle, 5 out of 5 plantarflexion dorsiflexion.  5 out of 5 EHL.  +2/4 DP & PT pulses, Brisk Cap refill, Toes warm and perfused  Distal sensation grossly intact to Peroneals, Sural, Saphenous, and tibial nrs     left lower extremity:  Skin intact circumferentially, no obvious signs of trauma, significant lymphadenopathy on left lower extremity symmetrical with the right.  Compartments soft and compressible  Mild tenderness to palpation about the left lateral hip.  Patient unable to lift her leg secondary to her sciatic pain.  +PF/DF/EHL  +2/4 DP & PT pulses, Brisk Cap refill, Toes warm and perfused  Distal sensation grossly intact to Peroneals, Sural, Saphenous, and tibial nrs     Secondary Exam:   bilateralUE: No obvious signs of trauma.  -TTP to fingers, hand, wrist, forearm, elbow, humerus, shoulder or clavicle.-- Patient able to flex/extend fingers, wrist, elbow and shoulder with active and passive ROM without pain, +2/4 Radial pulse, cap refill <3sec, +AIN/PIN/Radial/Ulnar/Median N, distal sensation grossly intact to C4-T1 dermatomes, compartments soft and compressible.      DATA:    CBC:   Lab Results   Component Value Date/Time    WBC 9.0 01/02/2024 03:01 PM    RBC 4.26 01/02/2024 03:01 PM    HGB 13.4 01/02/2024 03:01 PM    HCT 40.5 01/02/2024 03:01 PM    MCV 95.1 01/02/2024 03:01 PM    MCH 31.5 01/02/2024 03:01 PM    MCHC 33.1 01/02/2024 03:01 PM    RDW 12.8 01/02/2024 03:01 PM     
extremity to be correlate with the clinical data.  See above comments.     CT LUMBAR SPINE WO CONTRAST    Result Date: 1/2/2024  EXAMINATION: CT OF THE LUMBAR SPINE WITHOUT CONTRAST  1/2/2024 TECHNIQUE: CT of the lumbar spine was performed without the administration of intravenous contrast. Multiplanar reformatted images are provided for review. Adjustment of mA and/or kV according to patient size was utilized.  Automated exposure control, iterative reconstruction, and/or weight based adjustment of the mA/kV was utilized to reduce the radiation dose to as low as reasonably achievable. COMPARISON: Plain radiographs lumbar spine 10/18/2016. HISTORY: ORDERING SYSTEM PROVIDED HISTORY: low back pain w/ LLE radiculopathy TECHNOLOGIST PROVIDED HISTORY: Reason for exam:->low back pain w/ LLE radiculopathy Decision Support Exception - unselect if not a suspected or confirmed emergency medical condition->Emergency Medical Condition (MA) FINDINGS: Osteopenia is noted.  There is mild anterior malalignment of L4 on L5 and of L5 on S1.  Disc space narrowing and discovertebral degenerative changes are identified.  Facet arthritis is also seen.  There is neural foraminal narrowing at L4-5 on the left.  No vertebral fracture is identified.  No spondylolysis is seen on the right or left.  The sacroiliac joints are intact.  The paraspinal soft tissues appear unremarkable.  There is a 6.2 x 4.2 x 5.4 cm mass suspicious for malignancy arising from the head of the pancreas.  Pancreatic ductal dilatation is suspected.  There is a 1.5 cm low-density right adrenal nodule.  This finding could represent a benign adenoma.  There may be cholelithiasis.  There is a left renal cyst. Atherosclerotic disease is noted     Osteopenia. Mild vertebral malalignment. Disc space narrowing, discovertebral degenerative changes, and facet arthritis. Neural foraminal narrowing at L4-5 on the left. Pancreatic head mass suspicious for malignancy.     Vascular

## 2024-01-05 ENCOUNTER — ANESTHESIA EVENT (OUTPATIENT)
Dept: ENDOSCOPY | Age: 74
End: 2024-01-05
Payer: MEDICARE

## 2024-01-05 ENCOUNTER — APPOINTMENT (OUTPATIENT)
Dept: GENERAL RADIOLOGY | Age: 74
DRG: 436 | End: 2024-01-05
Payer: MEDICARE

## 2024-01-05 ENCOUNTER — ANESTHESIA (OUTPATIENT)
Dept: ENDOSCOPY | Age: 74
End: 2024-01-05
Payer: MEDICARE

## 2024-01-05 LAB
ABO + RH BLD: NORMAL
ALBUMIN SERPL-MCNC: 3.1 G/DL (ref 3.5–5.2)
ALP SERPL-CCNC: 66 U/L (ref 35–104)
ALT SERPL-CCNC: 14 U/L (ref 0–32)
ANION GAP SERPL CALCULATED.3IONS-SCNC: 10 MMOL/L (ref 7–16)
ARM BAND NUMBER: NORMAL
AST SERPL-CCNC: 23 U/L (ref 0–31)
BASOPHILS # BLD: 0.04 K/UL (ref 0–0.2)
BASOPHILS NFR BLD: 1 % (ref 0–2)
BILIRUB SERPL-MCNC: 0.4 MG/DL (ref 0–1.2)
BLOOD BANK SAMPLE EXPIRATION: NORMAL
BLOOD GROUP ANTIBODIES SERPL: NEGATIVE
BUN SERPL-MCNC: 8 MG/DL (ref 6–23)
CALCIUM SERPL-MCNC: 8.3 MG/DL (ref 8.6–10.2)
CANCER AG19-9 SERPL IA-ACNC: 2 U/ML (ref 0–35)
CHLORIDE SERPL-SCNC: 109 MMOL/L (ref 98–107)
CHROMOGRANIN A: 144 NG/ML (ref 0–103)
CO2 SERPL-SCNC: 21 MMOL/L (ref 22–29)
CREAT SERPL-MCNC: 0.6 MG/DL (ref 0.5–1)
EOSINOPHIL # BLD: 0.39 K/UL (ref 0.05–0.5)
EOSINOPHILS RELATIVE PERCENT: 6 % (ref 0–6)
ERYTHROCYTE [DISTWIDTH] IN BLOOD BY AUTOMATED COUNT: 12.7 % (ref 11.5–15)
ERYTHROCYTE [DISTWIDTH] IN BLOOD BY AUTOMATED COUNT: 12.9 % (ref 11.5–15)
ERYTHROCYTE [DISTWIDTH] IN BLOOD BY AUTOMATED COUNT: 13 % (ref 11.5–15)
GFR SERPL CREATININE-BSD FRML MDRD: >60 ML/MIN/1.73M2
GLUCOSE SERPL-MCNC: 93 MG/DL (ref 74–99)
HCT VFR BLD AUTO: 28.5 % (ref 34–48)
HCT VFR BLD AUTO: 28.6 % (ref 34–48)
HCT VFR BLD AUTO: 30.2 % (ref 34–48)
HGB BLD-MCNC: 10.2 G/DL (ref 11.5–15.5)
HGB BLD-MCNC: 9.6 G/DL (ref 11.5–15.5)
HGB BLD-MCNC: 9.7 G/DL (ref 11.5–15.5)
IMM GRANULOCYTES # BLD AUTO: 0.03 K/UL (ref 0–0.58)
IMM GRANULOCYTES NFR BLD: 1 % (ref 0–5)
LYMPHOCYTES NFR BLD: 2.06 K/UL (ref 1.5–4)
LYMPHOCYTES RELATIVE PERCENT: 33 % (ref 20–42)
MAGNESIUM SERPL-MCNC: 1.9 MG/DL (ref 1.6–2.6)
MCH RBC QN AUTO: 31.8 PG (ref 26–35)
MCH RBC QN AUTO: 31.8 PG (ref 26–35)
MCH RBC QN AUTO: 32 PG (ref 26–35)
MCHC RBC AUTO-ENTMCNC: 33.7 G/DL (ref 32–34.5)
MCHC RBC AUTO-ENTMCNC: 33.8 G/DL (ref 32–34.5)
MCHC RBC AUTO-ENTMCNC: 33.9 G/DL (ref 32–34.5)
MCV RBC AUTO: 94.1 FL (ref 80–99.9)
MCV RBC AUTO: 94.4 FL (ref 80–99.9)
MCV RBC AUTO: 94.4 FL (ref 80–99.9)
MONOCYTES NFR BLD: 0.71 K/UL (ref 0.1–0.95)
MONOCYTES NFR BLD: 11 % (ref 2–12)
NEUTROPHILS NFR BLD: 48 % (ref 43–80)
NEUTS SEG NFR BLD: 3.01 K/UL (ref 1.8–7.3)
PHOSPHATE SERPL-MCNC: 3 MG/DL (ref 2.5–4.5)
PLATELET # BLD AUTO: 185 K/UL (ref 130–450)
PLATELET # BLD AUTO: 186 K/UL (ref 130–450)
PLATELET # BLD AUTO: 201 K/UL (ref 130–450)
PMV BLD AUTO: 9.1 FL (ref 7–12)
PMV BLD AUTO: 9.2 FL (ref 7–12)
PMV BLD AUTO: 9.3 FL (ref 7–12)
POTASSIUM SERPL-SCNC: 3.6 MMOL/L (ref 3.5–5)
PROT SERPL-MCNC: 5.4 G/DL (ref 6.4–8.3)
RBC # BLD AUTO: 3.02 M/UL (ref 3.5–5.5)
RBC # BLD AUTO: 3.03 M/UL (ref 3.5–5.5)
RBC # BLD AUTO: 3.21 M/UL (ref 3.5–5.5)
SODIUM SERPL-SCNC: 140 MMOL/L (ref 132–146)
WBC OTHER # BLD: 6.1 K/UL (ref 4.5–11.5)
WBC OTHER # BLD: 6.2 K/UL (ref 4.5–11.5)
WBC OTHER # BLD: 9.5 K/UL (ref 4.5–11.5)

## 2024-01-05 PROCEDURE — 2580000003 HC RX 258: Performed by: NURSE ANESTHETIST, CERTIFIED REGISTERED

## 2024-01-05 PROCEDURE — 85025 COMPLETE CBC W/AUTO DIFF WBC: CPT

## 2024-01-05 PROCEDURE — 3609020800 HC EGD W/EUS FNA: Performed by: SURGERY

## 2024-01-05 PROCEDURE — 88342 IMHCHEM/IMCYTCHM 1ST ANTB: CPT

## 2024-01-05 PROCEDURE — 3700000001 HC ADD 15 MINUTES (ANESTHESIA): Performed by: SURGERY

## 2024-01-05 PROCEDURE — C9113 INJ PANTOPRAZOLE SODIUM, VIA: HCPCS | Performed by: STUDENT IN AN ORGANIZED HEALTH CARE EDUCATION/TRAINING PROGRAM

## 2024-01-05 PROCEDURE — 6370000000 HC RX 637 (ALT 250 FOR IP)

## 2024-01-05 PROCEDURE — 86901 BLOOD TYPING SEROLOGIC RH(D): CPT

## 2024-01-05 PROCEDURE — C1753 CATH, INTRAVAS ULTRASOUND: HCPCS | Performed by: SURGERY

## 2024-01-05 PROCEDURE — 86900 BLOOD TYPING SEROLOGIC ABO: CPT

## 2024-01-05 PROCEDURE — 88313 SPECIAL STAINS GROUP 2: CPT

## 2024-01-05 PROCEDURE — 0FBG8ZX EXCISION OF PANCREAS, VIA NATURAL OR ARTIFICIAL OPENING ENDOSCOPIC, DIAGNOSTIC: ICD-10-PCS | Performed by: SURGERY

## 2024-01-05 PROCEDURE — 2580000003 HC RX 258: Performed by: INTERNAL MEDICINE

## 2024-01-05 PROCEDURE — 86850 RBC ANTIBODY SCREEN: CPT

## 2024-01-05 PROCEDURE — 2720000010 HC SURG SUPPLY STERILE: Performed by: SURGERY

## 2024-01-05 PROCEDURE — 6360000002 HC RX W HCPCS: Performed by: NURSE ANESTHETIST, CERTIFIED REGISTERED

## 2024-01-05 PROCEDURE — 7100000010 HC PHASE II RECOVERY - FIRST 15 MIN: Performed by: SURGERY

## 2024-01-05 PROCEDURE — 6360000002 HC RX W HCPCS: Performed by: STUDENT IN AN ORGANIZED HEALTH CARE EDUCATION/TRAINING PROGRAM

## 2024-01-05 PROCEDURE — 88307 TISSUE EXAM BY PATHOLOGIST: CPT

## 2024-01-05 PROCEDURE — 1200000000 HC SEMI PRIVATE

## 2024-01-05 PROCEDURE — 71045 X-RAY EXAM CHEST 1 VIEW: CPT

## 2024-01-05 PROCEDURE — 84100 ASSAY OF PHOSPHORUS: CPT

## 2024-01-05 PROCEDURE — 2W3QX1Z IMMOBILIZATION OF RIGHT LOWER LEG USING SPLINT: ICD-10-PCS | Performed by: INTERNAL MEDICINE

## 2024-01-05 PROCEDURE — 80053 COMPREHEN METABOLIC PANEL: CPT

## 2024-01-05 PROCEDURE — 83735 ASSAY OF MAGNESIUM: CPT

## 2024-01-05 PROCEDURE — 36415 COLL VENOUS BLD VENIPUNCTURE: CPT

## 2024-01-05 PROCEDURE — 3700000000 HC ANESTHESIA ATTENDED CARE: Performed by: SURGERY

## 2024-01-05 PROCEDURE — 88341 IMHCHEM/IMCYTCHM EA ADD ANTB: CPT

## 2024-01-05 PROCEDURE — 6370000000 HC RX 637 (ALT 250 FOR IP): Performed by: INTERNAL MEDICINE

## 2024-01-05 PROCEDURE — 88305 TISSUE EXAM BY PATHOLOGIST: CPT

## 2024-01-05 PROCEDURE — 2500000003 HC RX 250 WO HCPCS

## 2024-01-05 PROCEDURE — 88173 CYTOPATH EVAL FNA REPORT: CPT

## 2024-01-05 PROCEDURE — 85027 COMPLETE CBC AUTOMATED: CPT

## 2024-01-05 PROCEDURE — 99232 SBSQ HOSP IP/OBS MODERATE 35: CPT | Performed by: STUDENT IN AN ORGANIZED HEALTH CARE EDUCATION/TRAINING PROGRAM

## 2024-01-05 PROCEDURE — 2709999900 HC NON-CHARGEABLE SUPPLY: Performed by: SURGERY

## 2024-01-05 PROCEDURE — 7100000011 HC PHASE II RECOVERY - ADDTL 15 MIN: Performed by: SURGERY

## 2024-01-05 RX ORDER — EPHEDRINE SULFATE/0.9% NACL/PF 50 MG/5 ML
10 SYRINGE (ML) INTRAVENOUS EVERY 10 MIN PRN
Status: DISCONTINUED | OUTPATIENT
Start: 2024-01-05 | End: 2024-01-05 | Stop reason: HOSPADM

## 2024-01-05 RX ORDER — SODIUM CHLORIDE 9 MG/ML
INJECTION, SOLUTION INTRAVENOUS CONTINUOUS PRN
Status: DISCONTINUED | OUTPATIENT
Start: 2024-01-05 | End: 2024-01-05 | Stop reason: SDUPTHER

## 2024-01-05 RX ORDER — PANTOPRAZOLE SODIUM 40 MG/10ML
40 INJECTION, POWDER, LYOPHILIZED, FOR SOLUTION INTRAVENOUS 2 TIMES DAILY
Status: DISCONTINUED | OUTPATIENT
Start: 2024-01-05 | End: 2024-01-08 | Stop reason: HOSPADM

## 2024-01-05 RX ORDER — PROPOFOL 10 MG/ML
INJECTION, EMULSION INTRAVENOUS PRN
Status: DISCONTINUED | OUTPATIENT
Start: 2024-01-05 | End: 2024-01-05 | Stop reason: SDUPTHER

## 2024-01-05 RX ORDER — EPHEDRINE SULFATE/0.9% NACL/PF 50 MG/5 ML
SYRINGE (ML) INTRAVENOUS
Status: COMPLETED
Start: 2024-01-05 | End: 2024-01-05

## 2024-01-05 RX ORDER — IPRATROPIUM BROMIDE AND ALBUTEROL SULFATE 2.5; .5 MG/3ML; MG/3ML
1 SOLUTION RESPIRATORY (INHALATION) ONCE
Status: COMPLETED | OUTPATIENT
Start: 2024-01-05 | End: 2024-01-05

## 2024-01-05 RX ORDER — IPRATROPIUM BROMIDE AND ALBUTEROL SULFATE 2.5; .5 MG/3ML; MG/3ML
SOLUTION RESPIRATORY (INHALATION)
Status: COMPLETED
Start: 2024-01-05 | End: 2024-01-05

## 2024-01-05 RX ADMIN — PANTOPRAZOLE SODIUM 40 MG: 40 INJECTION, POWDER, FOR SOLUTION INTRAVENOUS at 20:10

## 2024-01-05 RX ADMIN — HYDROCODONE BITARTRATE AND ACETAMINOPHEN 1 TABLET: 5; 325 TABLET ORAL at 15:57

## 2024-01-05 RX ADMIN — PHENYLEPHRINE HYDROCHLORIDE 200 MCG: 10 INJECTION INTRAVENOUS at 09:15

## 2024-01-05 RX ADMIN — IPRATROPIUM BROMIDE AND ALBUTEROL SULFATE 1 DOSE: 2.5; .5 SOLUTION RESPIRATORY (INHALATION) at 10:04

## 2024-01-05 RX ADMIN — SODIUM CHLORIDE: 900 INJECTION, SOLUTION INTRAVENOUS at 08:46

## 2024-01-05 RX ADMIN — PROPOFOL 100 MG: 10 INJECTION, EMULSION INTRAVENOUS at 08:58

## 2024-01-05 RX ADMIN — Medication 10 MG: at 09:59

## 2024-01-05 RX ADMIN — ANTI-FUNGAL POWDER MICONAZOLE NITRATE TALC FREE: 1.42 POWDER TOPICAL at 20:17

## 2024-01-05 RX ADMIN — HYDROCODONE BITARTRATE AND ACETAMINOPHEN 1 TABLET: 5; 325 TABLET ORAL at 22:48

## 2024-01-05 RX ADMIN — PHENYLEPHRINE HYDROCHLORIDE 100 MCG: 10 INJECTION INTRAVENOUS at 09:22

## 2024-01-05 RX ADMIN — PHENYLEPHRINE HYDROCHLORIDE 100 MCG: 10 INJECTION INTRAVENOUS at 09:29

## 2024-01-05 RX ADMIN — SODIUM CHLORIDE: 9 INJECTION, SOLUTION INTRAVENOUS at 15:50

## 2024-01-05 RX ADMIN — CYCLOBENZAPRINE HYDROCHLORIDE 5 MG: 5 TABLET, FILM COATED ORAL at 15:48

## 2024-01-05 RX ADMIN — LISINOPRIL 20 MG: 20 TABLET ORAL at 20:10

## 2024-01-05 RX ADMIN — PROPOFOL 75 MCG/KG/MIN: 10 INJECTION, EMULSION INTRAVENOUS at 08:59

## 2024-01-05 ASSESSMENT — PAIN DESCRIPTION - ORIENTATION: ORIENTATION: OTHER (COMMENT)

## 2024-01-05 ASSESSMENT — PAIN DESCRIPTION - LOCATION: LOCATION: OTHER (COMMENT)

## 2024-01-05 ASSESSMENT — PAIN DESCRIPTION - DESCRIPTORS: DESCRIPTORS: ACHING;DISCOMFORT

## 2024-01-05 ASSESSMENT — PAIN SCALES - GENERAL: PAINLEVEL_OUTOF10: 3

## 2024-01-05 NOTE — ANESTHESIA PRE PROCEDURE
ABDOMEN SURGERY  1984    VEIN SURGERY         Social History:    Social History     Tobacco Use    Smoking status: Former    Smokeless tobacco: Never   Substance Use Topics    Alcohol use: Yes     Comment: social                                Counseling given: Not Answered      Vital Signs (Current):   Vitals:    01/04/24 0800 01/04/24 1749 01/04/24 2001 01/05/24 0446   BP:  (!) 151/65 (!) 151/65 117/65   Pulse:  78  76   Resp:  17  17   Temp:  36.7 °C (98.1 °F)  37.1 °C (98.8 °F)   TempSrc:  Oral  Oral   SpO2: 97% 100%  97%   Weight:       Height:                                                  BP Readings from Last 3 Encounters:   01/05/24 117/65   10/18/22 (!) 166/62   05/10/22 (!) 145/77       NPO Status:                                                                                 BMI:   Wt Readings from Last 3 Encounters:   01/04/24 109.6 kg (241 lb 11.2 oz)   04/11/23 102.1 kg (225 lb)   03/07/23 102.1 kg (225 lb)     Body mass index is 45.67 kg/m².    CBC:   Lab Results   Component Value Date/Time    WBC 6.2 01/05/2024 03:59 AM    RBC 3.21 01/05/2024 03:59 AM    HGB 10.2 01/05/2024 03:59 AM    HCT 30.2 01/05/2024 03:59 AM    MCV 94.1 01/05/2024 03:59 AM    RDW 12.7 01/05/2024 03:59 AM     01/05/2024 03:59 AM       CMP:   Lab Results   Component Value Date/Time     01/05/2024 03:59 AM    K 3.6 01/05/2024 03:59 AM    K 4.0 10/14/2022 12:36 PM     01/05/2024 03:59 AM    CO2 21 01/05/2024 03:59 AM    BUN 8 01/05/2024 03:59 AM    CREATININE 0.6 01/05/2024 03:59 AM    GFRAA >60 10/17/2022 04:56 AM    LABGLOM >60 01/05/2024 03:59 AM    GLUCOSE 93 01/05/2024 03:59 AM    PROT 5.4 01/05/2024 03:59 AM    CALCIUM 8.3 01/05/2024 03:59 AM    BILITOT 0.4 01/05/2024 03:59 AM    ALKPHOS 66 01/05/2024 03:59 AM    AST 23 01/05/2024 03:59 AM    ALT 14 01/05/2024 03:59 AM       POC Tests: No results for input(s): \"POCGLU\", \"POCNA\", \"POCK\", \"POCCL\", \"POCBUN\", \"POCHEMO\", \"POCHCT\" in the last 72

## 2024-01-05 NOTE — PLAN OF CARE
Problem: Discharge Planning  Goal: Discharge to home or other facility with appropriate resources  1/4/2024 2258 by Donta Srinivasan RN  Outcome: Progressing  1/4/2024 1152 by Doreen Lemus RN  Outcome: Progressing  1/4/2024 1146 by Doreen Lemus RN  Outcome: Progressing     Problem: ABCDS Injury Assessment  Goal: Absence of physical injury  1/4/2024 2258 by Donta Srinivasan RN  Outcome: Progressing  1/4/2024 1152 by Doreen Lemus RN  Outcome: Progressing  1/4/2024 1146 by Doreen Lemus RN  Outcome: Progressing     Problem: Safety - Adult  Goal: Free from fall injury  1/4/2024 2258 by Donta Srinivasan RN  Outcome: Progressing  1/4/2024 1152 by Doreen Lemus RN  Outcome: Progressing  1/4/2024 1146 by Doreen Lemus RN  Outcome: Progressing     Problem: Pain  Goal: Verbalizes/displays adequate comfort level or baseline comfort level  1/4/2024 2258 by Donta Srinivasan RN  Outcome: Progressing  1/4/2024 1152 by Doreen Lemus RN  Outcome: Progressing  1/4/2024 1146 by Doreen Lemus RN  Outcome: Progressing

## 2024-01-05 NOTE — OP NOTE
Operative Note      Patient: Tere Lewis  YOB: 1950  MRN: [id number]    Date of Procedure: 1/5/2024    Pre-Op Diagnosis Codes:     * Pancreatic mass [K86.89]    Post-Op Diagnosis: Same       Procedure(s):  EGD W/EUS FNA    Surgeon(s):  Delano Perez MD    Assistant:   Surgical Assistant: Betty Sullivan RN    Anesthesia: Monitor Anesthesia Care    Estimated Blood Loss (mL): less than 50     Complications: Bleeding    Specimens:   ID Type Source Tests Collected by Time Destination   A : pancreatic head mass FNA Tissue Fine Needle Aspirate SURGICAL PATHOLOGY Delano Perez MD 1/5/2024 0907    B : pancreatic head mass FNA Tissue Fine Needle Aspirate CYTOLOGY, NON-GYN Delano Perez MD 1/5/2024 0907        Implants:  * No implants in log *      Drains:   External Urinary Catheter (Active)   Site Assessment Clean,dry & intact 01/04/24 2000   Placement Replaced 01/04/24 0842   Securement Method Tape 01/04/24 0842   Catheter Care Catheter/Wick replaced;Suction Canister/Tubing changed 01/04/24 0842   Perineal Care Yes 01/04/24 0842   Suction 40 mmgHg continuous 01/04/24 0842   Urine Color Yellow 01/04/24 0842   Urine Appearance Clear 01/04/24 0842   Output (mL) 1000 mL 01/04/24 1434       Findings: see below    Detailed Description of Procedure:       Indications and History:  The patient is a 73 y.o. female.  The risks, benefits, complications, treatment options and expected outcomes were discussed with the patient.  The possibilities of reaction to medication, pulmonary aspiration, perforation of the gastrointestinal tract, bleeding requiring transfusion or operation, respiratory failure requiring placement on a ventilator and failure to diagnose a condition were discussed with the patient who freely signed the consent.      Description of Procedure:  The patient was taken to the endoscopy suite, identified as Tere Lewis and the procedure verified as Endoscopic Ultrasound (EUS).  A Time

## 2024-01-05 NOTE — DISCHARGE INSTR - COC
Continuity of Care Form    Patient Name: Tere Lewis   :  1950  MRN:  [id number]    Admit date:  2024  Discharge date:  2024    Code Status Order: DNR-CC   Advance Directives:     Admitting Physician:  Ryan Robles DO  PCP: Awadalla, Bahaa A, MD    Discharging Nurse: ***  Discharging Hospital Unit/Room#: ENDO POOL ROOM/NONE  Discharging Unit Phone Number: [phone]    Emergency Contact:   Extended Emergency Contact Information  Primary Emergency Contact: Maral Coles  Home Phone: [phone]  Mobile Phone: [phone]  Relation: Other  Secondary Emergency Contact: Chong Barajas  Relation: Other    Past Surgical History:  Past Surgical History:   Procedure Laterality Date    ABDOMEN SURGERY      VEIN SURGERY         Immunization History:   Immunization History   Administered Date(s) Administered    COVID-19, MODERNA BLUE border, Primary or Immunocompromised, (age 12y+), IM, 100 mcg/0.5mL 2021, 2021    COVID-19, MODERNA Booster BLUE border, (age 18y+), IM, 50mcg/0.25mL 11/15/2021    COVID-19, PFIZER Bivalent, DO NOT Dilute, (age 12y+), IM, 30 mcg/0.3 mL 2022    COVID-19, PFIZER, (- formula), (age 12y+), IM, 30mcg/0.3mL 2023    Pneumococcal, PPSV23, PNEUMOVAX 23, (age 2y+), SC/IM, 0.5mL 2018    TDaP, ADACEL (age 10y-64y), BOOSTRIX (age 10y+), IM, 0.5mL 2018    Zoster Recombinant (Shingrix) 2009       Active Problems:  Patient Active Problem List   Diagnosis Code    DJD (degenerative joint disease) of knee M17.9    Primary osteoarthritis of both knees M17.0    DDD (degenerative disc disease), lumbar M51.36    Lumbosacral radiculopathy at L4 M54.17    Lumbar spinal stenosis M48.061    Tibial plateau fracture, left, closed, initial encounter S82.142A    Pancreatic cancer (HCC) C25.9    Pancreatic mass K86.89    Sciatica of left side M54.32    Closed fracture of right ankle S82.891A       Isolation/Infection:   Isolation            No Isolation

## 2024-01-05 NOTE — CARE COORDINATION
1/5/2024: SS NOTE:  Notified by [facility] that pt was accepted for [facility] placement under her Medicare, NO PRE CERT NEEDED and pt has had her 3 day qualifying Inpt hospital stay for SNF coverage, pt currently in OR, [facility] initiated, sw/cm will follow for medical discharge to confirm pt's transfer and to complete exemption, Nursing informed.Electronically signed by PATY Singer on 1/5/2024 at 12:37 PM

## 2024-01-06 ENCOUNTER — APPOINTMENT (OUTPATIENT)
Dept: CT IMAGING | Age: 74
DRG: 436 | End: 2024-01-06
Payer: MEDICARE

## 2024-01-06 LAB
ALBUMIN SERPL-MCNC: 3 G/DL (ref 3.5–5.2)
ALP SERPL-CCNC: 125 U/L (ref 35–104)
ALT SERPL-CCNC: 257 U/L (ref 0–32)
ANION GAP SERPL CALCULATED.3IONS-SCNC: 11 MMOL/L (ref 7–16)
AST SERPL-CCNC: 388 U/L (ref 0–31)
BASOPHILS # BLD: 0 K/UL (ref 0–0.2)
BASOPHILS # BLD: 0.01 K/UL (ref 0–0.2)
BASOPHILS NFR BLD: 0 % (ref 0–2)
BASOPHILS NFR BLD: 0 % (ref 0–2)
BILIRUB SERPL-MCNC: 1.8 MG/DL (ref 0–1.2)
BUN SERPL-MCNC: 19 MG/DL (ref 6–23)
CALCIUM SERPL-MCNC: 8.2 MG/DL (ref 8.6–10.2)
CHLORIDE SERPL-SCNC: 105 MMOL/L (ref 98–107)
CHROMOGRANIN A: 161 NG/ML (ref 0–103)
CO2 SERPL-SCNC: 17 MMOL/L (ref 22–29)
CREAT SERPL-MCNC: 0.9 MG/DL (ref 0.5–1)
EOSINOPHIL # BLD: 0 K/UL (ref 0.05–0.5)
EOSINOPHIL # BLD: 0 K/UL (ref 0.05–0.5)
EOSINOPHILS RELATIVE PERCENT: 0 % (ref 0–6)
EOSINOPHILS RELATIVE PERCENT: 0 % (ref 0–6)
ERYTHROCYTE [DISTWIDTH] IN BLOOD BY AUTOMATED COUNT: 12.9 % (ref 11.5–15)
ERYTHROCYTE [DISTWIDTH] IN BLOOD BY AUTOMATED COUNT: 13 % (ref 11.5–15)
GFR SERPL CREATININE-BSD FRML MDRD: >60 ML/MIN/1.73M2
GLUCOSE SERPL-MCNC: 155 MG/DL (ref 74–99)
HCT VFR BLD AUTO: 25 % (ref 34–48)
HCT VFR BLD AUTO: 25 % (ref 34–48)
HGB BLD-MCNC: 8.4 G/DL (ref 11.5–15.5)
HGB BLD-MCNC: 8.7 G/DL (ref 11.5–15.5)
IMM GRANULOCYTES # BLD AUTO: 0.07 K/UL (ref 0–0.58)
IMM GRANULOCYTES NFR BLD: 1 % (ref 0–5)
LACTATE BLDV-SCNC: 1.5 MMOL/L (ref 0.5–2.2)
LYMPHOCYTES NFR BLD: 0.42 K/UL (ref 1.5–4)
LYMPHOCYTES NFR BLD: 0.72 K/UL (ref 1.5–4)
LYMPHOCYTES RELATIVE PERCENT: 4 % (ref 20–42)
LYMPHOCYTES RELATIVE PERCENT: 6 % (ref 20–42)
MAGNESIUM SERPL-MCNC: 1.7 MG/DL (ref 1.6–2.6)
MCH RBC QN AUTO: 31.6 PG (ref 26–35)
MCH RBC QN AUTO: 31.9 PG (ref 26–35)
MCHC RBC AUTO-ENTMCNC: 33.6 G/DL (ref 32–34.5)
MCHC RBC AUTO-ENTMCNC: 34.8 G/DL (ref 32–34.5)
MCV RBC AUTO: 90.9 FL (ref 80–99.9)
MCV RBC AUTO: 95.1 FL (ref 80–99.9)
MONOCYTES NFR BLD: 0.11 K/UL (ref 0.1–0.95)
MONOCYTES NFR BLD: 0.62 K/UL (ref 0.1–0.95)
MONOCYTES NFR BLD: 1 % (ref 2–12)
MONOCYTES NFR BLD: 5 % (ref 2–12)
NEUTROPHILS NFR BLD: 89 % (ref 43–80)
NEUTROPHILS NFR BLD: 96 % (ref 43–80)
NEUTS SEG NFR BLD: 11.22 K/UL (ref 1.8–7.3)
NEUTS SEG NFR BLD: 11.57 K/UL (ref 1.8–7.3)
PHOSPHATE SERPL-MCNC: 2.3 MG/DL (ref 2.5–4.5)
PLATELET # BLD AUTO: 172 K/UL (ref 130–450)
PLATELET # BLD AUTO: 180 K/UL (ref 130–450)
PMV BLD AUTO: 9 FL (ref 7–12)
PMV BLD AUTO: 9.2 FL (ref 7–12)
POTASSIUM SERPL-SCNC: 3.5 MMOL/L (ref 3.5–5)
PROT SERPL-MCNC: 5.3 G/DL (ref 6.4–8.3)
RBC # BLD AUTO: 2.63 M/UL (ref 3.5–5.5)
RBC # BLD AUTO: 2.75 M/UL (ref 3.5–5.5)
RBC # BLD: ABNORMAL 10*6/UL
SODIUM SERPL-SCNC: 133 MMOL/L (ref 132–146)
TROPONIN I SERPL HS-MCNC: 39 NG/L (ref 0–9)
WBC OTHER # BLD: 12.1 K/UL (ref 4.5–11.5)
WBC OTHER # BLD: 12.6 K/UL (ref 4.5–11.5)

## 2024-01-06 PROCEDURE — 83605 ASSAY OF LACTIC ACID: CPT

## 2024-01-06 PROCEDURE — 1200000000 HC SEMI PRIVATE

## 2024-01-06 PROCEDURE — 36415 COLL VENOUS BLD VENIPUNCTURE: CPT

## 2024-01-06 PROCEDURE — 93005 ELECTROCARDIOGRAM TRACING: CPT | Performed by: INTERNAL MEDICINE

## 2024-01-06 PROCEDURE — 6360000004 HC RX CONTRAST MEDICATION: Performed by: RADIOLOGY

## 2024-01-06 PROCEDURE — 99232 SBSQ HOSP IP/OBS MODERATE 35: CPT | Performed by: TRANSPLANT SURGERY

## 2024-01-06 PROCEDURE — 84100 ASSAY OF PHOSPHORUS: CPT

## 2024-01-06 PROCEDURE — 6360000002 HC RX W HCPCS: Performed by: STUDENT IN AN ORGANIZED HEALTH CARE EDUCATION/TRAINING PROGRAM

## 2024-01-06 PROCEDURE — 6370000000 HC RX 637 (ALT 250 FOR IP)

## 2024-01-06 PROCEDURE — 83735 ASSAY OF MAGNESIUM: CPT

## 2024-01-06 PROCEDURE — 85025 COMPLETE CBC W/AUTO DIFF WBC: CPT

## 2024-01-06 PROCEDURE — 2580000003 HC RX 258

## 2024-01-06 PROCEDURE — 6360000002 HC RX W HCPCS

## 2024-01-06 PROCEDURE — 6370000000 HC RX 637 (ALT 250 FOR IP): Performed by: INTERNAL MEDICINE

## 2024-01-06 PROCEDURE — 74175 CTA ABDOMEN W/CONTRAST: CPT

## 2024-01-06 PROCEDURE — 80053 COMPREHEN METABOLIC PANEL: CPT

## 2024-01-06 PROCEDURE — 84484 ASSAY OF TROPONIN QUANT: CPT

## 2024-01-06 PROCEDURE — C9113 INJ PANTOPRAZOLE SODIUM, VIA: HCPCS | Performed by: STUDENT IN AN ORGANIZED HEALTH CARE EDUCATION/TRAINING PROGRAM

## 2024-01-06 RX ORDER — SODIUM CHLORIDE 9 MG/ML
INJECTION, SOLUTION INTRAVENOUS ONCE
Status: COMPLETED | OUTPATIENT
Start: 2024-01-06 | End: 2024-01-06

## 2024-01-06 RX ORDER — METHYLPREDNISOLONE ACETATE 80 MG/ML
80 INJECTION, SUSPENSION INTRA-ARTICULAR; INTRALESIONAL; INTRAMUSCULAR; SOFT TISSUE ONCE
Status: COMPLETED | OUTPATIENT
Start: 2024-01-06 | End: 2024-01-06

## 2024-01-06 RX ADMIN — ASPIRIN 81 MG: 81 TABLET, COATED ORAL at 08:43

## 2024-01-06 RX ADMIN — CYCLOBENZAPRINE HYDROCHLORIDE 5 MG: 5 TABLET, FILM COATED ORAL at 17:14

## 2024-01-06 RX ADMIN — CYCLOBENZAPRINE HYDROCHLORIDE 5 MG: 5 TABLET, FILM COATED ORAL at 08:43

## 2024-01-06 RX ADMIN — ASPIRIN 81 MG: 81 TABLET, COATED ORAL at 20:15

## 2024-01-06 RX ADMIN — PANTOPRAZOLE SODIUM 40 MG: 40 INJECTION, POWDER, FOR SOLUTION INTRAVENOUS at 08:30

## 2024-01-06 RX ADMIN — IOPAMIDOL 75 ML: 755 INJECTION, SOLUTION INTRAVENOUS at 12:09

## 2024-01-06 RX ADMIN — METHYLPREDNISOLONE ACETATE 80 MG: 80 INJECTION, SUSPENSION INTRA-ARTICULAR; INTRALESIONAL; INTRAMUSCULAR; SOFT TISSUE at 13:11

## 2024-01-06 RX ADMIN — ANTI-FUNGAL POWDER MICONAZOLE NITRATE TALC FREE: 1.42 POWDER TOPICAL at 20:15

## 2024-01-06 RX ADMIN — SODIUM CHLORIDE: 9 INJECTION, SOLUTION INTRAVENOUS at 08:29

## 2024-01-06 RX ADMIN — ANTI-FUNGAL POWDER MICONAZOLE NITRATE TALC FREE: 1.42 POWDER TOPICAL at 08:44

## 2024-01-06 RX ADMIN — PANTOPRAZOLE SODIUM 40 MG: 40 INJECTION, POWDER, FOR SOLUTION INTRAVENOUS at 20:15

## 2024-01-06 NOTE — ANESTHESIA POSTPROCEDURE EVALUATION
Department of Anesthesiology  Postprocedure Note    Patient: Tere Lewis  MRN: [id number]  YOB: 1950  Date of evaluation: 1/5/2024    Procedure Summary       Date: 01/05/24 Room / Location: [place] [address] / [facility]    Anesthesia Start: 0846 Anesthesia Stop: 0944    Procedure: EGD W/EUS FNA Diagnosis:       Pancreatic mass      (Pancreatic mass [K86.89])    Surgeons: Delano Perez MD Responsible Provider: Jaron Crowley MD    Anesthesia Type: MAC ASA Status: 3            Anesthesia Type: No value filed.    Mark Anthony Phase I: Mark Anthony Score: 8    Mark Anthony Phase II: Mark Anthony Score: 8    Anesthesia Post Evaluation    Patient location during evaluation: bedside  Patient participation: complete - patient participated  Level of consciousness: awake  Pain score: 0  Airway patency: patent  Nausea & Vomiting: no nausea and no vomiting  Cardiovascular status: hemodynamically stable  Respiratory status: acceptable  Hydration status: euvolemic  Pain management: adequate      No notable events documented.

## 2024-01-06 NOTE — PLAN OF CARE
Problem: Discharge Planning  Goal: Discharge to home or other facility with appropriate resources  Outcome: Progressing     Problem: ABCDS Injury Assessment  Goal: Absence of physical injury  Outcome: Progressing     Problem: Safety - Adult  Goal: Free from fall injury  Outcome: Progressing     Problem: Pain  Goal: Verbalizes/displays adequate comfort level or baseline comfort level  Outcome: Progressing     Problem: Nutrition Deficit:  Goal: Optimize nutritional status  1/6/2024 0011 by Flaquito Dyson RN  Outcome: Progressing  1/5/2024 1239 by Laura Bennett RD  Flowsheets (Taken 1/5/2024 1239)  Nutrient intake appropriate for improving, restoring, or maintaining nutritional needs:   Assess nutritional status and recommend course of action   Recommend appropriate diets, oral nutritional supplements, and vitamin/mineral supplements   Monitor oral intake, labs, and treatment plans

## 2024-01-07 LAB
ALBUMIN SERPL-MCNC: 2.7 G/DL (ref 3.5–5.2)
ALP SERPL-CCNC: 106 U/L (ref 35–104)
ALT SERPL-CCNC: 183 U/L (ref 0–32)
ANION GAP SERPL CALCULATED.3IONS-SCNC: 13 MMOL/L (ref 7–16)
AST SERPL-CCNC: 154 U/L (ref 0–31)
BASOPHILS # BLD: 0.02 K/UL (ref 0–0.2)
BASOPHILS NFR BLD: 0 % (ref 0–2)
BILIRUB DIRECT SERPL-MCNC: 0.3 MG/DL (ref 0–0.3)
BILIRUB INDIRECT SERPL-MCNC: 0.4 MG/DL (ref 0–1)
BILIRUB SERPL-MCNC: 0.7 MG/DL (ref 0–1.2)
BUN SERPL-MCNC: 14 MG/DL (ref 6–23)
CALCIUM SERPL-MCNC: 8.1 MG/DL (ref 8.6–10.2)
CHLORIDE SERPL-SCNC: 107 MMOL/L (ref 98–107)
CO2 SERPL-SCNC: 17 MMOL/L (ref 22–29)
CREAT SERPL-MCNC: 0.7 MG/DL (ref 0.5–1)
EKG ATRIAL RATE: 100 BPM
EKG P AXIS: 41 DEGREES
EKG P-R INTERVAL: 170 MS
EKG Q-T INTERVAL: 344 MS
EKG QRS DURATION: 82 MS
EKG QTC CALCULATION (BAZETT): 443 MS
EKG R AXIS: 10 DEGREES
EKG T AXIS: 18 DEGREES
EKG VENTRICULAR RATE: 100 BPM
EOSINOPHIL # BLD: 0.22 K/UL (ref 0.05–0.5)
EOSINOPHILS RELATIVE PERCENT: 3 % (ref 0–6)
ERYTHROCYTE [DISTWIDTH] IN BLOOD BY AUTOMATED COUNT: 13.1 % (ref 11.5–15)
GFR SERPL CREATININE-BSD FRML MDRD: >60 ML/MIN/1.73M2
GLUCOSE BLD-MCNC: 149 MG/DL (ref 74–99)
GLUCOSE SERPL-MCNC: 131 MG/DL (ref 74–99)
HCT VFR BLD AUTO: 23.3 % (ref 34–48)
HCT VFR BLD AUTO: 23.3 % (ref 34–48)
HGB BLD-MCNC: 7.9 G/DL (ref 11.5–15.5)
HGB BLD-MCNC: 8.1 G/DL (ref 11.5–15.5)
IMM GRANULOCYTES # BLD AUTO: 0.03 K/UL (ref 0–0.58)
IMM GRANULOCYTES NFR BLD: 1 % (ref 0–5)
LYMPHOCYTES NFR BLD: 0.61 K/UL (ref 1.5–4)
LYMPHOCYTES RELATIVE PERCENT: 9 % (ref 20–42)
MAGNESIUM SERPL-MCNC: 1.8 MG/DL (ref 1.6–2.6)
MCH RBC QN AUTO: 31.5 PG (ref 26–35)
MCHC RBC AUTO-ENTMCNC: 33.9 G/DL (ref 32–34.5)
MCV RBC AUTO: 92.8 FL (ref 80–99.9)
MONOCYTES NFR BLD: 0.45 K/UL (ref 0.1–0.95)
MONOCYTES NFR BLD: 7 % (ref 2–12)
NEUTROPHILS NFR BLD: 79 % (ref 43–80)
NEUTS SEG NFR BLD: 5.13 K/UL (ref 1.8–7.3)
PHOSPHATE SERPL-MCNC: 2.4 MG/DL (ref 2.5–4.5)
PLATELET # BLD AUTO: 162 K/UL (ref 130–450)
PMV BLD AUTO: 9.6 FL (ref 7–12)
POTASSIUM SERPL-SCNC: 3.4 MMOL/L (ref 3.5–5)
PROT SERPL-MCNC: 5.1 G/DL (ref 6.4–8.3)
RBC # BLD AUTO: 2.51 M/UL (ref 3.5–5.5)
SODIUM SERPL-SCNC: 137 MMOL/L (ref 132–146)
WBC OTHER # BLD: 6.5 K/UL (ref 4.5–11.5)

## 2024-01-07 PROCEDURE — 36415 COLL VENOUS BLD VENIPUNCTURE: CPT

## 2024-01-07 PROCEDURE — C9113 INJ PANTOPRAZOLE SODIUM, VIA: HCPCS | Performed by: STUDENT IN AN ORGANIZED HEALTH CARE EDUCATION/TRAINING PROGRAM

## 2024-01-07 PROCEDURE — 6370000000 HC RX 637 (ALT 250 FOR IP)

## 2024-01-07 PROCEDURE — 6360000002 HC RX W HCPCS: Performed by: STUDENT IN AN ORGANIZED HEALTH CARE EDUCATION/TRAINING PROGRAM

## 2024-01-07 PROCEDURE — 6370000000 HC RX 637 (ALT 250 FOR IP): Performed by: TRANSPLANT SURGERY

## 2024-01-07 PROCEDURE — 82248 BILIRUBIN DIRECT: CPT

## 2024-01-07 PROCEDURE — 83735 ASSAY OF MAGNESIUM: CPT

## 2024-01-07 PROCEDURE — 84100 ASSAY OF PHOSPHORUS: CPT

## 2024-01-07 PROCEDURE — 6370000000 HC RX 637 (ALT 250 FOR IP): Performed by: INTERNAL MEDICINE

## 2024-01-07 PROCEDURE — 97535 SELF CARE MNGMENT TRAINING: CPT

## 2024-01-07 PROCEDURE — 85014 HEMATOCRIT: CPT

## 2024-01-07 PROCEDURE — 82962 GLUCOSE BLOOD TEST: CPT

## 2024-01-07 PROCEDURE — 80053 COMPREHEN METABOLIC PANEL: CPT

## 2024-01-07 PROCEDURE — 85018 HEMOGLOBIN: CPT

## 2024-01-07 PROCEDURE — 1200000000 HC SEMI PRIVATE

## 2024-01-07 PROCEDURE — 99232 SBSQ HOSP IP/OBS MODERATE 35: CPT | Performed by: TRANSPLANT SURGERY

## 2024-01-07 PROCEDURE — 85025 COMPLETE CBC W/AUTO DIFF WBC: CPT

## 2024-01-07 RX ADMIN — POTASSIUM CHLORIDE 40 MEQ: 1500 TABLET, EXTENDED RELEASE ORAL at 19:33

## 2024-01-07 RX ADMIN — DIBASIC SODIUM PHOSPHATE, MONOBASIC POTASSIUM PHOSPHATE AND MONOBASIC SODIUM PHOSPHATE 2 TABLET: 852; 155; 130 TABLET ORAL at 10:09

## 2024-01-07 RX ADMIN — ASPIRIN 81 MG: 81 TABLET, COATED ORAL at 20:26

## 2024-01-07 RX ADMIN — HYDROCODONE BITARTRATE AND ACETAMINOPHEN 1 TABLET: 5; 325 TABLET ORAL at 01:24

## 2024-01-07 RX ADMIN — CYCLOBENZAPRINE HYDROCHLORIDE 5 MG: 5 TABLET, FILM COATED ORAL at 01:24

## 2024-01-07 RX ADMIN — DIBASIC SODIUM PHOSPHATE, MONOBASIC POTASSIUM PHOSPHATE AND MONOBASIC SODIUM PHOSPHATE 2 TABLET: 852; 155; 130 TABLET ORAL at 20:26

## 2024-01-07 RX ADMIN — ANTI-FUNGAL POWDER MICONAZOLE NITRATE TALC FREE: 1.42 POWDER TOPICAL at 20:31

## 2024-01-07 RX ADMIN — CYCLOBENZAPRINE HYDROCHLORIDE 5 MG: 5 TABLET, FILM COATED ORAL at 10:14

## 2024-01-07 RX ADMIN — ASPIRIN 81 MG: 81 TABLET, COATED ORAL at 10:10

## 2024-01-07 RX ADMIN — PANTOPRAZOLE SODIUM 40 MG: 40 INJECTION, POWDER, FOR SOLUTION INTRAVENOUS at 20:26

## 2024-01-07 RX ADMIN — PANTOPRAZOLE SODIUM 40 MG: 40 INJECTION, POWDER, FOR SOLUTION INTRAVENOUS at 10:09

## 2024-01-07 RX ADMIN — ANTI-FUNGAL POWDER MICONAZOLE NITRATE TALC FREE 1 APPLICATION: 1.42 POWDER TOPICAL at 10:11

## 2024-01-07 ASSESSMENT — PAIN SCALES - GENERAL
PAINLEVEL_OUTOF10: 9
PAINLEVEL_OUTOF10: 0
PAINLEVEL_OUTOF10: 0

## 2024-01-07 ASSESSMENT — PAIN - FUNCTIONAL ASSESSMENT: PAIN_FUNCTIONAL_ASSESSMENT: PREVENTS OR INTERFERES SOME ACTIVE ACTIVITIES AND ADLS

## 2024-01-07 ASSESSMENT — PAIN DESCRIPTION - LOCATION: LOCATION: ANKLE

## 2024-01-07 ASSESSMENT — PAIN DESCRIPTION - ORIENTATION: ORIENTATION: RIGHT

## 2024-01-07 NOTE — PLAN OF CARE
Problem: Discharge Planning  Goal: Discharge to home or other facility with appropriate resources  Outcome: Progressing     Problem: ABCDS Injury Assessment  Goal: Absence of physical injury  Outcome: Progressing     Problem: Safety - Adult  Goal: Free from fall injury  Outcome: Progressing     Problem: Pain  Goal: Verbalizes/displays adequate comfort level or baseline comfort level  Outcome: Progressing     Problem: Nutrition Deficit:  Goal: Optimize nutritional status  Outcome: Progressing     Problem: Skin/Tissue Integrity  Goal: Absence of new skin breakdown  Description: 1.  Monitor for areas of redness and/or skin breakdown  2.  Assess vascular access sites hourly  3.  Every 4-6 hours minimum:  Change oxygen saturation probe site  4.  Every 4-6 hours:  If on nasal continuous positive airway pressure, respiratory therapy assess nares and determine need for appliance change or resting period.  Outcome: Progressing

## 2024-01-07 NOTE — PLAN OF CARE
Problem: Discharge Planning  Goal: Discharge to home or other facility with appropriate resources  1/7/2024 1035 by Shonda Hooks RN  Outcome: Progressing     Problem: ABCDS Injury Assessment  Goal: Absence of physical injury  1/7/2024 1035 by Shonda Hooks RN  Outcome: Progressing     Problem: Safety - Adult  Goal: Free from fall injury  1/7/2024 1035 by Shonda Hooks RN  Outcome: Progressing     Problem: Pain  Goal: Verbalizes/displays adequate comfort level or baseline comfort level  1/7/2024 1035 by Shonda Hooks RN  Outcome: Progressing     Problem: Nutrition Deficit:  Goal: Optimize nutritional status  1/7/2024 1035 by Shonda Hooks RN  Outcome: Progressing     Problem: Skin/Tissue Integrity  Goal: Absence of new skin breakdown  Description: 1.  Monitor for areas of redness and/or skin breakdown  2.  Assess vascular access sites hourly  3.  Every 4-6 hours minimum:  Change oxygen saturation probe site  4.  Every 4-6 hours:  If on nasal continuous positive airway pressure, respiratory therapy assess nares and determine need for appliance change or resting period.  1/7/2024 1035 by Shonda Hooks RN  Outcome: Progressing

## 2024-01-08 VITALS
SYSTOLIC BLOOD PRESSURE: 106 MMHG | BODY MASS INDEX: 45.63 KG/M2 | HEIGHT: 61 IN | WEIGHT: 241.7 LBS | OXYGEN SATURATION: 98 % | HEART RATE: 85 BPM | RESPIRATION RATE: 18 BRPM | DIASTOLIC BLOOD PRESSURE: 60 MMHG | TEMPERATURE: 98.1 F

## 2024-01-08 LAB
ALBUMIN SERPL-MCNC: 2.7 G/DL (ref 3.5–5.2)
ALP SERPL-CCNC: 114 U/L (ref 35–104)
ALT SERPL-CCNC: 130 U/L (ref 0–32)
ANION GAP SERPL CALCULATED.3IONS-SCNC: 10 MMOL/L (ref 7–16)
AST SERPL-CCNC: 72 U/L (ref 0–31)
BASOPHILS # BLD: 0.01 K/UL (ref 0–0.2)
BASOPHILS NFR BLD: 0 % (ref 0–2)
BILIRUB DIRECT SERPL-MCNC: 0.3 MG/DL (ref 0–0.3)
BILIRUB INDIRECT SERPL-MCNC: 0.3 MG/DL (ref 0–1)
BILIRUB SERPL-MCNC: 0.6 MG/DL (ref 0–1.2)
BUN SERPL-MCNC: 12 MG/DL (ref 6–23)
CALCIUM SERPL-MCNC: 8 MG/DL (ref 8.6–10.2)
CHLORIDE SERPL-SCNC: 105 MMOL/L (ref 98–107)
CO2 SERPL-SCNC: 19 MMOL/L (ref 22–29)
CREAT SERPL-MCNC: 0.6 MG/DL (ref 0.5–1)
EOSINOPHIL # BLD: 0.1 K/UL (ref 0.05–0.5)
EOSINOPHILS RELATIVE PERCENT: 2 % (ref 0–6)
ERYTHROCYTE [DISTWIDTH] IN BLOOD BY AUTOMATED COUNT: 13.2 % (ref 11.5–15)
GFR SERPL CREATININE-BSD FRML MDRD: >60 ML/MIN/1.73M2
GLUCOSE SERPL-MCNC: 122 MG/DL (ref 74–99)
HCT VFR BLD AUTO: 23.4 % (ref 34–48)
HGB BLD-MCNC: 8 G/DL (ref 11.5–15.5)
IMM GRANULOCYTES # BLD AUTO: 0.03 K/UL (ref 0–0.58)
IMM GRANULOCYTES NFR BLD: 0 % (ref 0–5)
LYMPHOCYTES NFR BLD: 0.86 K/UL (ref 1.5–4)
LYMPHOCYTES RELATIVE PERCENT: 13 % (ref 20–42)
MAGNESIUM SERPL-MCNC: 1.9 MG/DL (ref 1.6–2.6)
MCH RBC QN AUTO: 30.9 PG (ref 26–35)
MCHC RBC AUTO-ENTMCNC: 34.2 G/DL (ref 32–34.5)
MCV RBC AUTO: 90.3 FL (ref 80–99.9)
MONOCYTES NFR BLD: 0.6 K/UL (ref 0.1–0.95)
MONOCYTES NFR BLD: 9 % (ref 2–12)
NEUTROPHILS NFR BLD: 77 % (ref 43–80)
NEUTS SEG NFR BLD: 5.29 K/UL (ref 1.8–7.3)
PHOSPHATE SERPL-MCNC: 3.4 MG/DL (ref 2.5–4.5)
PLATELET # BLD AUTO: 154 K/UL (ref 130–450)
PMV BLD AUTO: 9.4 FL (ref 7–12)
POTASSIUM SERPL-SCNC: 3.9 MMOL/L (ref 3.5–5)
PROT SERPL-MCNC: 5.4 G/DL (ref 6.4–8.3)
RBC # BLD AUTO: 2.59 M/UL (ref 3.5–5.5)
SODIUM SERPL-SCNC: 134 MMOL/L (ref 132–146)
WBC OTHER # BLD: 6.9 K/UL (ref 4.5–11.5)

## 2024-01-08 PROCEDURE — 84100 ASSAY OF PHOSPHORUS: CPT

## 2024-01-08 PROCEDURE — 6370000000 HC RX 637 (ALT 250 FOR IP): Performed by: INTERNAL MEDICINE

## 2024-01-08 PROCEDURE — 97110 THERAPEUTIC EXERCISES: CPT

## 2024-01-08 PROCEDURE — 80053 COMPREHEN METABOLIC PANEL: CPT

## 2024-01-08 PROCEDURE — 82248 BILIRUBIN DIRECT: CPT

## 2024-01-08 PROCEDURE — 83735 ASSAY OF MAGNESIUM: CPT

## 2024-01-08 PROCEDURE — C9113 INJ PANTOPRAZOLE SODIUM, VIA: HCPCS | Performed by: STUDENT IN AN ORGANIZED HEALTH CARE EDUCATION/TRAINING PROGRAM

## 2024-01-08 PROCEDURE — 6360000002 HC RX W HCPCS: Performed by: STUDENT IN AN ORGANIZED HEALTH CARE EDUCATION/TRAINING PROGRAM

## 2024-01-08 PROCEDURE — 85025 COMPLETE CBC W/AUTO DIFF WBC: CPT

## 2024-01-08 PROCEDURE — 36415 COLL VENOUS BLD VENIPUNCTURE: CPT

## 2024-01-08 RX ORDER — HYDROCODONE BITARTRATE AND ACETAMINOPHEN 5; 325 MG/1; MG/1
1 TABLET ORAL EVERY 6 HOURS PRN
Qty: 42 TABLET | Refills: 0 | Status: SHIPPED | OUTPATIENT
Start: 2024-01-08 | End: 2024-01-15

## 2024-01-08 RX ORDER — PANTOPRAZOLE SODIUM 40 MG/1
40 TABLET, DELAYED RELEASE ORAL
Qty: 30 TABLET | Refills: 0 | Status: SHIPPED | OUTPATIENT
Start: 2024-01-08

## 2024-01-08 RX ADMIN — ASPIRIN 81 MG: 81 TABLET, COATED ORAL at 08:43

## 2024-01-08 RX ADMIN — PANTOPRAZOLE SODIUM 40 MG: 40 INJECTION, POWDER, FOR SOLUTION INTRAVENOUS at 08:44

## 2024-01-08 NOTE — PLAN OF CARE
Problem: Discharge Planning  Goal: Discharge to home or other facility with appropriate resources  1/8/2024 0847 by Dio Duque RN  Outcome: Progressing     Problem: ABCDS Injury Assessment  Goal: Absence of physical injury  1/8/2024 0847 by Dio Duque RN  Outcome: Progressing     Problem: Safety - Adult  Goal: Free from fall injury  1/8/2024 0847 by Dio Duque RN  Outcome: Progressing     Problem: Pain  Goal: Verbalizes/displays adequate comfort level or baseline comfort level  1/8/2024 0847 by Dio Duque RN  Outcome: Progressing     Problem: Nutrition Deficit:  Goal: Optimize nutritional status  1/8/2024 0847 by Dio Duque RN  Outcome: Progressing     Problem: Skin/Tissue Integrity  Goal: Absence of new skin breakdown  Description: 1.  Monitor for areas of redness and/or skin breakdown  2.  Assess vascular access sites hourly  3.  Every 4-6 hours minimum:  Change oxygen saturation probe site  4.  Every 4-6 hours:  If on nasal continuous positive airway pressure, respiratory therapy assess nares and determine need for appliance change or resting period.  1/8/2024 0847 by Dio Duque, RN  Outcome: Progressing

## 2024-01-08 NOTE — PROGRESS NOTES
Pharmacist Review and Automatic Dose Adjustment of Prophylactic Enoxaparin         The reviewing pharmacist has made an adjustment to the ordered enoxaparin dose or converted to UFH per the approved [facility] protocol and table as identified below.        Tere Lewis is a 73 y.o. female.     Recent Labs     01/02/24  1501   CREATININE 0.6       Estimated Creatinine Clearance: 91 mL/min (based on SCr of 0.6 mg/dL).    Recent Labs     01/02/24  1501   HGB 13.4   HCT 40.5        No results for input(s): \"INR\" in the last 72 hours.    Height:   Ht Readings from Last 1 Encounters:   01/02/24 1.549 m (5' 1\")     Weight:  Wt Readings from Last 1 Encounters:   01/02/24 105.9 kg (233 lb 8 oz)               Plan: Based upon the patient's weight and renal function    Ordered: Enoxaparin 40mg SUBQ Daily    Changed/converted to    New Order: Enoxaparin 30mg SUBQ BID      Thank you,  Chong De Paz [facility]  1/2/2024, 11:00 PM   
4 Eyes Skin Assessment     NAME:  Tere Lewis  YOB: 1950  MEDICAL RECORD NUMBER:  [id number]    The patient is being assessed for  Admission    I agree that at least one RN has performed a thorough Head to Toe Skin Assessment on the patient. ALL assessment sites listed below have been assessed.      Areas assessed by both nurses:    Head, Face, Ears, Shoulders, Back, Chest, Arms, Elbows, Hands, Sacrum. Buttock, Coccyx, Ischium, and Legs. Feet and Heels        Does the Patient have a Wound? No noted wound(s)       Abdifatah Prevention initiated by RN: No  Wound Care Orders initiated by RN: No    Pressure Injury (Stage 3,4, Unstageable, DTI, NWPT, and Complex wounds) if present, place Wound referral order by RN under : No    New Ostomies, if present place, Ostomy referral order under : No     Nurse 1 eSignature: Electronically signed by Erwin Gonzales RN on 1/3/24 at 2:06 AM EST    **SHARE this note so that the co-signing nurse can place an eSignature**    Nurse 2 eSignature: Electronically signed by Joni Sandhu RN on 1/3/24 at 2:09 AM EST  2  
[facility]  Hematology/Oncology  Consult      Patient Name: Tere Lewis  YOB: 1950  PCP: Awadalla, Bahaa A, MD   Referring Provider:      Reason for Consultation:   Chief Complaint   Patient presents with    Back Pain     Started month ago, hx of sciatica pain, getting worse    Ankle Pain     Pain and swelling in R ankle, trouble walking         History of Present Illness:  73-year-old woman with past medical history relevant for hypertension, degenerative arthritis and lumbar radiculopathy.  She was admitted with ankle fracture and sciatica and noted to have elevated lipase level.  CT scan of abdomen and pelvis showed an expansile lobulated pancreatic head mass measuring 5.3 x 5.1 cm causing obstruction of pancreatic duct.  Pancreatic duct was dilated and measured 2.1 cm.  There was no dilatation of intrahepatic biliary tree.  Peripancreatic fat planes were preserved.  The lesion causes abutment of superior mesenteric vein with partial encasement of proximal main portal vein but not obstructing the portal venous system.  Incidental 1.5 cm hypodense lesion was noted in the right adrenal gland.  There were 2 appearing benign left kidney cysts.  No retroperitoneal lymphadenopathy.  CT scan of the chest showed no evidence of intrathoracic metastatic disease.  Patient seen by hepatobiliary surgery Dr. Neftali VILLAGOMEZ consulted for EUS and biopsy for tissue diagnosis.  CMP shows normal renal function with a serum creatinine of 0.6.  Hepatic panel relevant for minimally elevated AST at 35 with normal total bilirubin and normal alkaline phosphatase.  Serum lipase was markedly elevated at 1618  CBC in the normal range  CEA and  were normal.  CA 19-9 pending    Diagnostic Data:     Past Medical History:   Diagnosis Date    Heart murmur     Hypertension     Lymphedema     Metabolic syndrome     Osteoarthritis     Osteopenia     Pinched nerve        Patient Active Problem List    Diagnosis Date 
[facility]  Hematology/Oncology  Consult      Patient Name: Tere Lewis  YOB: 1950  PCP: Awadalla, Bahaa A, MD   Referring Provider:      Reason for Consultation:   Chief Complaint   Patient presents with    Back Pain     Started month ago, hx of sciatica pain, getting worse    Ankle Pain     Pain and swelling in R ankle, trouble walking         History of Present Illness:  73-year-old woman with past medical history relevant for hypertension, degenerative arthritis and lumbar radiculopathy.  She was admitted with ankle fracture and sciatica and noted to have elevated lipase level.  CT scan of abdomen and pelvis showed an expansile lobulated pancreatic head mass measuring 5.3 x 5.1 cm causing obstruction of pancreatic duct.  Pancreatic duct was dilated and measured 2.1 cm.  There was no dilatation of intrahepatic biliary tree.  Peripancreatic fat planes were preserved.  The lesion causes abutment of superior mesenteric vein with partial encasement of proximal main portal vein but not obstructing the portal venous system.  Incidental 1.5 cm hypodense lesion was noted in the right adrenal gland.  There were 2 appearing benign left kidney cysts.  No retroperitoneal lymphadenopathy.  CT scan of the chest showed no evidence of intrathoracic metastatic disease.  Patient seen by hepatobiliary surgery Dr. Neftali VILLAGOMEZ consulted for EUS and biopsy for tissue diagnosis.  CMP shows normal renal function with a serum creatinine of 0.6.  Hepatic panel relevant for minimally elevated AST at 35 with normal total bilirubin and normal alkaline phosphatase.  Serum lipase was markedly elevated at 1618  CBC in the normal range  CEA and  were normal.  CA 19-9 pending    Diagnostic Data:     Past Medical History:   Diagnosis Date    Heart murmur     Hypertension     Lymphedema     Metabolic syndrome     Osteoarthritis     Osteopenia     Pinched nerve        Patient Active Problem List    Diagnosis Date 
Comprehensive Nutrition Assessment    Type and Reason for Visit:  Initial, Positive Nutrition Screen (NPO diet day 3)    Nutrition Recommendations/Plan:   Continue NPO status  Monitor nutrition progression and provide recommendations as indicated/medically appropriate     Malnutrition Assessment:  Malnutrition Status:  Insufficient data (FLAKITA d/t pt in surgery and NPO status) (01/05/24 1236)    Context:  Acute Illness     Findings of the 6 clinical characteristics of malnutrition:  Energy Intake:  Unable to assess (FLAKITA d/t pt in surgery and NPO status)  Weight Loss:  No significant weight loss     Body Fat Loss:  Unable to assess     Muscle Mass Loss:  Unable to assess    Fluid Accumulation:  Unable to assess     Strength:  Not Performed    Nutrition Assessment:    Pt admit for pain right ankle fx. Pt was found to have a pancreatic head mass during CT scan. Pmhx: Lypmhedema, heart murmer, HTN, Metabolic syndrome, Osteopenia, Sciatica, Bilateral nueropathy. Pt has been NPO, clear liquids x3 days. Pt NPO today for EUS w/ pancreatic mass biopsy. results pending. Continue NPO, continue to  monitor nutrition progression and provide recommendations as indicated/medically appropriate, f/up per policy.    Nutrition Related Findings:    A/O x4, I/O -1613, abd wdl, bowel sounds hypoX4, BLLE edema +3, E'lytes wnl, Hgb 9.6, Hct 28.5, Lipase 1,618 Wound Type: None       Current Nutrition Intake & Therapies:    Average Meal Intake: NPO  Average Supplements Intake: None Ordered  Diet NPO Exceptions are: Sips of Water with Meds    Anthropometric Measures:  Height: 154.9 cm (5' 0.98\")  Ideal Body Weight (IBW): 105 lbs (48 kg)    Admission Body Weight: 109.6 kg (241 lb 10 oz)  Current Body Weight: 109.6 kg (241 lb 10 oz), 230.1 % IBW. Weight Source: Not Specified (01/04/2024)  Current BMI (kg/m2): 45.7  Usual Body Weight: 102.1 kg (225 lb 1.4 oz) (03/07/23)  % Weight Change (Calculated): 7.3  Weight Adjustment For: No Adjustment   
General Surgery Progress Note  [facility]      Patient's Name/Date of Birth: Tere Lewis / 1950    Date: January 5, 2024     Surgeon: MD Chris    Chief Complaint: pancreatic mass    Patient Active Problem List   Diagnosis    DJD (degenerative joint disease) of knee    Primary osteoarthritis of both knees    DDD (degenerative disc disease), lumbar    Lumbosacral radiculopathy at L4    Lumbar spinal stenosis    Tibial plateau fracture, left, closed, initial encounter    Pancreatic cancer (HCC)    Pancreatic mass    Sciatica of left side    Closed fracture of right ankle       Subjective: no acute events overnight, for EUS w/ FNA today    Objective:  /65   Pulse 76   Temp 98.8 °F (37.1 °C) (Oral)   Resp 17   Ht 1.549 m (5' 1\")   Wt 109.6 kg (241 lb 11.2 oz)   SpO2 97%   BMI 45.67 kg/m²   Labs:  Recent Labs     01/02/24  1501 01/04/24  0416 01/05/24  0359   WBC 9.0 6.5 6.2   HGB 13.4 11.3* 10.2*   HCT 40.5 33.3* 30.2*     Lab Results   Component Value Date    CREATININE 0.6 01/05/2024    BUN 8 01/05/2024     01/05/2024    K 3.6 01/05/2024     (H) 01/05/2024    CO2 21 (L) 01/05/2024     Recent Labs     01/02/24  1501   LIPASE 1,618*         General appearance:  NAD  Head: NCAT, PERRLA, EOMI, red conjunctiva  Neck: supple, no masses  Lungs: CTAB, equal chest rise bilateral  Heart: Reg rate  Abdomen: soft, nondistended, non tender   Skin; no lesions  Gu: no cva tenderness  Extremities: extremities normal, atraumatic, no cyanosis or edema      Assessment/Plan:  Tere Lewis is a 73 y.o. female with incidental finding of 5 cm cystic pancreatic head mass with pancreatic duct obstruction and dilation without biliary obstruction.  Primary malignancy versus serous cystic neoplasm  CEA 1.5  : 4    -EUS with FNA today  -N.p.o. for procedure  -Okay to restart diet after procedure.    Electronically signed by Amie Sue MD on 1/5/2024 at 7:59 AM      
HEPATOBILIARY AND PANCREATIC SURGERY  DAILY PROGRESS NOTE  1/5/2024    Chief Complaint   Patient presents with    Back Pain     Started month ago, hx of sciatica pain, getting worse    Ankle Pain     Pain and swelling in R ankle, trouble walking        Subjective:  No acute events overnight, for EUS with FNA today.    Objective:  /65   Pulse 76   Temp 98.8 °F (37.1 °C) (Oral)   Resp 17   Ht 1.549 m (5' 1\")   Wt 109.6 kg (241 lb 11.2 oz)   SpO2 97%   BMI 45.67 kg/m²     GENERAL:  Laying in bed, awake, alert, cooperative, no apparent distress  HEAD: Normocephalic, atraumatic  EYES: No sclera icterus, pupils equal  LUNGS:  No increased work of breathing  CARDIOVASCULAR:  RR  ABDOMEN:  Soft, non-tender, non-distended  SKIN: Warm and dry    Assessment/Plan:  73 y.o. female  with incidental finding of 5 cm cystic pancreatic head mass with pancreatic duct obstruction and dilation without biliary obstruction.  Primary malignancy versus serous cystic neoplasm  CEA 1.5  : 4     -EUS with FNA today  -N.p.o. for procedure  -Okay to restart diet after procedure.    Electronically signed by Amie Sue MD on 1/5/2024 at 8:04 AM      Attending Physician Statement:    Chief Complaint:   Chief Complaint   Patient presents with    Back Pain     Started month ago, hx of sciatica pain, getting worse    Ankle Pain     Pain and swelling in R ankle, trouble walking        I have examined the patient and performed the key aspects of physical exam, reviewed the record (including all pertinent and new radiology images and laboratory findings), and discussed the case with the surgical team.  I agree with the assessment and plan with the following additions, corrections, and changes. 14pt review of symptoms completed and negative except as mentioned.    EUS done this afternoon. Mass biopsied. Will need path prior to making determination for treatment. MRI findings noted however, still concern for malignant process 
HEPATOBILIARY AND PANCREATIC SURGERY  DAILY PROGRESS NOTE  1/6/2024    Chief Complaint   Patient presents with    Back Pain     Started month ago, hx of sciatica pain, getting worse    Ankle Pain     Pain and swelling in R ankle, trouble walking        Subjective:  Patient states that she is having no abdominal pain EUS with biopsy.  She did have some bleeding from the GDA postbiopsy.  Objective:  BP (!) 97/45   Pulse 91   Temp 98.8 °F (37.1 °C) (Oral)   Resp 18   Ht 1.549 m (5' 0.98\")   Wt 109.6 kg (241 lb 11.2 oz)   SpO2 98%   BMI 45.69 kg/m²     GENERAL:  Laying in bed, awake, alert, cooperative, no apparent distress  HEAD: Normocephalic, atraumatic  EYES: No sclera icterus, pupils equal  LUNGS:  No increased work of breathing  CARDIOVASCULAR:  RR  ABDOMEN:  Soft, non-tender, non-distended  SKIN: Warm and dry    Assessment/Plan:  73 y.o. female  with incidental finding of 5 cm cystic pancreatic head mass with pancreatic duct obstruction and dilation without biliary obstruction.  Primary malignancy versus serous cystic neoplasm  CEA 1.5  : 4     -Continue to trend her hemoglobin  -Bilirubin being elevated is likely secondary to compression  -Will order a triphasic CT scan of her pancreas to rule out a pseudoaneurysm.  -If she were to have a pseudoaneurysm this will require embolization    Orestes Ovalle MD  01/06/24  9:59 AM    
HEPATOBILIARY AND PANCREATIC SURGERY  DAILY PROGRESS NOTE  1/7/2024    Chief Complaint   Patient presents with    Back Pain     Started month ago, hx of sciatica pain, getting worse    Ankle Pain     Pain and swelling in R ankle, trouble walking        Subjective:  Patient states that she is feeling well.  She only has some abdominal discomfort when moving.  She did not have worsening abdominal pain with a clear liquid diet.  She underwent a triphasic CT scan yesterday due to postbiopsy bleeding from her endoscopic ultrasound.    Objective:  BP (!) 114/47   Pulse 84   Temp 99.1 °F (37.3 °C) (Oral)   Resp 17   Ht 1.549 m (5' 0.98\")   Wt 109.6 kg (241 lb 11.2 oz)   SpO2 97%   BMI 45.69 kg/m²     GENERAL:  Laying in bed, awake, alert, cooperative, no apparent distress  HEAD: Normocephalic, atraumatic  EYES: No sclera icterus, pupils equal  LUNGS:  No increased work of breathing  CARDIOVASCULAR:  RR  ABDOMEN:  Soft, non-tender, non-distended  SKIN: Warm and dry    Assessment/Plan:  73 y.o. female  with incidental finding of 5 cm cystic pancreatic head mass with pancreatic duct obstruction and dilation without biliary obstruction.  Primary malignancy versus serous cystic neoplasm  CEA 1.5  : 4  Chromogranin A: 161     -Triphasic CT scan reviewed, no evidence of active extravasation or blush  -Hemoglobin is slowly down trended but appears to be stabilizing  -There is a hematoma over the biopsy was taken.  -Advance diet  -Replace electrolytes    Orestes Ovalle MD  01/07/24  7:39 AM    
[facility] Surgery    Triphasic CT scan reviewed  Awaiting official read  I do not see active extravasation in the pancreatic head however she does have evidence of a hematoma.  Continue to monitor hemoglobin    Electronically signed by Orestes Ovalle MD on 1/6/2024 at 1:00 PM    
Hepatobiliary and Pancreatic Surgery Progress Note    Chief Complaint:        Chief Complaint   Patient presents with    Back Pain       Started month ago, hx of sciatica pain, getting worse    Ankle Pain       Pain and swelling in R ankle, trouble walking        Subjective: Patient states she feels better today, no pain in foot with it immobilized. Working with PT currently. Denies abd pain, n/v. States she is passing flatus, no BM since Thursday.     OBJECTIVE      Physical    /70   Pulse 69   Temp 97.9 °F (36.6 °C) (Oral)   Resp 16   Ht 1.549 m (5' 1\")   Wt 109.6 kg (241 lb 11.2 oz)   SpO2 100%   BMI 45.67 kg/m²       General appearance: appears in no acute distress  Lungs:respiratory effort normal without accessory numbers  Heart: no pedal edema  Abdomen: soft, nondistended, nontympanic, no guarding, no peritoneal signs, normoactive bowel sounds  Extremities: lymphedema ble    CT ABDOMEN WITH AND WITHOUT CONTRAST 1/3/2024 11:52 am     TECHNIQUE:  CT of the abdomen was performed without and with the administration of  intravenous contrast. Multiplanar reformatted images are provided for review.  Automated exposure control, iterative reconstruction, and/or weight based  adjustment of the mA/kV was utilized to reduce the radiation dose to as low  as reasonably achievable.     COMPARISON:  None.     HISTORY:  ORDERING SYSTEM PROVIDED HISTORY: pancreatic head mass, eval vascular  involvement  TECHNOLOGIST PROVIDED HISTORY:  BODY RADIOLOGIST TO READ  Reason for exam:->pancreatic head mass, eval vascular involvement  Additional Contrast?->1     FINDINGS:  As seen on recent CT scan abdomen and pelvis there is a expansile mass lesion  in the area the head the pancreas.  When it is measured a combination of  coronal sagittal images of the arterial phase it has 5.3 cm sup-inf x 5.4 cm  transv x 5.1 cm AP. Diameter.     Pancreatic lesion density/enhancement pattern:     Precontrast: homogeneous relative 
Internal Medicine Progress Note    [facility]    Jamil Stein D.O., DOREEN Robles D.O., DOREEN Fishman D.O.       Judie Zaragoza, MSN, APRN, NP-C  Elgin Dorantes, MSN, APRN-CNP  Chong Vargas, MSN, APRN-CNP     Primary Care Physician: Awadalla, Bahaa A, MD   Admitting Physician:  Ryan Robles DO  Admission date and time: 1/2/2024 11:26 AM    Room:  [address] [place] [address]  Admitting diagnosis: Pancreatic cancer (HCC) [C25.9]  Pancreatic mass [K86.89]  Sciatica of left side [M54.32]  Closed fracture of right ankle, initial encounter [S82.891A]  Acute pancreatitis, unspecified complication status, unspecified pancreatitis type [K85.90]    Patient Name: Tere Lewis  MRN: [id number]    Date of Service: 1/6/2024     Subjective:  Tere is a 73 y.o. female who was seen and examined today,1/6/2024, at the bedside.  Patient appeared to be weak and debilitated but seems to be improved.  Patient blood pressure did fluctuate last evening but has improved this morning.  Appreciate input of surgery and triphasic scan has been ordered.  Hemoglobin has dropped slightly.  Overall she appears be resting comfortably    No family member present      Review of System:   Constitutional:   Denies fever or chills, weight loss or gain, fatigue or malaise.  HEENT:   Denies ear pain, sore throat, sinus or eye problems.  Cardiovascular:   Denies any chest pain, irregular heartbeats, or palpitations.   Respiratory:   Denies shortness of breath, coughing, sputum production, hemoptysis, or wheezing.  Gastrointestinal:   Denies nausea, vomiting, diarrhea, or constipation.  Denies any abdominal pain.  Genitourinary:    Denies any urgency, frequency, hematuria. Voiding  without difficulty.  Extremities:   Denies lower extremity swelling, edema or cyanosis.  Right leg splint  Neurology:    Denies any headache or focal neurological deficits, profound weakness 
Internal Medicine Progress Note    [facility]    Jamil Stein D.O., DOREEN Robles D.O., DOREEN Fishman D.O.       Judie Zaragoza, MSN, APRN, NP-C  Elgin Dorantes, MSN, APRN-CNP  Chong Vargas, MSN, APRN-CNP     Primary Care Physician: Awadalla, Bahaa A, MD   Admitting Physician:  Ryan Robles DO  Admission date and time: 1/2/2024 11:26 AM    Room:  [address] [place] [address]  Admitting diagnosis: Pancreatic cancer (HCC) [C25.9]  Pancreatic mass [K86.89]  Sciatica of left side [M54.32]  Closed fracture of right ankle, initial encounter [S82.891A]  Acute pancreatitis, unspecified complication status, unspecified pancreatitis type [K85.90]    Patient Name: Tere Lewis  MRN: [id number]    Date of Service: 1/7/2024     Subjective:  Tere is a 73 y.o. female who was seen and examined today,1/7/2024, at the bedside.  Patient doing better today.  Diet has been progress with surgery.  Hemoglobin dropped slightly we will repeat later today.  Continue work with physical therapy and waiting for precertification    No family member present      Review of System:   Constitutional:   Denies fever or chills, weight loss or gain, fatigue or malaise.  HEENT:   Denies ear pain, sore throat, sinus or eye problems.  Cardiovascular:   Denies any chest pain, irregular heartbeats, or palpitations.   Respiratory:   Denies shortness of breath, coughing, sputum production, hemoptysis, or wheezing.  Gastrointestinal:   Denies nausea, vomiting, diarrhea, or constipation.  Denies any abdominal pain.  Genitourinary:    Denies any urgency, frequency, hematuria. Voiding  without difficulty.  Extremities:   Denies lower extremity swelling, edema or cyanosis.  Right leg splint  Neurology:    Denies any headache or focal neurological deficits, profound weakness with inability to complete activities of daily living.  Back pain is well-controlled.  Psch:   Denies 
Internal Medicine Progress Note    [facility]    Jamil Stein D.O., DOREEN Robles D.O., DOREEN Fishman D.O.       Judie Zaragoza, MSN, APRN, NP-C  Elgin Dorantes, MSN, APRN-CNP  Chong Vargas, MSN, APRN-CNP     Primary Care Physician: Awadalla, Bahaa A, MD   Admitting Physician:  Ryan Robles DO  Admission date and time: 1/2/2024 11:26 AM    Room:  [address] [place] [address]  Admitting diagnosis: Pancreatic cancer (HCC) [C25.9]  Pancreatic mass [K86.89]  Sciatica of left side [M54.32]  Closed fracture of right ankle, initial encounter [S82.891A]  Acute pancreatitis, unspecified complication status, unspecified pancreatitis type [K85.90]    Patient Name: Tere Lewis  MRN: [id number]    Date of Service: 1/4/2024     Subjective:  Tere is a 73 y.o. female who was seen and examined today,1/4/2024, at the bedside.  Patient resting comfortably at the present time.  The patient is scheduled for endoscopy with ultrasound biopsy tomorrow.  MRI suggest more of a benign process.  Otherwise she is doing well tolerating her diet.  Her right leg is wrapped in a dressing.  She denies chest pain or shortness of breath    No family present during my examination.    Review of System:   Constitutional:   Denies fever or chills, weight loss or gain, fatigue or malaise.  HEENT:   Denies ear pain, sore throat, sinus or eye problems.  Cardiovascular:   Denies any chest pain, irregular heartbeats, or palpitations.   Respiratory:   Denies shortness of breath, coughing, sputum production, hemoptysis, or wheezing.  Gastrointestinal:   Denies nausea, vomiting, diarrhea, or constipation.  Denies any abdominal pain.  Genitourinary:    Denies any urgency, frequency, hematuria. Voiding  without difficulty.  Extremities:   Denies lower extremity swelling, edema or cyanosis.  Right leg dressing  Neurology:    Denies any headache or focal neurological deficits, 
Internal Medicine Progress Note    [facility]    Jamil Stein D.O., DOREEN Robles D.O., DOREEN Fishman D.O.       Judie Zaragoza, MSN, APRN, NP-C  Elgin Dorantes, MSN, APRN-CNP  Chong Vargas, MSN, APRN-CNP     Primary Care Physician: Awadalla, Bahaa A, MD   Admitting Physician:  Ryan Robles DO  Admission date and time: 1/2/2024 11:26 AM    Room:  ENDO POOL ROOM/NONE  Admitting diagnosis: Pancreatic cancer (HCC) [C25.9]  Pancreatic mass [K86.89]  Sciatica of left side [M54.32]  Closed fracture of right ankle, initial encounter [S82.891A]  Acute pancreatitis, unspecified complication status, unspecified pancreatitis type [K85.90]    Patient Name: Tere Lewis  MRN: [id number]    Date of Service: 1/5/2024     Subjective:  Tere is a 73 y.o. female who was seen and examined today,1/5/2024, at the bedside.  Tere is resting comfortably during my examination today with plans for EUS and biopsy.  She has no abdominal pain currently but remains profoundly weak and deconditioned.  She will require temporary skilled nursing facility placement upon discharge.    Review of System:   Constitutional:   Denies fever or chills, weight loss or gain, fatigue or malaise.  HEENT:   Denies ear pain, sore throat, sinus or eye problems.  Cardiovascular:   Denies any chest pain, irregular heartbeats, or palpitations.   Respiratory:   Denies shortness of breath, coughing, sputum production, hemoptysis, or wheezing.  Gastrointestinal:   Denies nausea, vomiting, diarrhea, or constipation.  Denies any abdominal pain.  Genitourinary:    Denies any urgency, frequency, hematuria. Voiding  without difficulty.  Extremities:   Denies lower extremity swelling, edema or cyanosis.  Right leg splint  Neurology:    Denies any headache or focal neurological deficits, profound weakness with inability to complete activities of daily living.  Back pain 
OCCUPATIONAL THERAPY INITIAL EVALUATION    [facility]  [address] [place]        Date:2024                                                  Patient Name: Tere Lewis    MRN: [id number]    : 1950    Room: [address] [place] [address]      Evaluating OT: Juana Dominguez OTR/L; [id number]     Referring Provider and Specific Provider Orders/Date:      24  OT eval and treat  Start:  24,   End:  24,   ONE TIME,   Standing Count:  1 Occurrences,   R         Ryan Robles, DO      Placement Recommendation: Subacute, not safe to return home alone as NWB: R LE       Diagnosis:   1. Pancreatic mass    2. Sciatica of left side    3. Closed fracture of right ankle, initial encounter    4. Acute pancreatitis, unspecified complication status, unspecified pancreatitis type         Surgery: none       Pertinent Medical History:       Past Medical History:   Diagnosis Date    Heart murmur     Hypertension     Lymphedema     Metabolic syndrome     Osteoarthritis     Osteopenia     Pinched nerve          Past Surgical History:   Procedure Laterality Date    ABDOMEN SURGERY      VEIN SURGERY        Precautions:  Fall Risk, NWB: R LE d/t closed right subacute bimalleolar ankle fracture, Left lower extremity sciatica, pure wick, Oncology following due to  incidental finding of 5.cm cystic pancreatic head mass with PD obstruction and dilation without biliary obstruction concerning for primary malignancy vs SCN.      Assessment of current deficits:     [x] Functional mobility  [x]ADLs  [x] Strength               []Cognition    [x] Functional transfers   [x] IADLs         [x] Safety Awareness   [x]Endurance    [] Fine Coordination              [x] Balance      [] Vision/perception   []Sensation     []Gross Motor Coordination  [x] ROM  [] Delirium                   [] Motor Control     OT PLAN OF CARE   OT POC based on physician orders, patient 
Physical Therapy  Physical Therapy    Room #:   0317/0317-01    Date: 2024       Patient Name: Tere Lewis  : 1950      MRN: [id number]     Patient unavailable for physical therapy treatment due to  RN (Suzan) stated her BP is low and no therapy today . Will attempt PT treatment tomorrow. Thank you.      Parker Carreon  [facility]  LIC # [id number]        
Physical Therapy  Physical Therapy    Room #:   ENDO POOL ROOM/NONE    Date: 2024       Patient Name: Tere Lewis  : 1950      MRN: [id number]     Patient unavailable for physical therapy treatment due to  patient is in surgery . Will attempt PT treatment when medically stable. Thank you.      Parker Carreon  [facility]  LIC # [id number]        
Physical Therapy Treatment Note/Plan of Care    Room #:  0317/0317-01  Patient Name: Tere Lewis  YOB: 1950  MRN: [id number]    Date of Service: 1/8/2024     Tentative placement recommendation: Subacute Rehab  Equipment recommendation: To be determined      Evaluating Physical Therapist: Jenniffer De Dios PT #[id number]      Specific Provider Orders/Date/Referring Provider :  01/02/24 2100    PT eval and treat  Start:  01/02/24 2100,   End:  01/02/24 2100,   ONE TIME,   Standing Count:  1 Occurrences,   R       Ryan Robles DO    Admitting Diagnosis:   Pancreatic cancer (HCC) [C25.9]  Pancreatic mass [K86.89]  Sciatica of left side [M54.32]  Closed fracture of right ankle, initial encounter [S82.891A]  Acute pancreatitis, unspecified complication status, unspecified pancreatitis type [K85.90]      Left hip, left low back, and right ankle pain.   Closed fracture of right ankle, initial encounter   Surgery: none  Visit Diagnoses         Codes    Acute pancreatitis, unspecified complication status, unspecified pancreatitis type     K85.90            Patient Active Problem List   Diagnosis    DJD (degenerative joint disease) of knee    Primary osteoarthritis of both knees    DDD (degenerative disc disease), lumbar    Lumbosacral radiculopathy at L4    Lumbar spinal stenosis    Tibial plateau fracture, left, closed, initial encounter    Pancreatic cancer (HCC)    Pancreatic mass    Sciatica of left side    Closed fracture of right ankle        ASSESSMENT of Current Deficits Patient exhibits decreased strength, balance, and endurance impairing functional mobility, transfers, gait , gait distance, and tolerance to activity. Attempted several times to get the patient to the edge of the bed, but each time she c/o increased pain with her left leg and could not do it. Pt able to perform all BUE's exercises with AROM and PROM/AAROM with all BLE's exercises. Patient requires continued skilled physical therapy to 
    []Gross Motor Coordination  [x] ROM  [] Delirium                   [] Motor Control     OT PLAN OF CARE   OT POC based on physician orders, patient diagnosis and results of clinical assessment    Frequency/Duration 1-3 days/wk for 2 weeks PRN     Specific OT Treatment Interventions to include:   * Instruction/training on adapted ADL techniques and AE recommendations to increase functional independence within precautions       * Training on energy conservation strategies, correct breathing pattern and techniques to improve independence/tolerance for self-care routine  * Functional transfer/mobility training/DME recommendations for increased independence, safety, and fall prevention  * Patient/Family education to increase follow through with safety techniques and functional independence  * Recommendation of environmental modifications for increased safety with functional transfers/mobility and ADLs  * Therapeutic exercise to improve motor endurance, ROM, and functional strength for ADLs/functional transfers  * Therapeutic activities to facilitate/challenge dynamic balance, stand tolerance for increased safety and independence with ADLs  * Positioning to improve skin integrity, interaction with environment and functional independence    Recommended Adaptive Equipment: TBD at rehab     Home Living: ALONE; single family home, 1 story, 4+1 steps to enter with rail, walk in shower, laundry in the basement.     Equipment owned: wheeled walker, straight cane, bedside commode, shower chair, grab bars, reachers, 1 crutch    Prior Level of Function: Independent with ADLs , Independent with IADLs    Driving: yes   Occupation: retired      Pain Level: 5/10 pain in R ankle; Nursing notified.      Cognition: A&O: 3/4; Follows 1 step directions   Memory: good    Sequencing: fair minus    Problem solving: fair minus   Judgement/safety: fair minus     [facility]   AM-PAC Daily Activity - Inpatient   How much help is needed for 
tolerance. Stood x 2 reps assist of 2 difficulty maintaining non weight bearing Right lower extremity, unable to advance Left lower extremity for gait. Unable to perform self seated scoot to Head of bed, max x 2 to scoot to Head of bed. Returned to bed, Dependent of  2 to scoot to Head of bed in trendelenburg       Therapeutic Exercises:  ankle pumps, quad sets, and long arc quad  x 20 reps.       At end of session, patient in bed with alarm call light and phone within reach,  all lines and tubes intact, nursing notified.      Patient would benefit from continued skilled Physical Therapy to improve functional independence and quality of life.         Patient's/ family goals   home    Time in  0834  Time out  0924    Total Treatment Time  30 minutes    Evaluation time includes thorough review of current medical information, gathering information on past medical history/social history and prior level of function, completion of standardized testing/informal observation of tasks, assessment of data, and development of Plan of care and goals.     CPT codes:  Low Complexity PT evaluation (08722)  Therapeutic activities (90136)   20 minutes  1 unit(s)  Therapeutic exercises (10355)   10 minutes  1 unit(s)    Jenniffer De Dios, PT     
pancreatic head mass.  Pancreatic triphasic CT scan showed a 5 cm mass in pancreatic head abutting SMV, second segment of duodenum.  Does not involve the celiac axis SMA.  MRCP shows findings consistent with benign serous cystadenoma.  CT chest negative for metastasis.  CA 19-9, chromogranin pending.  EUS with biopsy scheduled tomorrow    1/5/24  - Pancreatic triphasic CT scan showed a 5 cm mass in pancreatic head abutting SMV, second segment of duodenum. Does not involve the celiac axis SMA  - MRCP consistent with benign serous cystadenoma  - CT chest negative for metastasis  - CA 19-9 was 2, CEA 1.5,  was 4, CgA pending  - EUS with biopsy today  - Will follow pathology    Yahir Avila MD  Electronically signed 1/5/2024 at 11:41 AM

## 2024-01-08 NOTE — DISCHARGE INSTRUCTIONS
Your information:  Name: Tere Lewis  : 1950    Your instructions:    Discharge to snf.  Follow up with primary care provider and specialists as directed.  Follow up with Dr. Ovalle in one week to go over plan of care and biopsy results.   NWB to RLE.    What to do after you leave the hospital:    Recommended diet:  low fat diet    Recommended activity: NWB RLE    The following personal items were collected during your admission and were returned to you:    Belongings  Dental Appliances: None  Vision - Corrective Lenses: Eyeglasses  Hearing Aid: None  Clothing: Footwear, Pants, Socks, Undergarments, Shirt, At bedside  Jewelry: Earrings, Ring, At bedside  Electronic Devices: Cell Phone  Weapons (Notify Protective Services/Security): None  Home Medications: None  Valuables Given To: Patient  Provide Name(s) of Who Valuable(s) Were Given To: Tere    Information obtained by:  By signing below, I understand that if any problems occur once I leave the hospital I am to contact Dr. Awadalla.  I understand and acknowledge receipt of the instructions indicated above.

## 2024-01-08 NOTE — CARE COORDINATION
1/8/2024: SS NOTE:  Contacted [facility] confirming pt's transfer for today at 12 [facility], pt notified and will inform her family of her transfer arrangements, COLIN & HENS completed, Nursing informed.Electronically signed by PATY Singer on 1/8/2024 at 9:01 AM

## 2024-01-08 NOTE — PLAN OF CARE
Problem: Discharge Planning  Goal: Discharge to home or other facility with appropriate resources  1/7/2024 1940 by Wilman Smith RN  Outcome: Progressing  1/7/2024 1035 by Shonda Hooks RN  Outcome: Progressing     Problem: ABCDS Injury Assessment  Goal: Absence of physical injury  1/7/2024 1940 by Wilman Smith RN  Outcome: Progressing  1/7/2024 1035 by Shonda Hooks RN  Outcome: Progressing     Problem: Safety - Adult  Goal: Free from fall injury  1/7/2024 1940 by Wilman Smith RN  Outcome: Progressing  1/7/2024 1035 by Shonda Hooks RN  Outcome: Progressing     Problem: Pain  Goal: Verbalizes/displays adequate comfort level or baseline comfort level  1/7/2024 1940 by Wilman Smith RN  Outcome: Progressing  1/7/2024 1035 by Shonda Hooks RN  Outcome: Progressing     Problem: Nutrition Deficit:  Goal: Optimize nutritional status  1/7/2024 1940 by Wilman Smith RN  Outcome: Progressing  1/7/2024 1035 by Shonda Hooks RN  Outcome: Progressing     Problem: Skin/Tissue Integrity  Goal: Absence of new skin breakdown  Description: 1.  Monitor for areas of redness and/or skin breakdown  2.  Assess vascular access sites hourly  3.  Every 4-6 hours minimum:  Change oxygen saturation probe site  4.  Every 4-6 hours:  If on nasal continuous positive airway pressure, respiratory therapy assess nares and determine need for appliance change or resting period.  1/7/2024 1940 by Wilman Smith RN  Outcome: Progressing  1/7/2024 1035 by Shonda Hooks RN  Outcome: Progressing

## 2024-01-08 NOTE — DISCHARGE SUMMARY
Internal Medicine Progress Note     [facility]     Jamil Stein D.O., DOREEN Robles D.O., EB Rain, MSN, APRN, NP-C  Elgin Dorantes, MSN, APRN-CNP  Chong Vargas, MSN, APRN, NP-C       Internal Medicine  Discharge Summary    NAME: Tere Lewis  :  1950  MRN:  [id number]  PCP:Awadalla, Bahaa A, MD  ADMITTED: 2024      DISCHARGED: 24    ADMITTING PHYSICIAN: Ryan Robles DO    CONSULTANT(S):   IP CONSULT TO ORTHOPEDIC SURGERY  IP CONSULT TO PRIMARY CARE PROVIDER  IP CONSULT TO ONCOLOGY  IP CONSULT TO GI  IP CONSULT TO ONCOLOGY  IP CONSULT TO SOCIAL WORK  IP CONSULT TO SOCIAL WORK  IP CONSULT TO CASE MANAGEMENT     ADMITTING DIAGNOSIS:   Pancreatic cancer (HCC) [C25.9]  Pancreatic mass [K86.89]  Sciatica of left side [M54.32]  Closed fracture of right ankle, initial encounter [S82.891A]  Acute pancreatitis, unspecified complication status, unspecified pancreatitis type [K85.90]     DISCHARGE DIAGNOSES:   Pancreatic head mass with likelihood of serous cystadenoma status postbiopsy to rule out malignancy  Atraumatic subacute right bimalleolar fracture of the right ankle  Degenerative disc disease, facet arthropathy and osteoarthritis of the lumbar spine with associated sciatica  Essential hypertension  Morbid obesity with BMI 44.12 kg meter squared    BRIEF HISTORY OF PRESENT ILLNESS:   Tere Lewis is a 73-year-old female patient presented to [facility] for evaluation of ankle pain.  She denies any known injury or trauma to the area.  ER workup yielded a closed right subacute bimalleolar fracture.  Also had some complaints of pain in the back with radiation in the left lower extremity and was diagnosed with left lower extremity sciatica related to degenerative disc disease and arthritis of the lumbar spine.  During the patient's workup it was incidentally noted there was a pancreatic mass.

## 2024-01-09 ENCOUNTER — TELEPHONE (OUTPATIENT)
Dept: HEMATOLOGY | Age: 74
End: 2024-01-09

## 2024-01-11 LAB
NON-GYN CYTOLOGY REPORT: NORMAL
SURGICAL PATHOLOGY REPORT: NORMAL

## 2024-01-12 ENCOUNTER — HOSPITAL ENCOUNTER (EMERGENCY)
Age: 74
Discharge: NURSING, REHAB OR LONG-TERM CARE | End: 2024-01-13
Attending: EMERGENCY MEDICINE
Payer: MEDICARE

## 2024-01-12 VITALS
BODY MASS INDEX: 42.48 KG/M2 | SYSTOLIC BLOOD PRESSURE: 120 MMHG | DIASTOLIC BLOOD PRESSURE: 57 MMHG | OXYGEN SATURATION: 94 % | TEMPERATURE: 98.1 F | RESPIRATION RATE: 20 BRPM | HEART RATE: 91 BPM | WEIGHT: 225 LBS | HEIGHT: 61 IN

## 2024-01-12 DIAGNOSIS — K85.80 OTHER ACUTE PANCREATITIS WITHOUT INFECTION OR NECROSIS: Primary | ICD-10-CM

## 2024-01-12 DIAGNOSIS — K86.89 PANCREATIC MASS: ICD-10-CM

## 2024-01-12 PROCEDURE — 99285 EMERGENCY DEPT VISIT HI MDM: CPT

## 2024-01-12 ASSESSMENT — PAIN SCALES - GENERAL: PAINLEVEL_OUTOF10: 4

## 2024-01-12 ASSESSMENT — PAIN - FUNCTIONAL ASSESSMENT: PAIN_FUNCTIONAL_ASSESSMENT: 0-10

## 2024-01-13 ENCOUNTER — APPOINTMENT (OUTPATIENT)
Dept: CT IMAGING | Age: 74
End: 2024-01-13
Payer: MEDICARE

## 2024-01-13 LAB
ALBUMIN SERPL-MCNC: 2.7 G/DL (ref 3.5–5.2)
ALP SERPL-CCNC: 166 U/L (ref 35–104)
ALT SERPL-CCNC: 41 U/L (ref 0–32)
ANION GAP SERPL CALCULATED.3IONS-SCNC: 15 MMOL/L (ref 7–16)
AST SERPL-CCNC: 27 U/L (ref 0–31)
BASOPHILS # BLD: 0.02 K/UL (ref 0–0.2)
BASOPHILS NFR BLD: 0 % (ref 0–2)
BILIRUB DIRECT SERPL-MCNC: <0.2 MG/DL (ref 0–0.3)
BILIRUB INDIRECT SERPL-MCNC: ABNORMAL MG/DL (ref 0–1)
BILIRUB SERPL-MCNC: 0.5 MG/DL (ref 0–1.2)
BUN SERPL-MCNC: 15 MG/DL (ref 6–23)
CALCIUM SERPL-MCNC: 8.3 MG/DL (ref 8.6–10.2)
CHLORIDE SERPL-SCNC: 104 MMOL/L (ref 98–107)
CO2 SERPL-SCNC: 19 MMOL/L (ref 22–29)
CREAT SERPL-MCNC: 0.6 MG/DL (ref 0.5–1)
EKG ATRIAL RATE: 81 BPM
EKG P AXIS: 72 DEGREES
EKG P-R INTERVAL: 162 MS
EKG Q-T INTERVAL: 354 MS
EKG QRS DURATION: 82 MS
EKG QTC CALCULATION (BAZETT): 411 MS
EKG R AXIS: 31 DEGREES
EKG T AXIS: 31 DEGREES
EKG VENTRICULAR RATE: 81 BPM
EOSINOPHIL # BLD: 0.39 K/UL (ref 0.05–0.5)
EOSINOPHILS RELATIVE PERCENT: 3 % (ref 0–6)
ERYTHROCYTE [DISTWIDTH] IN BLOOD BY AUTOMATED COUNT: 13.2 % (ref 11.5–15)
GFR SERPL CREATININE-BSD FRML MDRD: >60 ML/MIN/1.73M2
GLUCOSE SERPL-MCNC: 118 MG/DL (ref 74–99)
HCT VFR BLD AUTO: 27.6 % (ref 34–48)
HGB BLD-MCNC: 9.1 G/DL (ref 11.5–15.5)
IMM GRANULOCYTES # BLD AUTO: 0.19 K/UL (ref 0–0.58)
IMM GRANULOCYTES NFR BLD: 2 % (ref 0–5)
LACTATE BLDV-SCNC: 1 MMOL/L (ref 0.5–2.2)
LIPASE SERPL-CCNC: 1500 U/L (ref 13–60)
LYMPHOCYTES NFR BLD: 1.74 K/UL (ref 1.5–4)
LYMPHOCYTES RELATIVE PERCENT: 14 % (ref 20–42)
MCH RBC QN AUTO: 31.2 PG (ref 26–35)
MCHC RBC AUTO-ENTMCNC: 33 G/DL (ref 32–34.5)
MCV RBC AUTO: 94.5 FL (ref 80–99.9)
MONOCYTES NFR BLD: 0.71 K/UL (ref 0.1–0.95)
MONOCYTES NFR BLD: 6 % (ref 2–12)
NEUTROPHILS NFR BLD: 75 % (ref 43–80)
NEUTS SEG NFR BLD: 9.06 K/UL (ref 1.8–7.3)
PLATELET # BLD AUTO: 365 K/UL (ref 130–450)
PMV BLD AUTO: 9.2 FL (ref 7–12)
POTASSIUM SERPL-SCNC: 3.9 MMOL/L (ref 3.5–5)
PROT SERPL-MCNC: 6.2 G/DL (ref 6.4–8.3)
RBC # BLD AUTO: 2.92 M/UL (ref 3.5–5.5)
SODIUM SERPL-SCNC: 138 MMOL/L (ref 132–146)
TROPONIN I SERPL HS-MCNC: 23 NG/L (ref 0–9)
TROPONIN I SERPL HS-MCNC: 23 NG/L (ref 0–9)
WBC OTHER # BLD: 12.1 K/UL (ref 4.5–11.5)

## 2024-01-13 PROCEDURE — 93005 ELECTROCARDIOGRAM TRACING: CPT | Performed by: STUDENT IN AN ORGANIZED HEALTH CARE EDUCATION/TRAINING PROGRAM

## 2024-01-13 PROCEDURE — 6370000000 HC RX 637 (ALT 250 FOR IP): Performed by: EMERGENCY MEDICINE

## 2024-01-13 PROCEDURE — 84484 ASSAY OF TROPONIN QUANT: CPT

## 2024-01-13 PROCEDURE — 82248 BILIRUBIN DIRECT: CPT

## 2024-01-13 PROCEDURE — 74177 CT ABD & PELVIS W/CONTRAST: CPT

## 2024-01-13 PROCEDURE — 80053 COMPREHEN METABOLIC PANEL: CPT

## 2024-01-13 PROCEDURE — 6360000004 HC RX CONTRAST MEDICATION: Performed by: RADIOLOGY

## 2024-01-13 PROCEDURE — 83605 ASSAY OF LACTIC ACID: CPT

## 2024-01-13 PROCEDURE — 85025 COMPLETE CBC W/AUTO DIFF WBC: CPT

## 2024-01-13 PROCEDURE — 36415 COLL VENOUS BLD VENIPUNCTURE: CPT

## 2024-01-13 PROCEDURE — 83690 ASSAY OF LIPASE: CPT

## 2024-01-13 RX ORDER — OXYCODONE HYDROCHLORIDE AND ACETAMINOPHEN 5; 325 MG/1; MG/1
1 TABLET ORAL ONCE
Status: COMPLETED | OUTPATIENT
Start: 2024-01-13 | End: 2024-01-13

## 2024-01-13 RX ADMIN — IOPAMIDOL 75 ML: 755 INJECTION, SOLUTION INTRAVENOUS at 03:46

## 2024-01-13 RX ADMIN — OXYCODONE AND ACETAMINOPHEN 1 TABLET: 5; 325 TABLET ORAL at 08:05

## 2024-01-13 ASSESSMENT — PAIN SCALES - GENERAL: PAINLEVEL_OUTOF10: 6

## 2024-01-13 NOTE — ED NOTES
Explained to pt that a urine sample was needed. Pt responded that she wears a brief. This RN explained that we would need to empty her bladder using a straight cath if she was unable to provide urine sample to rule out UTI. Pt stated that she did not want that to happen.

## 2024-01-13 NOTE — ED PROVIDER NOTES
Name: Tere Lewis    MRN: [id number]     Date / Time Roomed:  1/12/2024 11:38 PM  ED Bed Assignment:  [id number]    ------------------ History of Present Illness --------------------  1/12/24, Time: 11:48 PM EST   Chief Complaint   Patient presents with    Abdominal Pain     Patient had pancreatic mass removed about a week ago. Patient initiated back on regular diet. Denies N/V. Having stabbing pains.      HPI    Tere Lewis is a 73 y.o. female, with hx of hypertension, heart murmur, osteopenia, osteoarthritis, lymphedema, recent right ankle fracture approximately 2 weeks ago, recent pancreatic surgery just 1 week ago for a mass at the head of the pancreas, who presents to the ED today for abdominal pain which she states started this afternoon.  She states she had an EGD performed about a week ago and had a biopsy of the pancreatic mass.  She states she was recently discharged to rehab facility several days ago and started rehab.  She has been back on her normal diet for few days now.  She relates doing some exercises today a rehab and shortly afterwards started having some right-sided abdominal discomfort.  She states pain is worse with movement and rolling around.  Patient relates some mild abdominal distention and feeling of being a bit bloated.  Last bowel movement was this morning was normal.  Patient denies any nausea vomiting or fevers.  Relates her abdominal pain is currently 4 out of 10 and tolerable and does not want anything for pain at this time.  Denies any urinary complaints.  The pt denies other ROS at this time.     PCP: Awadalla, Bahaa A, MD.    -------------------- PMH --------------------    Past Medical History:   has a past medical history of Heart murmur, Hypertension, Lymphedema, Metabolic syndrome, Osteoarthritis, Osteopenia, and Pinched nerve.     Surgical History:  Past Surgical History:   Procedure Laterality Date    ABDOMEN SURGERY  1984    UPPER GASTROINTESTINAL ENDOSCOPY N/A  the morning and at bedtimeHistorical Med      Garlic 1500 MG CAPS Take 3,000 mg by mouth in the morning and at bedtimeHistorical Med      cod liver oil CAPS Take 2 capsules by mouth in the morning and at bedtimeHistorical Med      Multiple Vitamins-Minerals (CENTRUM PO) Take 1 tablet by mouth dailyHistorical Med             The patient’s home medications have been reviewed.    -------------------- PE --------------------  Physical Exam  Constitutional:       General: She is not in acute distress.     Appearance: Normal appearance. She is obese. She is ill-appearing (chronic). She is not toxic-appearing or diaphoretic.   HENT:      Head: Normocephalic and atraumatic.      Right Ear: External ear normal.      Left Ear: External ear normal.      Nose: Nose normal. No rhinorrhea.      Mouth/Throat:      Mouth: Mucous membranes are moist.      Pharynx: Oropharynx is clear.   Eyes:      General: No scleral icterus.        Right eye: No discharge.         Left eye: No discharge.      Extraocular Movements: Extraocular movements intact.      Conjunctiva/sclera: Conjunctivae normal.      Pupils: Pupils are equal, round, and reactive to light.   Cardiovascular:      Rate and Rhythm: Normal rate and regular rhythm.      Pulses: Normal pulses.   Pulmonary:      Effort: Pulmonary effort is normal. No respiratory distress.      Breath sounds: Normal breath sounds. No stridor.   Abdominal:      General: Abdomen is flat and protuberant. There is no distension.      Palpations: Abdomen is soft.      Tenderness: There is abdominal tenderness in the right upper quadrant, right lower quadrant and epigastric area. There is no guarding. Positive signs include Lynch's sign.   Musculoskeletal:         General: No swelling or tenderness. Normal range of motion.      Cervical back: Normal range of motion.      Right lower leg: No edema.      Left lower leg: No edema.   Skin:     General: Skin is warm and dry.      Capillary Refill:

## 2024-01-15 LAB
EKG ATRIAL RATE: 81 BPM
EKG P AXIS: 72 DEGREES
EKG P-R INTERVAL: 162 MS
EKG Q-T INTERVAL: 354 MS
EKG QRS DURATION: 82 MS
EKG QTC CALCULATION (BAZETT): 411 MS
EKG R AXIS: 31 DEGREES
EKG T AXIS: 31 DEGREES
EKG VENTRICULAR RATE: 81 BPM

## 2024-01-15 PROCEDURE — 93010 ELECTROCARDIOGRAM REPORT: CPT | Performed by: INTERNAL MEDICINE

## 2024-01-17 ENCOUNTER — HOSPITAL ENCOUNTER (OUTPATIENT)
Dept: INFUSION THERAPY | Age: 74
Discharge: HOME | End: 2024-01-17

## 2024-01-17 ENCOUNTER — OFFICE VISIT (OUTPATIENT)
Dept: ONCOLOGY | Age: 74
End: 2024-01-17
Payer: MEDICARE

## 2024-01-17 ENCOUNTER — TELEPHONE (OUTPATIENT)
Dept: CASE MANAGEMENT | Age: 74
End: 2024-01-17

## 2024-01-17 VITALS
DIASTOLIC BLOOD PRESSURE: 63 MMHG | HEART RATE: 85 BPM | BODY MASS INDEX: 42.51 KG/M2 | SYSTOLIC BLOOD PRESSURE: 131 MMHG | TEMPERATURE: 96.9 F | OXYGEN SATURATION: 93 % | HEIGHT: 61 IN

## 2024-01-17 DIAGNOSIS — K86.89 PANCREATIC MASS: Primary | ICD-10-CM

## 2024-01-17 PROCEDURE — 99214 OFFICE O/P EST MOD 30 MIN: CPT

## 2024-01-17 RX ORDER — ACETAMINOPHEN 325 MG/1
650 TABLET ORAL EVERY 4 HOURS PRN
COMMUNITY

## 2024-01-17 RX ORDER — ASCORBIC ACID 500 MG
500 TABLET ORAL DAILY
COMMUNITY

## 2024-01-17 RX ORDER — BISACODYL 10 MG
10 SUPPOSITORY, RECTAL RECTAL DAILY PRN
COMMUNITY

## 2024-01-17 RX ORDER — ALUMINA, MAGNESIA, AND SIMETHICONE 2400; 2400; 240 MG/30ML; MG/30ML; MG/30ML
15 SUSPENSION ORAL EVERY 6 HOURS PRN
COMMUNITY

## 2024-01-17 NOTE — PROGRESS NOTES
Tere Lewis  1950 73 y.o.      Referring Physician:     PCP: Awadalla, Bahaa A, MD    Vitals:    24 1353   BP: 131/63   Pulse: 85   Temp: 96.9 °F (36.1 °C)   SpO2: 93%        Wt Readings from Last 3 Encounters:   24 102.1 kg (225 lb)   24 109.6 kg (241 lb 11.2 oz)   23 102.1 kg (225 lb)        Body mass index is 42.51 kg/m².          Chief Complaint:   Chief Complaint   Patient presents with    Cancer    New Patient     Pancreatic cancer          Cancer Staging   No matching staging information was found for the patient.    Prior Radiation Therapy? NO    Concurrent Chemo/radiation? NO    Prior Chemotherapy? NO    Prior Hormonal Therapy? NO    Head and Neck Cancer? No, patient does NOT have HN cancer.      LMP: 1996    Age at first Menses: 11 yo    : 0    Para: 0          Current Outpatient Medications:     vitamin C (ASCORBIC ACID) 500 MG tablet, Take 1 tablet by mouth daily, Disp: , Rfl:     pantoprazole (PROTONIX) 40 MG tablet, Take 1 tablet by mouth every morning (before breakfast), Disp: 30 tablet, Rfl: 0    lisinopril (PRINIVIL;ZESTRIL) 20 MG tablet, TAKE 1 TABLET BY MOUTH TWICE DAILY, Disp: 180 tablet, Rfl: 1    aspirin EC 81 MG EC tablet, Take 1 tablet by mouth in the morning and at bedtime, Disp: 30 tablet, Rfl: 0    b complex vitamins capsule, Take 1 capsule by mouth Daily with supper, Disp: , Rfl:     vitamin B-12 (CYANOCOBALAMIN) 1000 MCG tablet, Take 1 tablet by mouth daily, Disp: , Rfl:     calcium carbonate 1500 (600 Ca) MG TABS tablet, Take 1 tablet by mouth daily, Disp: , Rfl:     Omega-3 Fatty Acids (FISH OIL) 1000 MG capsule, Take 2 capsules by mouth 2 times daily, Disp: , Rfl:     Cholecalciferol (VITAMIN D) 50 MCG (2000 UT) CAPS capsule, Take 2,000 Units by mouth in the morning and at bedtime, Disp: , Rfl:     Garlic 1500 MG CAPS, Take 3,000 mg by mouth in the morning and at bedtime, Disp: , Rfl:     cod liver oil CAPS, Take 2 capsules by mouth in the 
There are fractures that can be recent or subacute in the medial malleolus and in the distal right fibular shaft proximal to the tibiofibular synchondrosis.  The medial malleolar fracture is displaced.  The distal right fibular fracture is minimal displaced. 2.  Prominent subcutaneous/adipose soft tissue in the distal right lower extremity to be correlate with the clinical data.  See above comments.     CT LUMBAR SPINE WO CONTRAST    Result Date: 1/2/2024  EXAMINATION: CT OF THE LUMBAR SPINE WITHOUT CONTRAST  1/2/2024 TECHNIQUE: CT of the lumbar spine was performed without the administration of intravenous contrast. Multiplanar reformatted images are provided for review. Adjustment of mA and/or kV according to patient size was utilized.  Automated exposure control, iterative reconstruction, and/or weight based adjustment of the mA/kV was utilized to reduce the radiation dose to as low as reasonably achievable. COMPARISON: Plain radiographs lumbar spine 10/18/2016. HISTORY: ORDERING SYSTEM PROVIDED HISTORY: low back pain w/ LLE radiculopathy TECHNOLOGIST PROVIDED HISTORY: Reason for exam:->low back pain w/ LLE radiculopathy Decision Support Exception - unselect if not a suspected or confirmed emergency medical condition->Emergency Medical Condition (MA) FINDINGS: Osteopenia is noted.  There is mild anterior malalignment of L4 on L5 and of L5 on S1.  Disc space narrowing and discovertebral degenerative changes are identified.  Facet arthritis is also seen.  There is neural foraminal narrowing at L4-5 on the left.  No vertebral fracture is identified.  No spondylolysis is seen on the right or left.  The sacroiliac joints are intact.  The paraspinal soft tissues appear unremarkable.  There is a 6.2 x 4.2 x 5.4 cm mass suspicious for malignancy arising from the head of the pancreas.  Pancreatic ductal dilatation is suspected.  There is a 1.5 cm low-density right adrenal nodule.  This finding could represent a benign

## 2024-01-17 NOTE — TELEPHONE ENCOUNTER
Met with patient during the initial inpatient consult with Dr. Noland for concern with pancreatic imaging. Introduced nurse navigator role and informed of other supportive services in clinic: , financial navigator and nutrition. Patient is currently in rehab for a right foot fracture and doesn't know when she'll be discharged. She denied issues with living arrangements, transportation, insurance and prescription coverage. She reported current appetite as good, denying unplanned weight loss. Dr. Noland informed patient she does not have pancreatic cancer and is being discharged from oncology. She is to continue following with Dr. Lindsay for surveillance and imaging. Her next appointment with her is on 01/31/2024. Provided patient Dr. Noland's clinic contact information if she has future needs, she verbalized understanding.Steffi Brown RN, ADN, BSE, OCN Patient Nurse Navigator

## 2024-01-24 DIAGNOSIS — M25.571 RIGHT ANKLE PAIN, UNSPECIFIED CHRONICITY: Primary | ICD-10-CM

## 2024-01-29 ENCOUNTER — OFFICE VISIT (OUTPATIENT)
Dept: ORTHOPEDIC SURGERY | Age: 74
End: 2024-01-29

## 2024-01-29 VITALS — BODY MASS INDEX: 42.48 KG/M2 | HEIGHT: 61 IN | WEIGHT: 225 LBS | TEMPERATURE: 98 F

## 2024-01-29 DIAGNOSIS — S82.841A CLOSED BIMALLEOLAR FRACTURE OF RIGHT ANKLE, INITIAL ENCOUNTER: Primary | ICD-10-CM

## 2024-01-29 NOTE — PROGRESS NOTES
Tere Lewis is a 73 y.o. year old female who presents today for evaluation of a right ankle injury which occurred on 1/2/24.  The patient reports she has no idea when she injured her [id number] patient denies any other injuries.  Movement makes the pain worse, the splint and resting makes the pain better.      The patient's past medical history, medications, and review of systems was reviewed.       On Physical Exam, Tere Lewis is well-developed, well-nourished, and oriented to person, place and time.  her gait is intact.  On evaluation of her right lower extremity, there is obvious deformity.  There is swelling and is ecchymosis.  she is tender to palpation over the medial malleolus, and otherwise nontender over the remainder of the extremity.  Range of motion is decreased secondary to pain over the right ankle.  The skin overlying the right lower extremity is intact without evidence of lesion, laceration or abrasion.  Distal pulses are 2+ and symmetric bilaterally.  Sensation is grossly intact to light touch and symmetric bilaterally.    Xrays:    Healing fracture of the distal fibula and medial malleolus.  Ankle mortise is  intact.  Overlying cast obscures image detail.  No change in the minimal  calcaneal spurring.    Impression:    Encounter Diagnosis   Name Primary?    Displaced fracture of medial malleolus of left tibia, initial encounter for closed fracture Yes         Plan:   The splint is holding the fracture well.  We will maintain splint at this time and she will continue with NWB.  She will follow up with my PA in 3 weeks for an xray and re evaluation.

## 2024-01-31 ENCOUNTER — OFFICE VISIT (OUTPATIENT)
Dept: HEMATOLOGY | Age: 74
End: 2024-01-31
Payer: MEDICARE

## 2024-01-31 VITALS
OXYGEN SATURATION: 96 % | SYSTOLIC BLOOD PRESSURE: 157 MMHG | BODY MASS INDEX: 42.51 KG/M2 | HEART RATE: 88 BPM | HEIGHT: 61 IN | RESPIRATION RATE: 15 BRPM | DIASTOLIC BLOOD PRESSURE: 70 MMHG | TEMPERATURE: 97.6 F

## 2024-01-31 DIAGNOSIS — K86.2 PANCREATIC CYST: ICD-10-CM

## 2024-01-31 DIAGNOSIS — K86.89 PANCREATIC MASS: Primary | ICD-10-CM

## 2024-01-31 PROCEDURE — 99214 OFFICE O/P EST MOD 30 MIN: CPT | Performed by: STUDENT IN AN ORGANIZED HEALTH CARE EDUCATION/TRAINING PROGRAM

## 2024-01-31 PROCEDURE — G8427 DOCREV CUR MEDS BY ELIG CLIN: HCPCS | Performed by: STUDENT IN AN ORGANIZED HEALTH CARE EDUCATION/TRAINING PROGRAM

## 2024-01-31 PROCEDURE — 1036F TOBACCO NON-USER: CPT | Performed by: STUDENT IN AN ORGANIZED HEALTH CARE EDUCATION/TRAINING PROGRAM

## 2024-01-31 PROCEDURE — 3017F COLORECTAL CA SCREEN DOC REV: CPT | Performed by: STUDENT IN AN ORGANIZED HEALTH CARE EDUCATION/TRAINING PROGRAM

## 2024-01-31 PROCEDURE — G8484 FLU IMMUNIZE NO ADMIN: HCPCS | Performed by: STUDENT IN AN ORGANIZED HEALTH CARE EDUCATION/TRAINING PROGRAM

## 2024-01-31 PROCEDURE — 1123F ACP DISCUSS/DSCN MKR DOCD: CPT | Performed by: STUDENT IN AN ORGANIZED HEALTH CARE EDUCATION/TRAINING PROGRAM

## 2024-01-31 PROCEDURE — G8417 CALC BMI ABV UP PARAM F/U: HCPCS | Performed by: STUDENT IN AN ORGANIZED HEALTH CARE EDUCATION/TRAINING PROGRAM

## 2024-01-31 PROCEDURE — 1111F DSCHRG MED/CURRENT MED MERGE: CPT | Performed by: STUDENT IN AN ORGANIZED HEALTH CARE EDUCATION/TRAINING PROGRAM

## 2024-01-31 PROCEDURE — G8400 PT W/DXA NO RESULTS DOC: HCPCS | Performed by: STUDENT IN AN ORGANIZED HEALTH CARE EDUCATION/TRAINING PROGRAM

## 2024-01-31 PROCEDURE — 99212 OFFICE O/P EST SF 10 MIN: CPT | Performed by: STUDENT IN AN ORGANIZED HEALTH CARE EDUCATION/TRAINING PROGRAM

## 2024-01-31 PROCEDURE — 1090F PRES/ABSN URINE INCON ASSESS: CPT | Performed by: STUDENT IN AN ORGANIZED HEALTH CARE EDUCATION/TRAINING PROGRAM

## 2024-01-31 RX ORDER — DIPHENHYDRAMINE HCL 25 MG
25 CAPSULE ORAL EVERY 6 HOURS PRN
COMMUNITY

## 2024-01-31 NOTE — PROGRESS NOTES
5cm serous cystic neoplasm  - We discussed that these lesions are generally benign and do not need surgical intervention unless they get large enough to cause symptoms  - her Ca 19-9 is normal and EUS very typical for SCN.   - Will follow her in 1 year with an MRI.            Thank you for the consultation allowing me to take part in Ms. Lewis's care.   45 Minutes of which greater than 50% was spent counseling or coordinating her care.      Tessa Lindsay MD

## 2024-02-01 ASSESSMENT — ENCOUNTER SYMPTOMS
ALLERGIC/IMMUNOLOGIC NEGATIVE: 1
RESPIRATORY NEGATIVE: 1
GASTROINTESTINAL NEGATIVE: 1
EYES NEGATIVE: 1

## 2024-02-13 DIAGNOSIS — S82.841A CLOSED BIMALLEOLAR FRACTURE OF RIGHT ANKLE, INITIAL ENCOUNTER: Primary | ICD-10-CM

## 2024-02-19 ENCOUNTER — OFFICE VISIT (OUTPATIENT)
Dept: ORTHOPEDIC SURGERY | Age: 74
End: 2024-02-19

## 2024-02-19 VITALS — TEMPERATURE: 98 F | WEIGHT: 225 LBS | HEIGHT: 61 IN | BODY MASS INDEX: 42.48 KG/M2

## 2024-02-19 DIAGNOSIS — S82.841A CLOSED BIMALLEOLAR FRACTURE OF RIGHT ANKLE, INITIAL ENCOUNTER: Primary | ICD-10-CM

## 2024-02-19 PROCEDURE — 99024 POSTOP FOLLOW-UP VISIT: CPT | Performed by: ORTHOPAEDIC SURGERY

## 2024-02-21 NOTE — PROGRESS NOTES
Tere Lewis is a 73 y.o. year old female who presents today for evaluation of a right ankle injury which occurred on 1/2/24.  She is doing well today and is anxious to begin weight bearing    The patient's past medical history, medications, and review of systems was reviewed.       On Physical Exam, Tere Lewis is well-developed, well-nourished, and oriented to person, place and time.  her gait is intact.  On evaluation of her right lower extremity, there is obvious deformity.  There is swelling  but no ecchymosis.  she is tender to palpation over the medial malleolus, and otherwise  nontender over the remainder of the extremity.  Range of motion is decreased secondary to pain over the right ankle.  The skin overlying the right lower extremity is intact without evidence of lesion, laceration or abrasion.  Distal pulses are 2+ and symmetric bilaterally.  Sensation is grossly intact to light touch and symmetric bilaterally.    Xrays:    Reviewed with the patient, see report in epic.    Impression:    Encounter Diagnosis   Name Primary?    Closed bimalleolar fracture of right ankle, initial encounter Yes           Plan:   She can begin WBAT in her own shoe.  She will continue with physical therapy.  I will see her back in a month.

## 2024-02-28 ENCOUNTER — OFFICE VISIT (OUTPATIENT)
Dept: ORTHOPEDIC SURGERY | Age: 74
End: 2024-02-28
Payer: MEDICARE

## 2024-02-28 VITALS — HEIGHT: 61 IN | BODY MASS INDEX: 42.48 KG/M2 | TEMPERATURE: 98 F | WEIGHT: 225 LBS

## 2024-02-28 DIAGNOSIS — M17.11 PRIMARY OSTEOARTHRITIS OF RIGHT KNEE: Primary | ICD-10-CM

## 2024-02-28 DIAGNOSIS — M17.12 PRIMARY OSTEOARTHRITIS OF LEFT KNEE: ICD-10-CM

## 2024-02-28 PROCEDURE — 20610 DRAIN/INJ JOINT/BURSA W/O US: CPT

## 2024-02-28 RX ORDER — TRIAMCINOLONE ACETONIDE 40 MG/ML
40 INJECTION, SUSPENSION INTRA-ARTICULAR; INTRAMUSCULAR ONCE
Status: COMPLETED | OUTPATIENT
Start: 2024-02-28 | End: 2024-02-28

## 2024-02-28 RX ADMIN — TRIAMCINOLONE ACETONIDE 40 MG: 40 INJECTION, SUSPENSION INTRA-ARTICULAR; INTRAMUSCULAR at 11:54

## 2024-02-28 NOTE — PROGRESS NOTES
Chief Complaint   Patient presents with    Knee Pain     B/l knee 3 month f/u         I will proceed with a cortisone injection in the Bilateral knee.  Verbal and written consent was obtained for the injections. The skin was prepped with alcohol. 1mL of Kenalog 40mg and 9mL of 0.25% Marcaine was  injected to Bilateral knee. The injection was given through the lateral side of the knee. The patient tolerated the injection well. I will see the patient back 1 month      Tere was seen today for knee pain.    Diagnoses and all orders for this visit:    Primary osteoarthritis of right knee    Primary osteoarthritis of left knee

## 2024-03-12 DIAGNOSIS — S82.841A CLOSED BIMALLEOLAR FRACTURE OF RIGHT ANKLE, INITIAL ENCOUNTER: Primary | ICD-10-CM

## 2024-03-13 NOTE — ED NOTES
Anesthesia Post-op Note      Patient: Sheela Mclain  Procedure: EGD (ESOPHAGOGASTRODUODENOSCOPY) (Esophagus)   Anesthesia type: MAC       Vital Last Value   Temperature 36.7 °C (98 °F) (03/13/24 1320)   Pulse 71 (03/13/24 1335)   Respiratory 18 (03/13/24 1335)   Non-Invasive  Blood Pressure 108/65 (03/13/24 1335)   Arterial   Blood Pressure     Pulse Oximetry 98 % (03/13/24 1335)     Mental status recovered. Patient participates in evaluation.  VS noted and are stable.  Respiratory function satisfactory; airway patent.  Cardiovascular function and hydration status satisfactory.  Adequate pain control.  Nausea and vomiting control satisfactory.    No apparent anesthetic complications.    Comments:  The patient has been evaluated and is ready for discharge.   Report called to 3rd floor RN

## 2024-03-19 ENCOUNTER — OFFICE VISIT (OUTPATIENT)
Dept: ORTHOPEDIC SURGERY | Age: 74
End: 2024-03-19
Payer: MEDICARE

## 2024-03-19 VITALS — WEIGHT: 225 LBS | BODY MASS INDEX: 42.48 KG/M2 | HEIGHT: 61 IN | TEMPERATURE: 98 F

## 2024-03-19 DIAGNOSIS — S82.841A CLOSED BIMALLEOLAR FRACTURE OF RIGHT ANKLE, INITIAL ENCOUNTER: Primary | ICD-10-CM

## 2024-03-19 PROCEDURE — G8427 DOCREV CUR MEDS BY ELIG CLIN: HCPCS | Performed by: ORTHOPAEDIC SURGERY

## 2024-03-19 PROCEDURE — 99213 OFFICE O/P EST LOW 20 MIN: CPT | Performed by: ORTHOPAEDIC SURGERY

## 2024-03-19 PROCEDURE — 1036F TOBACCO NON-USER: CPT | Performed by: ORTHOPAEDIC SURGERY

## 2024-03-19 PROCEDURE — G8417 CALC BMI ABV UP PARAM F/U: HCPCS | Performed by: ORTHOPAEDIC SURGERY

## 2024-03-19 PROCEDURE — G8400 PT W/DXA NO RESULTS DOC: HCPCS | Performed by: ORTHOPAEDIC SURGERY

## 2024-03-19 PROCEDURE — 1123F ACP DISCUSS/DSCN MKR DOCD: CPT | Performed by: ORTHOPAEDIC SURGERY

## 2024-03-19 PROCEDURE — G8484 FLU IMMUNIZE NO ADMIN: HCPCS | Performed by: ORTHOPAEDIC SURGERY

## 2024-03-19 PROCEDURE — 1090F PRES/ABSN URINE INCON ASSESS: CPT | Performed by: ORTHOPAEDIC SURGERY

## 2024-03-19 NOTE — PATIENT INSTRUCTIONS
Right ankle fracture is healed.  She is WBAT on bilateral lower extremities.  Ok to discharge home from [facility].

## 2024-03-19 NOTE — PROGRESS NOTES
Chief Complaint   Patient presents with    Ankle Pain     Right Ankle FX F/U 01/02/2024       Tere Lewis returns today for follow-up of her right ankle fx 1/2/24.  Patient is doing well and is FWB  Past Medical History:   Diagnosis Date    Chronic back pain     Degeneration of lumbar intervertebral disc     Heart murmur     Hypertension     Lymphedema     bilateral legs    Metabolic syndrome     Osteoarthritis     Osteopenia     Pinched nerve     Radiculopathy of lumbar region     Sciatica of left side      Past Surgical History:   Procedure Laterality Date    ABDOMEN SURGERY  1984    UPPER GASTROINTESTINAL ENDOSCOPY N/A 01/05/2024    EGD W/EUS FNA performed by Delano Perez MD at [facility] ENDOSCOPY    VEIN SURGERY Bilateral 2016       Current Outpatient Medications:     diphenhydrAMINE (BENADRYL) 25 MG capsule, Take 1 capsule by mouth every 6 hours as needed for Itching, Disp: , Rfl:     bisacodyl (DULCOLAX) 10 MG suppository, Place 1 suppository rectally daily as needed for Constipation, Disp: , Rfl:     magnesium hydroxide (MILK OF MAGNESIA) 400 MG/5ML suspension, Take 30 mLs by mouth daily as needed for Constipation, Disp: , Rfl:     aluminum & magnesium hydroxide-simethicone (MYLANTA) 400-400-40 MG/5ML SUSP, Take 15 mLs by mouth every 6 hours as needed, Disp: , Rfl:     acetaminophen (TYLENOL) 325 MG tablet, Take 2 tablets by mouth every 4 hours as needed for Pain, Disp: , Rfl:     vitamin C (ASCORBIC ACID) 500 MG tablet, Take 1 tablet by mouth daily, Disp: , Rfl:     pantoprazole (PROTONIX) 40 MG tablet, Take 1 tablet by mouth every morning (before breakfast), Disp: 30 tablet, Rfl: 0    lisinopril (PRINIVIL;ZESTRIL) 20 MG tablet, TAKE 1 TABLET BY MOUTH TWICE DAILY, Disp: 180 tablet, Rfl: 1    aspirin EC 81 MG EC tablet, Take 1 tablet by mouth in the morning and at bedtime, Disp: 30 tablet, Rfl: 0    b complex vitamins capsule, Take 1 capsule by mouth Daily with supper, Disp: , Rfl:     vitamin B-12

## 2024-03-28 ENCOUNTER — OFFICE VISIT (OUTPATIENT)
Dept: ORTHOPEDIC SURGERY | Age: 74
End: 2024-03-28
Payer: MEDICARE

## 2024-03-28 DIAGNOSIS — M17.11 PRIMARY OSTEOARTHRITIS OF RIGHT KNEE: Primary | ICD-10-CM

## 2024-03-28 DIAGNOSIS — M17.12 PRIMARY OSTEOARTHRITIS OF LEFT KNEE: ICD-10-CM

## 2024-03-28 PROCEDURE — 20610 DRAIN/INJ JOINT/BURSA W/O US: CPT | Performed by: ORTHOPAEDIC SURGERY

## 2024-04-03 NOTE — PROGRESS NOTES
Chief Complaint   Patient presents with    Injections     Bilateral knee Zilretta injections        I will proceed with a cortisone injection in the Bilateral knee.  Verbal and written consent was obtained for the injections. The skin was prepped with alcohol.  A prepared mixture of 32 mg of Zilretta and 5mL diluent was injected to Bilateral knee. The injection was given through the lateral side of the knee. The patient tolerated the injection well. I will see the patient back prn.    Tere was seen today for injections.    Diagnoses and all orders for this visit:    Primary osteoarthritis of right knee  -     [place] ARTHROCENTESIS ASPIR&/INJ MAJOR JT/BURSA W/O US  -     triamcinolone acetonide (ZILRETTA) intra-articular injection 32 mg    Primary osteoarthritis of left knee  -     [place] ARTHROCENTESIS ASPIR&/INJ MAJOR JT/BURSA W/O US  -     triamcinolone acetonide (ZILRETTA) intra-articular injection 32 mg

## 2024-04-18 ENCOUNTER — APPOINTMENT (OUTPATIENT)
Dept: ULTRASOUND IMAGING | Age: 74
End: 2024-04-18
Payer: MEDICARE

## 2024-04-18 ENCOUNTER — APPOINTMENT (OUTPATIENT)
Dept: GENERAL RADIOLOGY | Age: 74
End: 2024-04-18
Payer: MEDICARE

## 2024-04-18 ENCOUNTER — HOSPITAL ENCOUNTER (EMERGENCY)
Age: 74
Discharge: HOME | End: 2024-04-18
Attending: EMERGENCY MEDICINE
Payer: MEDICARE

## 2024-04-18 VITALS
WEIGHT: 217 LBS | HEIGHT: 61 IN | HEART RATE: 73 BPM | OXYGEN SATURATION: 99 % | SYSTOLIC BLOOD PRESSURE: 165 MMHG | TEMPERATURE: 97.8 F | BODY MASS INDEX: 40.97 KG/M2 | DIASTOLIC BLOOD PRESSURE: 70 MMHG | RESPIRATION RATE: 20 BRPM

## 2024-04-18 DIAGNOSIS — M25.572 CHRONIC PAIN OF BOTH ANKLES: Primary | ICD-10-CM

## 2024-04-18 DIAGNOSIS — M25.571 CHRONIC PAIN OF BOTH ANKLES: Primary | ICD-10-CM

## 2024-04-18 DIAGNOSIS — G89.29 CHRONIC PAIN OF BOTH ANKLES: Primary | ICD-10-CM

## 2024-04-18 PROCEDURE — 93970 EXTREMITY STUDY: CPT

## 2024-04-18 PROCEDURE — 73610 X-RAY EXAM OF ANKLE: CPT

## 2024-04-18 PROCEDURE — 73630 X-RAY EXAM OF FOOT: CPT

## 2024-04-18 PROCEDURE — 99284 EMERGENCY DEPT VISIT MOD MDM: CPT

## 2024-04-18 RX ORDER — HYDROCODONE BITARTRATE AND ACETAMINOPHEN 5; 325 MG/1; MG/1
1 TABLET ORAL ONCE
Status: DISCONTINUED | OUTPATIENT
Start: 2024-04-18 | End: 2024-04-18 | Stop reason: HOSPADM

## 2024-04-18 ASSESSMENT — ENCOUNTER SYMPTOMS
SHORTNESS OF BREATH: 0
WHEEZING: 0
CHEST TIGHTNESS: 0
NAUSEA: 0
DIARRHEA: 0
ABDOMINAL PAIN: 0
SORE THROAT: 0
BACK PAIN: 0
COUGH: 0
VOMITING: 0

## 2024-04-18 ASSESSMENT — LIFESTYLE VARIABLES
HOW MANY STANDARD DRINKS CONTAINING ALCOHOL DO YOU HAVE ON A TYPICAL DAY: PATIENT DOES NOT DRINK
HOW OFTEN DO YOU HAVE A DRINK CONTAINING ALCOHOL: NEVER

## 2024-04-18 ASSESSMENT — PAIN - FUNCTIONAL ASSESSMENT: PAIN_FUNCTIONAL_ASSESSMENT: NONE - DENIES PAIN

## 2024-04-18 NOTE — ED PROVIDER NOTES
Normal rate and regular rhythm.      Heart sounds: Normal heart sounds. No murmur heard.  Pulmonary:      Effort: Pulmonary effort is normal. No respiratory distress.      Breath sounds: Normal breath sounds. No stridor, decreased air movement or transmitted upper airway sounds. No decreased breath sounds, wheezing, rhonchi or rales.   Chest:      Chest wall: No tenderness.   Abdominal:      General: Bowel sounds are normal. There is no distension.      Palpations: Abdomen is soft.      Tenderness: There is no abdominal tenderness. There is no right CVA tenderness, left CVA tenderness, guarding or rebound.   Musculoskeletal:         General: No swelling, tenderness, deformity or signs of injury.      Cervical back: Full passive range of motion without pain, normal range of motion and neck supple. No signs of trauma or rigidity. No pain with movement, spinous process tenderness or muscular tenderness. Normal range of motion.      Right lower leg: Edema present.      Left lower leg: Edema present.      Comments: Legs with diffuse nonpitting edema throughout consistent with her history of lymphedema.  Slight superficial bruising left general ankle with no focality.  General left and right ankle tenderness on palpation with no obvious injuries.  The feet are neurovascular intact with palpable dorsal pedal pulses and pink warm skin.  No specific unilateral leg swelling and no calf pain.  Arms legs otherwise with some general varying arthritic architectural changes with no signs of acute bony or joint injury.  No soft tissue tenderness or swelling otherwise.  No cervical, thoracic or lumbar spine tenderness.   Skin:     General: Skin is warm and dry.      Coloration: Skin is not cyanotic, jaundiced, mottled or pale.      Findings: Bruising present. No erythema or rash.   Neurological:      General: No focal deficit present.      Mental Status: She is alert and oriented to person, place, and time.      GCS: GCS eye

## 2024-06-21 ENCOUNTER — TELEPHONE (OUTPATIENT)
Dept: HEMATOLOGY | Age: 74
End: 2024-06-21

## 2024-06-21 NOTE — TELEPHONE ENCOUNTER
Called Tere to scheduled Mri of her pancreas in July and follow up with Dr. Lindsay. She stated that she did not wish to scheduled these appointments at this time due to her difficulty walking and lack of transportation. She mentioned she has an upcoming appt with Dr. Mckeon and would like to wait until after that appointment. Offered to call Tere back in July. She stated she would rather call us instead. Staff expressed understanding.

## 2024-07-01 ENCOUNTER — OFFICE VISIT (OUTPATIENT)
Dept: ORTHOPEDIC SURGERY | Age: 74
End: 2024-07-01
Payer: MEDICARE

## 2024-07-01 DIAGNOSIS — M17.11 PRIMARY OSTEOARTHRITIS OF RIGHT KNEE: Primary | ICD-10-CM

## 2024-07-01 DIAGNOSIS — M17.12 PRIMARY OSTEOARTHRITIS OF LEFT KNEE: ICD-10-CM

## 2024-07-01 PROCEDURE — 20610 DRAIN/INJ JOINT/BURSA W/O US: CPT | Performed by: ORTHOPAEDIC SURGERY

## 2024-07-02 NOTE — PROGRESS NOTES
Chief Complaint   Patient presents with    Injections     Bilateral knee Zilretta inj        I will proceed with a cortisone injection in the Bilateral knee.  Verbal and written consent was obtained for the injections. The skin was prepped with alcohol.  A prepared mixture of 32 mg of Zilretta and 5mL diluent was injected to Bilateral knee. The injection was given through the lateral side of the knee. The patient tolerated the injection well. I will see the patient back prn.    Tere was seen today for injections.    Diagnoses and all orders for this visit:    Primary osteoarthritis of right knee  -     [place] ARTHROCENTESIS ASPIR&/INJ MAJOR JT/BURSA W/O US  -     triamcinolone acetonide (ZILRETTA) intra-articular injection 32 mg    Primary osteoarthritis of left knee  -     MI ARTHROCENTESIS ASPIR&/INJ MAJOR JT/BURSA W/O US  -     triamcinolone acetonide (ZILRETTA) intra-articular injection 32 mg

## 2024-10-03 ENCOUNTER — OFFICE VISIT (OUTPATIENT)
Dept: ORTHOPEDIC SURGERY | Age: 74
End: 2024-10-03

## 2024-10-03 VITALS — BODY MASS INDEX: 40.97 KG/M2 | WEIGHT: 217 LBS | HEIGHT: 61 IN

## 2024-10-03 DIAGNOSIS — M17.12 PRIMARY OSTEOARTHRITIS OF LEFT KNEE: ICD-10-CM

## 2024-10-03 DIAGNOSIS — M17.11 PRIMARY OSTEOARTHRITIS OF RIGHT KNEE: Primary | ICD-10-CM

## 2024-10-03 NOTE — PROGRESS NOTES
Chief Complaint   Patient presents with    Knee Pain     Bilateral Knee F/U        I will proceed with a cortisone injection in the Bilateral knee.  Verbal and written consent was obtained for the injections. The skin was prepped with alcohol.  A prepared mixture of 32 mg of Zilretta and 5mL diluent was injected to Bilateral knee. The injection was given through the lateral side of the knee. The patient tolerated the injection well. I will see the patient back prn.    Tere was seen today for knee pain.    Diagnoses and all orders for this visit:    Primary osteoarthritis of right knee    Primary osteoarthritis of left knee

## 2024-11-01 RX ORDER — LISINOPRIL 20 MG/1
20 TABLET ORAL 2 TIMES DAILY
Qty: 180 TABLET | Refills: 1 | Status: SHIPPED | OUTPATIENT
Start: 2024-11-01

## 2024-11-01 NOTE — TELEPHONE ENCOUNTER
Last Appointment:  Visit date not found  Future Appointments   Date Time Provider Department Center   11/14/2024 10:50 AM Terry Mckeon DO [facility]        Requested Prescriptions     Pending Prescriptions Disp Refills    lisinopril (PRINIVIL;ZESTRIL) 20 MG tablet 180 tablet 1     Sig: Take 1 tablet by mouth 2 times daily

## 2024-11-14 ENCOUNTER — OFFICE VISIT (OUTPATIENT)
Dept: ORTHOPEDIC SURGERY | Age: 74
End: 2024-11-14

## 2024-11-14 VITALS
WEIGHT: 217 LBS | TEMPERATURE: 98 F | OXYGEN SATURATION: 95 % | BODY MASS INDEX: 40.97 KG/M2 | HEIGHT: 61 IN | RESPIRATION RATE: 16 BRPM | HEART RATE: 71 BPM

## 2024-11-14 DIAGNOSIS — M17.11 PRIMARY OSTEOARTHRITIS OF RIGHT KNEE: Primary | ICD-10-CM

## 2024-11-14 DIAGNOSIS — M17.12 PRIMARY OSTEOARTHRITIS OF LEFT KNEE: ICD-10-CM

## 2024-11-14 NOTE — PROGRESS NOTES
Chief Complaint   Patient presents with    Knee Pain     B/L knee pain follow up. Knees are painful again. They are better then previous visit but overall not doing well. Still having some problems getting out of chair. Right knee starting to buckle when walking.        Tere Lewis returns today for follow-up of her bilateral knee pain. she reports this is worse than when I saw her last.      Past Medical History:   Diagnosis Date    Chronic back pain     Degeneration of lumbar intervertebral disc     Heart murmur     Hypertension     Lymphedema     bilateral legs    Metabolic syndrome     Osteoarthritis     Osteopenia     Pinched nerve     Radiculopathy of lumbar region     Sciatica of left side      Past Surgical History:   Procedure Laterality Date    ABDOMEN SURGERY  1984    UPPER GASTROINTESTINAL ENDOSCOPY N/A 01/05/2024    EGD W/EUS FNA performed by Delano Perez MD at [facility] ENDOSCOPY    VEIN SURGERY Bilateral 2016       Current Outpatient Medications:     lisinopril (PRINIVIL;ZESTRIL) 20 MG tablet, Take 1 tablet by mouth 2 times daily, Disp: 180 tablet, Rfl: 1    diphenhydrAMINE (BENADRYL) 25 MG capsule, Take 1 capsule by mouth every 6 hours as needed for Itching, Disp: , Rfl:     bisacodyl (DULCOLAX) 10 MG suppository, Place 1 suppository rectally daily as needed for Constipation, Disp: , Rfl:     magnesium hydroxide (MILK OF MAGNESIA) 400 MG/5ML suspension, Take 30 mLs by mouth daily as needed for Constipation, Disp: , Rfl:     aluminum & magnesium hydroxide-simethicone (MYLANTA) 400-400-40 MG/5ML SUSP, Take 15 mLs by mouth every 6 hours as needed, Disp: , Rfl:     acetaminophen (TYLENOL) 325 MG tablet, Take 2 tablets by mouth every 4 hours as needed for Pain, Disp: , Rfl:     vitamin C (ASCORBIC ACID) 500 MG tablet, Take 1 tablet by mouth daily, Disp: , Rfl:     pantoprazole (PROTONIX) 40 MG tablet, Take 1 tablet by mouth every morning (before breakfast), Disp: 30 tablet, Rfl: 0    aspirin EC 81 MG

## 2024-11-24 RX ORDER — TRIAMCINOLONE ACETONIDE 40 MG/ML
40 INJECTION, SUSPENSION INTRA-ARTICULAR; INTRAMUSCULAR ONCE
Status: SHIPPED | OUTPATIENT
Start: 2024-11-24

## 2024-12-30 ENCOUNTER — OFFICE VISIT (OUTPATIENT)
Dept: ORTHOPEDIC SURGERY | Age: 74
End: 2024-12-30
Payer: MEDICARE

## 2024-12-30 VITALS — HEIGHT: 61 IN | TEMPERATURE: 98 F | WEIGHT: 195 LBS | OXYGEN SATURATION: 99 % | BODY MASS INDEX: 36.82 KG/M2

## 2024-12-30 DIAGNOSIS — M17.12 PRIMARY OSTEOARTHRITIS OF LEFT KNEE: ICD-10-CM

## 2024-12-30 DIAGNOSIS — M17.11 PRIMARY OSTEOARTHRITIS OF RIGHT KNEE: Primary | ICD-10-CM

## 2024-12-30 PROCEDURE — 1036F TOBACCO NON-USER: CPT | Performed by: ORTHOPAEDIC SURGERY

## 2024-12-30 PROCEDURE — 1125F AMNT PAIN NOTED PAIN PRSNT: CPT | Performed by: ORTHOPAEDIC SURGERY

## 2024-12-30 PROCEDURE — G8400 PT W/DXA NO RESULTS DOC: HCPCS | Performed by: ORTHOPAEDIC SURGERY

## 2024-12-30 PROCEDURE — 1090F PRES/ABSN URINE INCON ASSESS: CPT | Performed by: ORTHOPAEDIC SURGERY

## 2024-12-30 PROCEDURE — G8484 FLU IMMUNIZE NO ADMIN: HCPCS | Performed by: ORTHOPAEDIC SURGERY

## 2024-12-30 PROCEDURE — 20610 DRAIN/INJ JOINT/BURSA W/O US: CPT | Performed by: ORTHOPAEDIC SURGERY

## 2024-12-30 PROCEDURE — G8427 DOCREV CUR MEDS BY ELIG CLIN: HCPCS | Performed by: ORTHOPAEDIC SURGERY

## 2024-12-30 PROCEDURE — 1159F MED LIST DOCD IN RCRD: CPT | Performed by: ORTHOPAEDIC SURGERY

## 2024-12-30 PROCEDURE — G8417 CALC BMI ABV UP PARAM F/U: HCPCS | Performed by: ORTHOPAEDIC SURGERY

## 2024-12-30 PROCEDURE — 99213 OFFICE O/P EST LOW 20 MIN: CPT | Performed by: ORTHOPAEDIC SURGERY

## 2024-12-30 PROCEDURE — 1123F ACP DISCUSS/DSCN MKR DOCD: CPT | Performed by: ORTHOPAEDIC SURGERY

## 2024-12-30 NOTE — PROGRESS NOTES
Other Maternal Grandmother         pernicious anemia    Anemia Maternal Grandmother     No Known Problems Maternal Grandfather     Diabetes Paternal Grandmother     No Known Problems Paternal Grandfather     Osteoarthritis Maternal Cousin     Other Paternal Cousin         ethnic GI issues       Review of Systems:     Skin: (-) rash,(-) psoriasis,(-) eczema, (-)skin cancer.   Musculoskeletal: (-) fractures,  (-) dislocations,(-) collagen vascular disease, (-) fibromyalgia, (-) multiple sclerosis, (-) muscular dystrophy, (-) RSD,(-) joint pain (-)swelling, (-) joint pain,swelling.  Neurologic: (-) epilepsy, (-)seizures,(-) brain tumor,(-) TIA, (-)stroke, (-)headaches, (-)Parkinson disease,(-) memory loss, (-) LOC.  Cardiovascular: (-) Chest pain, (-) swelling in legs/feet, (-) SOB, (-) cramping in legs/feet with walking.    Constitutional:  The patient is alert and oriented x 3, appears to be stated age and in no distress.Temp 98 °F (36.7 °C) (Temporal)   Ht 1.549 m (5' 1\")   Wt 88.5 kg (195 lb)   SpO2 99%   BMI 36.84 kg/m²     Skin:  Upon inspection: the skin appears warm, dry and intact.  There is not a previous scar over the affected area.There is not any cellulitis, lymphedema or cutaneous lesions noted in the lower extremities.   Upon palpation there is no induration noted.      Neurologic:  Gait: normal;  Motor exam of the lower extremities show ; quadriceps, hamstrings, foot dorsi and plantar flexors intact R.  5/5 and L. 5/5. Deep tendon reflexes are 2/4 at the knees and 2/4 at the ankles with strong extensor hallicus longus motor strength bilaterally. Sensory to both feet is intact to all sensory roots.    Cardiovascular:  The vascular exam is normal and is well perfused to distal extremities.  Distal pulses DP/PT: R. 2+; L. 2+.  There is cap refill noted less than two seconds in all digits. There is not edema of the bilateral lower extremities.  There is not varicosities noted in the distal extremities.

## 2024-12-31 ENCOUNTER — HOSPITAL ENCOUNTER (INPATIENT)
Age: 74
LOS: 3 days | Discharge: HOME HEALTH | DRG: 605 | End: 2025-01-03
Attending: STUDENT IN AN ORGANIZED HEALTH CARE EDUCATION/TRAINING PROGRAM | Admitting: INTERNAL MEDICINE
Payer: MEDICARE

## 2024-12-31 ENCOUNTER — APPOINTMENT (OUTPATIENT)
Dept: CT IMAGING | Age: 74
DRG: 605 | End: 2024-12-31
Payer: MEDICARE

## 2024-12-31 DIAGNOSIS — S00.03XA HEMATOMA OF SCALP, INITIAL ENCOUNTER: ICD-10-CM

## 2024-12-31 DIAGNOSIS — S01.01XA LACERATION OF SCALP, INITIAL ENCOUNTER: ICD-10-CM

## 2024-12-31 DIAGNOSIS — R53.1 GENERALIZED WEAKNESS: ICD-10-CM

## 2024-12-31 DIAGNOSIS — S09.90XA INJURY OF HEAD, INITIAL ENCOUNTER: Primary | ICD-10-CM

## 2024-12-31 PROBLEM — R26.2 AMBULATORY DYSFUNCTION: Status: ACTIVE | Noted: 2024-12-31

## 2024-12-31 PROCEDURE — 12002 RPR S/N/AX/GEN/TRNK2.6-7.5CM: CPT

## 2024-12-31 PROCEDURE — 72125 CT NECK SPINE W/O DYE: CPT

## 2024-12-31 PROCEDURE — 0HQ0XZZ REPAIR SCALP SKIN, EXTERNAL APPROACH: ICD-10-PCS | Performed by: INTERNAL MEDICINE

## 2024-12-31 PROCEDURE — 70450 CT HEAD/BRAIN W/O DYE: CPT

## 2024-12-31 PROCEDURE — 99285 EMERGENCY DEPT VISIT HI MDM: CPT

## 2024-12-31 PROCEDURE — 1200000000 HC SEMI PRIVATE

## 2024-12-31 RX ORDER — ACETAMINOPHEN 500 MG
1000 TABLET ORAL ONCE
Status: COMPLETED | OUTPATIENT
Start: 2024-12-31 | End: 2025-01-01

## 2024-12-31 RX ORDER — LIDOCAINE HYDROCHLORIDE AND EPINEPHRINE 10; 10 MG/ML; UG/ML
20 INJECTION, SOLUTION INFILTRATION; PERINEURAL ONCE
Status: COMPLETED | OUTPATIENT
Start: 2024-12-31 | End: 2025-01-01

## 2025-01-01 ENCOUNTER — APPOINTMENT (OUTPATIENT)
Dept: GENERAL RADIOLOGY | Age: 75
DRG: 605 | End: 2025-01-01
Payer: MEDICARE

## 2025-01-01 LAB
ALBUMIN SERPL-MCNC: 3.8 G/DL (ref 3.5–5.2)
ALP SERPL-CCNC: 95 U/L (ref 35–104)
ALT SERPL-CCNC: 23 U/L (ref 0–32)
ANION GAP SERPL CALCULATED.3IONS-SCNC: 11 MMOL/L (ref 7–16)
AST SERPL-CCNC: 27 U/L (ref 0–31)
BASOPHILS # BLD: 0.03 K/UL (ref 0–0.2)
BASOPHILS # BLD: 0.03 K/UL (ref 0–0.2)
BASOPHILS NFR BLD: 0 % (ref 0–2)
BASOPHILS NFR BLD: 0 % (ref 0–2)
BILIRUB SERPL-MCNC: 0.3 MG/DL (ref 0–1.2)
BILIRUB UR QL STRIP: NEGATIVE
BUN SERPL-MCNC: 25 MG/DL (ref 6–23)
CALCIUM SERPL-MCNC: 8.8 MG/DL (ref 8.6–10.2)
CHLORIDE SERPL-SCNC: 107 MMOL/L (ref 98–107)
CLARITY UR: CLEAR
CO2 SERPL-SCNC: 18 MMOL/L (ref 22–29)
COLOR UR: YELLOW
CREAT SERPL-MCNC: 0.6 MG/DL (ref 0.5–1)
EOSINOPHIL # BLD: 0.01 K/UL (ref 0.05–0.5)
EOSINOPHIL # BLD: 0.01 K/UL (ref 0.05–0.5)
EOSINOPHILS RELATIVE PERCENT: 0 % (ref 0–6)
EOSINOPHILS RELATIVE PERCENT: 0 % (ref 0–6)
ERYTHROCYTE [DISTWIDTH] IN BLOOD BY AUTOMATED COUNT: 13.5 % (ref 11.5–15)
ERYTHROCYTE [DISTWIDTH] IN BLOOD BY AUTOMATED COUNT: 14.2 % (ref 11.5–15)
GFR, ESTIMATED: >90 ML/MIN/1.73M2
GLUCOSE SERPL-MCNC: 150 MG/DL (ref 74–99)
GLUCOSE UR STRIP-MCNC: NEGATIVE MG/DL
HCT VFR BLD AUTO: 31.9 % (ref 34–48)
HCT VFR BLD AUTO: 34.3 % (ref 34–48)
HGB BLD-MCNC: 11.2 G/DL (ref 11.5–15.5)
HGB BLD-MCNC: 12.1 G/DL (ref 11.5–15.5)
HGB UR QL STRIP.AUTO: NEGATIVE
IMM GRANULOCYTES # BLD AUTO: 0.05 K/UL (ref 0–0.58)
IMM GRANULOCYTES # BLD AUTO: 0.07 K/UL (ref 0–0.58)
IMM GRANULOCYTES NFR BLD: 1 % (ref 0–5)
IMM GRANULOCYTES NFR BLD: 1 % (ref 0–5)
KETONES UR STRIP-MCNC: NEGATIVE MG/DL
LEUKOCYTE ESTERASE UR QL STRIP: NEGATIVE
LYMPHOCYTES NFR BLD: 1.52 K/UL (ref 1.5–4)
LYMPHOCYTES NFR BLD: 1.96 K/UL (ref 1.5–4)
LYMPHOCYTES RELATIVE PERCENT: 14 % (ref 20–42)
LYMPHOCYTES RELATIVE PERCENT: 19 % (ref 20–42)
MAGNESIUM SERPL-MCNC: 2 MG/DL (ref 1.6–2.6)
MCH RBC QN AUTO: 32 PG (ref 26–35)
MCH RBC QN AUTO: 32.9 PG (ref 26–35)
MCHC RBC AUTO-ENTMCNC: 35.1 G/DL (ref 32–34.5)
MCHC RBC AUTO-ENTMCNC: 35.3 G/DL (ref 32–34.5)
MCV RBC AUTO: 91.1 FL (ref 80–99.9)
MCV RBC AUTO: 93.2 FL (ref 80–99.9)
MONOCYTES NFR BLD: 0.65 K/UL (ref 0.1–0.95)
MONOCYTES NFR BLD: 0.83 K/UL (ref 0.1–0.95)
MONOCYTES NFR BLD: 6 % (ref 2–12)
MONOCYTES NFR BLD: 7 % (ref 2–12)
NEUTROPHILS NFR BLD: 74 % (ref 43–80)
NEUTROPHILS NFR BLD: 78 % (ref 43–80)
NEUTS SEG NFR BLD: 7.7 K/UL (ref 1.8–7.3)
NEUTS SEG NFR BLD: 8.79 K/UL (ref 1.8–7.3)
NITRITE UR QL STRIP: NEGATIVE
PH UR STRIP: 6 [PH] (ref 5–9)
PHOSPHATE SERPL-MCNC: 3.1 MG/DL (ref 2.5–4.5)
PLATELET # BLD AUTO: 208 K/UL (ref 130–450)
PLATELET, FLUORESCENCE: 221 K/UL (ref 130–450)
PMV BLD AUTO: 9.3 FL (ref 7–12)
PMV BLD AUTO: 9.3 FL (ref 7–12)
POTASSIUM SERPL-SCNC: 4.3 MMOL/L (ref 3.5–5)
PROCALCITONIN SERPL-MCNC: 0.05 NG/ML (ref 0–0.08)
PROT SERPL-MCNC: 6.6 G/DL (ref 6.4–8.3)
PROT UR STRIP-MCNC: NEGATIVE MG/DL
RBC # BLD AUTO: 3.5 M/UL (ref 3.5–5.5)
RBC # BLD AUTO: 3.68 M/UL (ref 3.5–5.5)
RBC #/AREA URNS HPF: NORMAL /HPF
SODIUM SERPL-SCNC: 136 MMOL/L (ref 132–146)
SP GR UR STRIP: 1.02 (ref 1–1.03)
T4 FREE SERPL-MCNC: 0.9 NG/DL (ref 0.9–1.7)
TSH SERPL DL<=0.05 MIU/L-ACNC: 2.22 UIU/ML (ref 0.27–4.2)
UROBILINOGEN UR STRIP-ACNC: 0.2 EU/DL (ref 0–1)
WBC #/AREA URNS HPF: NORMAL /HPF
WBC OTHER # BLD: 10.4 K/UL (ref 4.5–11.5)
WBC OTHER # BLD: 11.3 K/UL (ref 4.5–11.5)

## 2025-01-01 PROCEDURE — 6370000000 HC RX 637 (ALT 250 FOR IP): Performed by: INTERNAL MEDICINE

## 2025-01-01 PROCEDURE — 6360000002 HC RX W HCPCS

## 2025-01-01 PROCEDURE — 83735 ASSAY OF MAGNESIUM: CPT

## 2025-01-01 PROCEDURE — 90471 IMMUNIZATION ADMIN: CPT

## 2025-01-01 PROCEDURE — 6360000002 HC RX W HCPCS: Performed by: STUDENT IN AN ORGANIZED HEALTH CARE EDUCATION/TRAINING PROGRAM

## 2025-01-01 PROCEDURE — 84439 ASSAY OF FREE THYROXINE: CPT

## 2025-01-01 PROCEDURE — 81001 URINALYSIS AUTO W/SCOPE: CPT

## 2025-01-01 PROCEDURE — 87086 URINE CULTURE/COLONY COUNT: CPT

## 2025-01-01 PROCEDURE — 6370000000 HC RX 637 (ALT 250 FOR IP): Performed by: STUDENT IN AN ORGANIZED HEALTH CARE EDUCATION/TRAINING PROGRAM

## 2025-01-01 PROCEDURE — 84100 ASSAY OF PHOSPHORUS: CPT

## 2025-01-01 PROCEDURE — 6370000000 HC RX 637 (ALT 250 FOR IP): Performed by: NURSE PRACTITIONER

## 2025-01-01 PROCEDURE — 84145 PROCALCITONIN (PCT): CPT

## 2025-01-01 PROCEDURE — 90714 TD VACC NO PRESV 7 YRS+ IM: CPT

## 2025-01-01 PROCEDURE — 80053 COMPREHEN METABOLIC PANEL: CPT

## 2025-01-01 PROCEDURE — 1200000000 HC SEMI PRIVATE

## 2025-01-01 PROCEDURE — 71046 X-RAY EXAM CHEST 2 VIEWS: CPT

## 2025-01-01 PROCEDURE — 2500000003 HC RX 250 WO HCPCS: Performed by: INTERNAL MEDICINE

## 2025-01-01 PROCEDURE — 84443 ASSAY THYROID STIM HORMONE: CPT

## 2025-01-01 PROCEDURE — 85025 COMPLETE CBC W/AUTO DIFF WBC: CPT

## 2025-01-01 RX ORDER — POTASSIUM CHLORIDE 7.45 MG/ML
10 INJECTION INTRAVENOUS PRN
Status: DISCONTINUED | OUTPATIENT
Start: 2025-01-01 | End: 2025-01-03 | Stop reason: HOSPADM

## 2025-01-01 RX ORDER — SODIUM CHLORIDE 0.9 % (FLUSH) 0.9 %
5-40 SYRINGE (ML) INJECTION PRN
Status: DISCONTINUED | OUTPATIENT
Start: 2025-01-01 | End: 2025-01-03 | Stop reason: HOSPADM

## 2025-01-01 RX ORDER — ACETAMINOPHEN 325 MG/1
650 TABLET ORAL EVERY 6 HOURS PRN
Status: DISCONTINUED | OUTPATIENT
Start: 2025-01-01 | End: 2025-01-02

## 2025-01-01 RX ORDER — GLUCAGON 1 MG/ML
1 KIT INJECTION PRN
Status: DISCONTINUED | OUTPATIENT
Start: 2025-01-01 | End: 2025-01-01

## 2025-01-01 RX ORDER — ACETAMINOPHEN 650 MG/1
650 SUPPOSITORY RECTAL EVERY 6 HOURS PRN
Status: DISCONTINUED | OUTPATIENT
Start: 2025-01-01 | End: 2025-01-02

## 2025-01-01 RX ORDER — SODIUM CHLORIDE 9 MG/ML
INJECTION, SOLUTION INTRAVENOUS PRN
Status: DISCONTINUED | OUTPATIENT
Start: 2025-01-01 | End: 2025-01-03 | Stop reason: HOSPADM

## 2025-01-01 RX ORDER — POLYETHYLENE GLYCOL 3350 17 G/17G
17 POWDER, FOR SOLUTION ORAL DAILY PRN
Status: DISCONTINUED | OUTPATIENT
Start: 2025-01-01 | End: 2025-01-03 | Stop reason: HOSPADM

## 2025-01-01 RX ORDER — MAGNESIUM SULFATE IN WATER 40 MG/ML
2000 INJECTION, SOLUTION INTRAVENOUS PRN
Status: DISCONTINUED | OUTPATIENT
Start: 2025-01-01 | End: 2025-01-03 | Stop reason: HOSPADM

## 2025-01-01 RX ORDER — LISINOPRIL 20 MG/1
20 TABLET ORAL 2 TIMES DAILY
Status: DISCONTINUED | OUTPATIENT
Start: 2025-01-01 | End: 2025-01-03 | Stop reason: HOSPADM

## 2025-01-01 RX ORDER — MORPHINE SULFATE 2 MG/ML
2 INJECTION, SOLUTION INTRAMUSCULAR; INTRAVENOUS ONCE
Status: COMPLETED | OUTPATIENT
Start: 2025-01-01 | End: 2025-01-01

## 2025-01-01 RX ORDER — POTASSIUM CHLORIDE 1500 MG/1
40 TABLET, EXTENDED RELEASE ORAL PRN
Status: DISCONTINUED | OUTPATIENT
Start: 2025-01-01 | End: 2025-01-03 | Stop reason: HOSPADM

## 2025-01-01 RX ORDER — DEXTROSE MONOHYDRATE 100 MG/ML
INJECTION, SOLUTION INTRAVENOUS CONTINUOUS PRN
Status: DISCONTINUED | OUTPATIENT
Start: 2025-01-01 | End: 2025-01-01

## 2025-01-01 RX ORDER — SODIUM CHLORIDE 0.9 % (FLUSH) 0.9 %
5-40 SYRINGE (ML) INJECTION EVERY 12 HOURS SCHEDULED
Status: DISCONTINUED | OUTPATIENT
Start: 2025-01-01 | End: 2025-01-03 | Stop reason: HOSPADM

## 2025-01-01 RX ADMIN — MORPHINE SULFATE 2 MG: 2 INJECTION, SOLUTION INTRAMUSCULAR; INTRAVENOUS at 05:31

## 2025-01-01 RX ADMIN — SODIUM CHLORIDE, PRESERVATIVE FREE 10 ML: 5 INJECTION INTRAVENOUS at 09:02

## 2025-01-01 RX ADMIN — ACETAMINOPHEN 650 MG: 325 TABLET ORAL at 12:59

## 2025-01-01 RX ADMIN — ACETAMINOPHEN 1000 MG: 500 TABLET ORAL at 00:20

## 2025-01-01 RX ADMIN — LIDOCAINE HYDROCHLORIDE AND EPINEPHRINE 20 ML: 10; 10 INJECTION, SOLUTION INFILTRATION; PERINEURAL at 00:09

## 2025-01-01 RX ADMIN — CLOSTRIDIUM TETANI TOXOID ANTIGEN (FORMALDEHYDE INACTIVATED) AND CORYNEBACTERIUM DIPHTHERIAE TOXOID ANTIGEN (FORMALDEHYDE INACTIVATED) 0.5 ML: 5; 2 INJECTION, SUSPENSION INTRAMUSCULAR at 00:22

## 2025-01-01 RX ADMIN — LISINOPRIL 20 MG: 20 TABLET ORAL at 09:02

## 2025-01-01 RX ADMIN — SODIUM CHLORIDE, PRESERVATIVE FREE 10 ML: 5 INJECTION INTRAVENOUS at 20:22

## 2025-01-01 RX ADMIN — ACETAMINOPHEN 650 MG: 325 TABLET ORAL at 20:21

## 2025-01-01 RX ADMIN — LISINOPRIL 20 MG: 20 TABLET ORAL at 20:22

## 2025-01-01 ASSESSMENT — PAIN SCALES - GENERAL
PAINLEVEL_OUTOF10: 2
PAINLEVEL_OUTOF10: 5
PAINLEVEL_OUTOF10: 4
PAINLEVEL_OUTOF10: 5

## 2025-01-01 ASSESSMENT — PAIN DESCRIPTION - LOCATION
LOCATION: HEAD

## 2025-01-01 NOTE — ED NOTES
Lab called about bmp test that was sent. Lab stated they called around 0120 to state specimen was rejected. This RN was not notified of this call.

## 2025-01-01 NOTE — ED PROVIDER NOTES
[facility] EMERGENCY DEPARTMENT  EMERGENCY DEPARTMENT ENCOUNTER        Pt Name: Tere Lewis  MRN: [id number]  Birthdate 1950  Date of evaluation: 12/31/2024  Provider: López Avila MD  PCP: Awadalla, Bahaa A, MD  Note Started: 9:55 PM EST 12/31/24    CHIEF COMPLAINT       Chief Complaint   Patient presents with    Fall     From standing, -loc, -thinners, lac to back of head        HISTORY OF PRESENT ILLNESS: 1 or more Elements        Limitations to history : None    Tere Lewis is a 74 y.o. female who presents to the emergency department for mechanical fall.  Patient has issues with her right leg due to edema and an ankle problem from a prior fracture.  She got caught on a piece of carpet, fell backwards, hitting her head on on a corner of a table.  Did not lose consciousness, is not on blood thinners.  Denies anything feeling weak, tingly, or numb.  Denies any neck pain.  Did discuss with patient if she felt safe at home and offered skilled nursing facility for which he was not interested    Nursing Notes were all reviewed and agreed with or any disagreements were addressed in the HPI.      REVIEW OF EXTERNAL NOTE :       Orthopedics visit yesterday, patient has a history of osteoarthritis of her knees bilateral    REVIEW OF SYSTEMS :      Positives and Pertinent negatives as per HPI.     SURGICAL HISTORY     Past Surgical History:   Procedure Laterality Date    ABDOMEN SURGERY  1984    UPPER GASTROINTESTINAL ENDOSCOPY N/A 01/05/2024    EGD W/EUS FNA performed by Delano Perez MD at [facility] ENDOSCOPY    VEIN SURGERY Bilateral 2016       CURRENTMEDICATIONS       Previous Medications    B COMPLEX VITAMINS CAPSULE    Take 1 capsule by mouth Daily with supper    CALCIUM CARBONATE 1500 (600 CA) MG TABS TABLET    Take 1 tablet by mouth daily    CHOLECALCIFEROL (VITAMIN D) 50 MCG (2000 UT) CAPS CAPSULE    Take 1 capsule by mouth in the morning and at bedtime    COD LIVER  injury    Amount and/or Complexity of Data Reviewed  External Data Reviewed: notes.  Labs: ordered. Decision-making details documented in ED Course.  Radiology: ordered and independent interpretation performed. Decision-making details documented in ED Course.    Risk  OTC drugs.  Prescription drug management.  Decision regarding hospitalization.              CONSULTS: (Who and What was discussed)  None        I am the Primary Clinician of Record.    FINAL IMPRESSION      1. Injury of head, initial encounter    2. Laceration of scalp, initial encounter    3. Hematoma of scalp, initial encounter    4. Generalized weakness          DISPOSITION/PLAN     DISPOSITION Admitted 12/31/2024 11:59:04 PM      PATIENT REFERRED TO:  Awadalla, Bahaa A, MD  [address] [address] [phone]    Call in 1 day        DISCHARGE MEDICATIONS:  New Prescriptions    No medications on file       DISCONTINUED MEDICATIONS:  Discontinued Medications    No medications on file              (Please note that portions of this note were completed with a voice recognition program.  Efforts were made to edit the dictations but occasionally words are mis-transcribed.)    López Avila MD (electronically signed)            López Avila MD  01/01/25 0012

## 2025-01-01 NOTE — H&P
Department of Internal Medicine  History and Physical    PCP: Awadalla, Bahaa A, MD  Admitting Physician: Dr. Stein/Travis  Consultants:  Date of Service: 1/2/2025    CHIEF COMPLAINT:  fall/ambulatory dysfunction     HISTORY OF PRESENT ILLNESS:    Patient is 74-year-old female presented to the ED following fall.  Patient states that she was using a walker trying to make a turn when she lost balance and fell backward and hit her head.  She developed a laceration to her scalp.  Otherwise she does not have any complaints of lightheadedness or dizziness.  Denies any chest pain.  However she is unable to walk and is afraid of another fall.        PAST MEDICAL Hx:  Past Medical History:   Diagnosis Date    Chronic back pain     Degeneration of lumbar intervertebral disc     Heart murmur     Hypertension     Lymphedema     bilateral legs    Metabolic syndrome     Osteoarthritis     Osteopenia     Pinched nerve     Radiculopathy of lumbar region     Sciatica of left side        PAST SURGICAL Hx:   Past Surgical History:   Procedure Laterality Date    ABDOMEN SURGERY  1984    UPPER GASTROINTESTINAL ENDOSCOPY N/A 01/05/2024    EGD W/EUS FNA performed by Delano Perez MD at [facility] ENDOSCOPY    VEIN SURGERY Bilateral 2016       FAMILY Hx:  Family History   Problem Relation Age of Onset    Arthritis Mother     High Blood Pressure Mother     Heart Failure Mother     Alzheimer's Disease Father     Abdominal aortic aneurysm Father     Diabetes Maternal Aunt     Cancer Maternal Uncle         stomach    No Known Problems Paternal Aunt     Other Paternal Uncle         ethnic GI issues    Other Maternal Grandmother         pernicious anemia    Anemia Maternal Grandmother     No Known Problems Maternal Grandfather     Diabetes Paternal Grandmother     No Known Problems Paternal Grandfather     Osteoarthritis Maternal Cousin     Other Paternal Cousin         ethnic GI issues       HOME MEDICATIONS:  Prior to Admission medications

## 2025-01-01 NOTE — ED PROVIDER NOTES
[facility] EMERGENCY DEPARTMENT  EMERGENCY DEPARTMENT ENCOUNTER        Pt Name: Tere Lewis  MRN: [id number]  Birthdate 1950  Date of evaluation: 12/31/2024  Provider: Andrzej Freeman DO  PCP: Awadalla, Bahaa A, MD  Note Started: 2:46 AM EST 1/1/25    CHIEF COMPLAINT       Chief Complaint   Patient presents with    Fall     From standing, -loc, -thinners, lac to back of head        HISTORY OF PRESENT ILLNESS: 1 or more Elements   History received from: Patient    Tere Lewis is a 74 y.o. female who presents to the emergency department with chief complaint of fall.  Patient states this happened just prior to arrival in the emergency department where she was ambulating and tripped and fell and hit the left side of her head.  States that she hit her head on the table.  Did not lose consciousness.  Does not take any blood thinners.  Other than headache and some bleeding has no other complaints.  Denies other symptoms including numbness or weakness and has no neck pain.  Denies any fever, chills, nausea or vomiting, chest pain, shortness of breath, abdominal pain, hematuria or dysuria, constipation or diarrhea.  Also denies any saddle anesthesia or weakness in the lower extremities.  No loss of control bowels or bladder.  Unknown last tetanus booster.    Nursing Notes were all reviewed and agreed with or any disagreements were addressed in the HPI.    REVIEW OF SYSTEMS :    Positives and Pertinent negatives as per HPI.    PAST MEDICAL HISTORY/Chronic Conditions Affecting Care    has a past medical history of Chronic back pain, Degeneration of lumbar intervertebral disc, Heart murmur, Hypertension, Lymphedema, Metabolic syndrome, Osteoarthritis, Osteopenia, Pinched nerve, Radiculopathy of lumbar region, and Sciatica of left side.     SURGICAL HISTORY     Past Surgical History:   Procedure Laterality Date    ABDOMEN SURGERY  1984    UPPER GASTROINTESTINAL ENDOSCOPY N/A 01/05/2024

## 2025-01-01 NOTE — DISCHARGE INSTRUCTIONS
You were seen in the emergency department today for your fall and scalp laceration.  Your CAT scan of your head did not show evidence of bleeding in your head, and did not show evidence of fractured skull, or fracture in your cervical spine.  You did have a wound on the back your head that was bleeding profusely for which we were able to control with a compressive bandage, and some staples, I would recommend keeping that bandage on for the next 24 hours.  The staples should be removed in 10 to 14 days, you can follow-up with your PCP for that, or return to any emergency department or urgent care to have it removed.  If you start experiencing issues with significant headaches, strokelike symptoms, or you are bleeding please return to the emergency department.      Your information:  Name: Tere Lewis  : 1950    Your instructions:    IF YOU EXPERIENCE ANY OF THE FOLLOWING SYMPTOMS, CHEST PAIN, SHORTNESS OF BREATH, COUGHING UP BLOOD OR BLOODY SPUTUM, STOMACH PAIN OR CRAMPING, DARK, TARRY STOOLS, LOSS OF APPETITE, GENERAL NOT FEELING WELL, SIGNS AND SYMPTOMS OF INFECTION LIKE FEVER AND OR CHILLS, PLEASE CALL YOUR FAMILY DOCTOR OR RETURN TO THE EMERGENCY ROOM.     What to do after you leave the hospital:    Recommended diet: {diet:67782}    Recommended activity: {discharge activity:92363}        The following personal items were collected during your admission and were returned to you:    Belongings  Dental Appliances: None  Vision - Corrective Lenses: Eyeglasses, At bedside  Hearing Aid: None  Clothing: Shirt, Undergarments, At bedside, Pants  Jewelry: Ring, Earrings  Electronic Devices: Cell Phone  Weapons (Notify Protective Services/Security): None  Home Medications: None  Valuables Given To: Patient  Provide Name(s) of Who Valuable(s) Were Given To: Tere Lewis    Information obtained by:  By signing below, I understand that if any problems occur once I leave the hospital I am to contact ***.  I  I understand and acknowledge receipt of the instructions indicated above.

## 2025-01-01 NOTE — PROGRESS NOTES
Internal Medicine Progress Note     [facility]     Jamil Stein D.O., DOREEN Robles D.O., DOREEN Fishman D.O.     Judie Zaragoza, MSN, APRN, NP-C  Elgin Dorantes, MSN, APRN-CNP  Chong Vargas, MSN, APRN, NP-C  Claudia Rodriguez, MSN, APRN-CNP  Hope Tabares, MSN, APRN, NP-C     Primary Care Physician: Awadalla, Bahaa A, MD   Admitting Physician:  Ryan Robles DO  Admission date and time: 12/31/2024  9:51 PM    Room:  [place] [address]  Admitting diagnosis: Ambulatory dysfunction [R26.2]    Patient Name: Tere Lewis  MRN: [id number]    Date of Service: 1/1/2025     Subjective:  Tere is a 74 y.o. female who was seen and examined today,1/1/2025, at the bedside.  She has been boarded in the ER since admission.  She endorses weakness and deconditioning with difficulty performing activities of daily living following the mechanical fall.  We have discussed plan for admission, further evaluation and aggressive therapy.  We have recommended skilled nursing facility placement and she is reluctant presently.  No new symptoms or concerns.  No family present during my examination.    Review of systems:  Constitutional:   - fatigue and malaise , - fever/chills  HEENT:   Denies ear pain, sore throat, sinus or eye problems.  Cardiovascular:   - chest pain, - palpitations, - history of irregular heart beats/arrhythmia, - chronically anticoagulated  Respiratory:   - dyspnea at rest, - dyspnea on exertion, - coughing, - sputum, - hemoptysis  Gastrointestinal:   - nausea, -vomiting. - bowel pattern changes. - hematochezia. - melena. - poor appetite and poor intake. - abdominal pain.  Genitourinary:    Denies any urgency, frequency, hematuria. Voiding  without difficulty.  Extremities:   + Hinged brace to the left lower extremity, chronic bilateral lower extremity edema, difficulty ambulating and bearing weight on the right lower extremity chronic  Neurology:   to maintain hospitalization at this facility while accepting these risks.  We have made every attempt to coordinate care with the ER nursing staff as well to avoid any disruptions in care.        TEMO Fierro CNP  1/1/2025  6:16 AM

## 2025-01-01 NOTE — PROGRESS NOTES
jasmina Renner provided prayer/blessings and Sacraments of Anointing of the Sick and Eucharist with patient. A close friend was present for the visit. 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  She was deeply comforted by the prayer and Sacraments of her Judaism marcy.

## 2025-01-01 NOTE — PROGRESS NOTES
4 Eyes Skin Assessment     NAME:  Tere Lewis  YOB: 1950  MEDICAL RECORD NUMBER:  [id number]    The patient is being assessed for  Admission    I agree that at least one RN has performed a thorough Head to Toe Skin Assessment on the patient. ALL assessment sites listed below have been assessed.      Areas assessed by both nurses:    Head, Face, Ears, Shoulders, Back, Chest, Arms, Elbows, Hands, Sacrum. Buttock, Coccyx, Ischium, and Legs. Feet and Heels  Laceration back of head      Does the Patient have a Wound? No noted wound(s)       Abdifatah Prevention initiated by RN: No  Wound Care Orders initiated by RN: No    Pressure Injury (Stage 3,4, Unstageable, DTI, NWPT, and Complex wounds) if present, place Wound referral order by RN under : No    New Ostomies, if present place, Ostomy referral order under : No     Nurse 1 eSignature: Electronically signed by Lorraine Vegas RN on 1/1/25 at 10:31 AM EST    **SHARE this note so that the co-signing nurse can place an eSignature**    Nurse 2 eSignature: Electronically signed by Kiarra London RN on 1/1/25 at 3:24 PM EST

## 2025-01-02 LAB
ALBUMIN SERPL-MCNC: 3.6 G/DL (ref 3.5–5.2)
ALP SERPL-CCNC: 94 U/L (ref 35–104)
ALT SERPL-CCNC: 20 U/L (ref 0–32)
ANION GAP SERPL CALCULATED.3IONS-SCNC: 13 MMOL/L (ref 7–16)
AST SERPL-CCNC: 19 U/L (ref 0–31)
BASOPHILS # BLD: 0.05 K/UL (ref 0–0.2)
BASOPHILS NFR BLD: 1 % (ref 0–2)
BILIRUB SERPL-MCNC: 0.3 MG/DL (ref 0–1.2)
BUN SERPL-MCNC: 18 MG/DL (ref 6–23)
CALCIUM SERPL-MCNC: 8.9 MG/DL (ref 8.6–10.2)
CHLORIDE SERPL-SCNC: 103 MMOL/L (ref 98–107)
CO2 SERPL-SCNC: 19 MMOL/L (ref 22–29)
CREAT SERPL-MCNC: 0.6 MG/DL (ref 0.5–1)
EOSINOPHIL # BLD: 0.04 K/UL (ref 0.05–0.5)
EOSINOPHILS RELATIVE PERCENT: 1 % (ref 0–6)
ERYTHROCYTE [DISTWIDTH] IN BLOOD BY AUTOMATED COUNT: 13.5 % (ref 11.5–15)
GFR, ESTIMATED: >90 ML/MIN/1.73M2
GLUCOSE SERPL-MCNC: 141 MG/DL (ref 74–99)
HCT VFR BLD AUTO: 33.2 % (ref 34–48)
HGB BLD-MCNC: 11.5 G/DL (ref 11.5–15.5)
IMM GRANULOCYTES # BLD AUTO: 0.05 K/UL (ref 0–0.58)
IMM GRANULOCYTES NFR BLD: 1 % (ref 0–5)
LYMPHOCYTES NFR BLD: 2.44 K/UL (ref 1.5–4)
LYMPHOCYTES RELATIVE PERCENT: 28 % (ref 20–42)
MCH RBC QN AUTO: 32.1 PG (ref 26–35)
MCHC RBC AUTO-ENTMCNC: 34.6 G/DL (ref 32–34.5)
MCV RBC AUTO: 92.7 FL (ref 80–99.9)
MONOCYTES NFR BLD: 0.72 K/UL (ref 0.1–0.95)
MONOCYTES NFR BLD: 8 % (ref 2–12)
NEUTROPHILS NFR BLD: 62 % (ref 43–80)
NEUTS SEG NFR BLD: 5.51 K/UL (ref 1.8–7.3)
PLATELET # BLD AUTO: 203 K/UL (ref 130–450)
PMV BLD AUTO: 9.1 FL (ref 7–12)
POTASSIUM SERPL-SCNC: 4.1 MMOL/L (ref 3.5–5)
PROT SERPL-MCNC: 6.5 G/DL (ref 6.4–8.3)
RBC # BLD AUTO: 3.58 M/UL (ref 3.5–5.5)
SODIUM SERPL-SCNC: 135 MMOL/L (ref 132–146)
WBC OTHER # BLD: 8.8 K/UL (ref 4.5–11.5)

## 2025-01-02 PROCEDURE — 97535 SELF CARE MNGMENT TRAINING: CPT

## 2025-01-02 PROCEDURE — 80053 COMPREHEN METABOLIC PANEL: CPT

## 2025-01-02 PROCEDURE — 2500000003 HC RX 250 WO HCPCS: Performed by: INTERNAL MEDICINE

## 2025-01-02 PROCEDURE — 6370000000 HC RX 637 (ALT 250 FOR IP): Performed by: INTERNAL MEDICINE

## 2025-01-02 PROCEDURE — 36415 COLL VENOUS BLD VENIPUNCTURE: CPT

## 2025-01-02 PROCEDURE — 97530 THERAPEUTIC ACTIVITIES: CPT

## 2025-01-02 PROCEDURE — 85025 COMPLETE CBC W/AUTO DIFF WBC: CPT

## 2025-01-02 PROCEDURE — 6370000000 HC RX 637 (ALT 250 FOR IP): Performed by: NURSE PRACTITIONER

## 2025-01-02 PROCEDURE — 1200000000 HC SEMI PRIVATE

## 2025-01-02 PROCEDURE — 97165 OT EVAL LOW COMPLEX 30 MIN: CPT

## 2025-01-02 PROCEDURE — 97161 PT EVAL LOW COMPLEX 20 MIN: CPT | Performed by: PHYSICAL THERAPIST

## 2025-01-02 PROCEDURE — 6370000000 HC RX 637 (ALT 250 FOR IP)

## 2025-01-02 PROCEDURE — 97530 THERAPEUTIC ACTIVITIES: CPT | Performed by: PHYSICAL THERAPIST

## 2025-01-02 RX ORDER — ACETAMINOPHEN 500 MG
500 TABLET ORAL EVERY 6 HOURS PRN
Status: DISCONTINUED | OUTPATIENT
Start: 2025-01-02 | End: 2025-01-03 | Stop reason: HOSPADM

## 2025-01-02 RX ADMIN — ACETAMINOPHEN 650 MG: 325 TABLET ORAL at 02:24

## 2025-01-02 RX ADMIN — SODIUM CHLORIDE, PRESERVATIVE FREE 10 ML: 5 INJECTION INTRAVENOUS at 20:16

## 2025-01-02 RX ADMIN — LISINOPRIL 20 MG: 20 TABLET ORAL at 08:32

## 2025-01-02 RX ADMIN — ACETAMINOPHEN 500 MG: 500 TABLET ORAL at 18:44

## 2025-01-02 RX ADMIN — SODIUM CHLORIDE, PRESERVATIVE FREE 10 ML: 5 INJECTION INTRAVENOUS at 08:32

## 2025-01-02 RX ADMIN — LISINOPRIL 20 MG: 20 TABLET ORAL at 20:15

## 2025-01-02 ASSESSMENT — PAIN SCALES - GENERAL: PAINLEVEL_OUTOF10: 1

## 2025-01-02 ASSESSMENT — PAIN DESCRIPTION - LOCATION: LOCATION: HEAD

## 2025-01-02 NOTE — PLAN OF CARE
Problem: Safety - Adult  Goal: Free from fall injury  1/2/2025 1054 by Lorraine Vegas, RN  Outcome: Progressing  1/2/2025 0221 by Rosita Liu, RN  Outcome: Progressing

## 2025-01-02 NOTE — CARE COORDINATION
Case Management Assessment  Initial Evaluation    Date/Time of Evaluation: 1/2/2025 10:51 AM  Assessment Completed by: PATY Cruz    If patient is discharged prior to next notation, then this note serves as note for discharge by case management.    Patient Name: Tere Lewis                   YOB: 1950  Diagnosis: Generalized weakness [R53.1]  Hematoma of scalp, initial encounter [S00.03XA]  Injury of head, initial encounter [S09.90XA]  Laceration of scalp, initial encounter [S01.01XA]  Ambulatory dysfunction [R26.2]                   Date / Time: 12/31/2024  9:51 PM    Patient Admission Status: Inpatient   Readmission Risk (Low < 19, Mod (19-27), High > 27): Readmission Risk Score: 11.1    Current PCP: Awadalla, Bahaa A, MD  PCP verified by CM? Yes    Chart Reviewed: Yes      History Provided by: Patient  Patient Orientation: Alert and Oriented    Patient Cognition: Alert    Hospitalization in the last 30 days (Readmission):  No    If yes, Readmission Assessment in  Navigator will be completed.    Advance Directives:      Code Status: DNR-CC   Patient's Primary Decision Maker is: Legal Next of Kin    Primary Decision Maker: KarlChong - [facility] - [phone]    Discharge Planning:    Patient lives with: Alone Type of Home: House  Primary Care Giver: Self  Patient Support Systems include: Friends/Neighbors   Current Financial resources: Medicare  Current community resources: ECF/Home Care  Current services prior to admission: Home Care            Current DME:              Type of Home Care services:  Aide Services    ADLS  Prior functional level: Assistance with the following:, Mobility, Bathing  Current functional level: Assistance with the following:, Mobility, Bathing    PT AM-PAC:   /24  OT AM-PAC:   /24    Family can provide assistance at DC: Yes  Would you like Case Management to discuss the discharge plan with any other family members/significant others, and if so, who?

## 2025-01-02 NOTE — PROGRESS NOTES
Internal Medicine Progress Note     [facility]     Jamil Stein D.O., ALEXANDERIJen Robles D.O., RAMESHOJAGRUTI.  Jose Fishman D.O.     Judie Zaragoaz, MSN, APRN, NP-C  Elgin Dorantes, MSN, APRN-CNP  Chong Vargas, MSN, APRN, NP-C  Claudia Rodriguez, MSN, APRN-CNP  Hope Tabares, MSN, APRN, NP-C     Primary Care Physician: Awadalla, Bahaa A, MD   Admitting Physician:  Ryan Robles DO  Admission date and time: 12/31/2024  9:51 PM    Room:  [address] [place] [address]  Admitting diagnosis: Generalized weakness [R53.1]  Hematoma of scalp, initial encounter [S00.03XA]  Injury of head, initial encounter [S09.90XA]  Laceration of scalp, initial encounter [S01.01XA]  Ambulatory dysfunction [R26.2]    Patient Name: Tere Lewis  MRN: [id number]    Date of Service: 1/2/2025     Subjective:  Tere is a 74 y.o. female who was seen and examined today,1/2/2025, at the bedside.  Patient complaining that the dressing on her scalp is too tight.  This was redressed by Chong Vargas nurse practitioner.  Patient doing well but anxious to go home.  Will encourage ambulation with possible discharge tomorrow.  Patient denies significant headaches.  Denies dizziness or other neurological symptoms      No family present during my examination.    Review of systems:  Constitutional:   - fatigue and malaise , - fever/chills  HEENT:   Denies ear pain, sore throat, sinus or eye problems.  Cardiovascular:   - chest pain, - palpitations, - history of irregular heart beats/arrhythmia, - chronically anticoagulated  Respiratory:   - dyspnea at rest, - dyspnea on exertion, - coughing, - sputum, - hemoptysis  Gastrointestinal:   - nausea, -vomiting. - bowel pattern changes. - hematochezia. - melena. - poor appetite and poor intake. - abdominal pain.  Genitourinary:    Denies any urgency, frequency, hematuria. Voiding  without difficulty.  Extremities:   + Hinged brace to the left lower extremity, chronic bilateral

## 2025-01-02 NOTE — PROGRESS NOTES
OCCUPATIONAL THERAPY INITIAL EVALUATION    [facility]  [address] [place]        Date:2025                                                  Patient Name: Tere Lewis    MRN: [id number]    : 1950    Room: [address] [place] [address]      Evaluating OT: Juana Dominguez OTR/L; [id number]     Referring Provider and Specific Provider Orders/Date:      25  OT eval and treat  Start:  25,   End:  25,   ONE TIME,   Standing Count:  1 Occurrences,   R         Obey, Iskarely U, DO      Placement Recommendation: Subacute        Diagnosis:   1. Injury of head, initial encounter    2. Laceration of scalp, initial encounter    3. Hematoma of scalp, initial encounter    4. Generalized weakness         Surgery: none       Pertinent Medical History:       Past Medical History:   Diagnosis Date    Chronic back pain     Degeneration of lumbar intervertebral disc     Heart murmur     Hypertension     Lymphedema     bilateral legs    Metabolic syndrome     Osteoarthritis     Osteopenia     Pinched nerve     Radiculopathy of lumbar region     Sciatica of left side          Past Surgical History:   Procedure Laterality Date    ABDOMEN SURGERY  1984    UPPER GASTROINTESTINAL ENDOSCOPY N/A 2024    EGD W/EUS FNA performed by Delano Perez MD at [facility] ENDOSCOPY    VEIN SURGERY Bilateral 2016      Precautions:  Fall Risk, left parietal scalp laceration secondary to fall at home with gauze and ace bandage wrapped, L hinged brace, B LE lymphedema, pure wick      Comment: pt states she prefers the hinged brace to L LE be applied in standing as her aides does when she is at home.      Assessment of current deficits:     [x] Functional mobility  [x]ADLs  [x] Strength               []Cognition    [x] Functional transfers   [x] IADLs         [x] Safety Awareness   [x]Endurance    [] Fine Coordination              [x] Balance      [] Vision/perception    side steps towards the head of the bed, verbal cues for walker sequence and safety.   Modified [place]    Balance Sitting:     Static: good     Dynamic: fair   Standing: fair minus with wheeled walker  Sitting:     Static: good     Dynamic: good   Standing: good  with wheeled walker   Activity Tolerance Fair minus  good    Visual/  Perceptual Glasses: yes                 Hand Dominance: right      AROM (PROM) Strength Additional Info:  Goal:   RUE  WFL with exception of limited shoulder flexion to 70* 2/5 proximally  3/5 distally Fair plus  and wfl FMC/dexterity noted during ADL tasks   Improve overall RUE strength  for participation in functional tasks   LUE WFL with exception of limited shoulder flexion to 70* 2/5 proximally  3/5 distally Fair plus  and wfl FMC/dexterity noted during ADL tasks   Improve overall LUE strength  for participation in functional tasks     Hearing: WFL   Sensation:  No c/o numbness or tingling  Tone: WFL   Edema: yes, B LE's     Comments: Upon arrival the patient was supine.  At end of session, patient was supine with call light and phone within reach, all lines and tubes intact.  Overall patient demonstrated decreased independence and safety during completion of ADL/functional transfer/mobility tasks.  Pt would benefit from continued skilled OT to increase safety and independence with completion of ADL/IADL tasks for functional independence and quality of life.    Treatment: OT treatment provided this date includes:   Instruction/training on safety and adapted techniques for completion of ADLs   Instruction/training on safe functional mobility/transfer techniques   Instruction/training on energy conservation/work simplification for completion of ADLs   Instruction/training on proper positioning/alignment to prevent contractures     Rehab Potential: Good for established goals.      Patient / Family Goal: return home       Patient and/or family were instructed on functional

## 2025-01-02 NOTE — PROGRESS NOTES
Physical Therapy Initial Evaluation/Plan of Care    Room #:  0424/0424-01  Patient Name: Tere Lewis  YOB: 1950  MRN: [id number]    Date of Service: 1/2/2025     Tentative placement recommendation: Subacute Rehab  Equipment recommendation: To be determined      Evaluating Physical Therapist: Jenniffer De Dios, PT #[id number]      Specific Provider Orders/Date/Referring Provider :  01/01/25 0400    PT eval and treat  Start:  01/01/25 0400,   End:  01/01/25 0400,   ONE TIME,   Standing Count:  1 Occurrences,   R       Obey, Ismail U, DO    Admitting Diagnosis:   Generalized weakness [R53.1]  Hematoma of scalp, initial encounter [S00.03XA]  Injury of head, initial encounter [S09.90XA]  Laceration of scalp, initial encounter [S01.01XA]  Ambulatory dysfunction [R26.2]       Patient has issues with her right leg due to edema and an ankle problem from a prior fracture.  She got caught on a piece of carpet, fell backwards, hitting her head on on a corner of a table.    Surgery: none  Visit Diagnoses         Codes    Injury of head, initial encounter    -  Primary S09.90XA    Laceration of scalp, initial encounter     S01.01XA    Hematoma of scalp, initial encounter     S00.03XA    Generalized weakness     R53.1            Patient Active Problem List   Diagnosis    DJD (degenerative joint disease) of knee    Primary osteoarthritis of both knees    DDD (degenerative disc disease), lumbar    Lumbosacral radiculopathy at L4    Lumbar spinal stenosis    Tibial plateau fracture, left, closed, initial encounter    Pancreatic cancer (HCC)    Pancreatic mass    Sciatica of left side    Closed fracture of right ankle    Ambulatory dysfunction        ASSESSMENT of Current Deficits Patient exhibits decreased strength, balance, and endurance impairing functional mobility, transfers, gait , gait distance, and tolerance to activity marked genu varus Left knee with crepitus and increased varus with weight bearing, some  on past medical history/social history and prior level of function, completion of standardized testing/informal observation of tasks, assessment of data, and development of Plan of care and goals.     CPT codes:  Low Complexity PT evaluation (27263)  Therapeutic activities (91658)   10 minutes  1 unit(s)  No treatment    Jenniffer De Dios, PT

## 2025-01-02 NOTE — ACP (ADVANCE CARE PLANNING)
Advance Care Planning   Healthcare Decision Maker:    Primary Decision Maker: Chong Barajas - [facility] - [phone]

## 2025-01-03 VITALS
TEMPERATURE: 98.8 F | DIASTOLIC BLOOD PRESSURE: 65 MMHG | HEART RATE: 79 BPM | SYSTOLIC BLOOD PRESSURE: 152 MMHG | HEIGHT: 61 IN | BODY MASS INDEX: 36.82 KG/M2 | WEIGHT: 195 LBS | RESPIRATION RATE: 20 BRPM | OXYGEN SATURATION: 92 %

## 2025-01-03 LAB
ALBUMIN SERPL-MCNC: 3.4 G/DL (ref 3.5–5.2)
ALP SERPL-CCNC: 82 U/L (ref 35–104)
ALT SERPL-CCNC: 18 U/L (ref 0–32)
ANION GAP SERPL CALCULATED.3IONS-SCNC: 11 MMOL/L (ref 7–16)
AST SERPL-CCNC: 17 U/L (ref 0–31)
BASOPHILS # BLD: 0.07 K/UL (ref 0–0.2)
BASOPHILS NFR BLD: 1 % (ref 0–2)
BILIRUB SERPL-MCNC: 0.3 MG/DL (ref 0–1.2)
BUN SERPL-MCNC: 19 MG/DL (ref 6–23)
CALCIUM SERPL-MCNC: 8.7 MG/DL (ref 8.6–10.2)
CHLORIDE SERPL-SCNC: 105 MMOL/L (ref 98–107)
CO2 SERPL-SCNC: 21 MMOL/L (ref 22–29)
CREAT SERPL-MCNC: 0.7 MG/DL (ref 0.5–1)
EOSINOPHIL # BLD: 0.09 K/UL (ref 0.05–0.5)
EOSINOPHILS RELATIVE PERCENT: 1 % (ref 0–6)
ERYTHROCYTE [DISTWIDTH] IN BLOOD BY AUTOMATED COUNT: 13.8 % (ref 11.5–15)
GFR, ESTIMATED: >90 ML/MIN/1.73M2
GLUCOSE SERPL-MCNC: 132 MG/DL (ref 74–99)
HCT VFR BLD AUTO: 32 % (ref 34–48)
HGB BLD-MCNC: 10.8 G/DL (ref 11.5–15.5)
IMM GRANULOCYTES # BLD AUTO: 0.05 K/UL (ref 0–0.58)
IMM GRANULOCYTES NFR BLD: 1 % (ref 0–5)
LYMPHOCYTES NFR BLD: 2.45 K/UL (ref 1.5–4)
LYMPHOCYTES RELATIVE PERCENT: 28 % (ref 20–42)
MCH RBC QN AUTO: 32 PG (ref 26–35)
MCHC RBC AUTO-ENTMCNC: 33.8 G/DL (ref 32–34.5)
MCV RBC AUTO: 95 FL (ref 80–99.9)
MICROORGANISM SPEC CULT: ABNORMAL
MICROORGANISM SPEC CULT: ABNORMAL
MONOCYTES NFR BLD: 0.83 K/UL (ref 0.1–0.95)
MONOCYTES NFR BLD: 10 % (ref 2–12)
NEUTROPHILS NFR BLD: 60 % (ref 43–80)
NEUTS SEG NFR BLD: 5.29 K/UL (ref 1.8–7.3)
PLATELET # BLD AUTO: 201 K/UL (ref 130–450)
PMV BLD AUTO: 9.3 FL (ref 7–12)
POTASSIUM SERPL-SCNC: 4.2 MMOL/L (ref 3.5–5)
PROT SERPL-MCNC: 6.1 G/DL (ref 6.4–8.3)
RBC # BLD AUTO: 3.37 M/UL (ref 3.5–5.5)
SODIUM SERPL-SCNC: 137 MMOL/L (ref 132–146)
SPECIMEN DESCRIPTION: ABNORMAL
WBC OTHER # BLD: 8.8 K/UL (ref 4.5–11.5)

## 2025-01-03 PROCEDURE — 2500000003 HC RX 250 WO HCPCS: Performed by: INTERNAL MEDICINE

## 2025-01-03 PROCEDURE — 6370000000 HC RX 637 (ALT 250 FOR IP): Performed by: NURSE PRACTITIONER

## 2025-01-03 PROCEDURE — 36415 COLL VENOUS BLD VENIPUNCTURE: CPT

## 2025-01-03 PROCEDURE — 80053 COMPREHEN METABOLIC PANEL: CPT

## 2025-01-03 PROCEDURE — 85025 COMPLETE CBC W/AUTO DIFF WBC: CPT

## 2025-01-03 RX ORDER — CEFDINIR 300 MG/1
300 CAPSULE ORAL 2 TIMES DAILY
Qty: 10 CAPSULE | Refills: 0 | Status: SHIPPED | OUTPATIENT
Start: 2025-01-03 | End: 2025-01-08

## 2025-01-03 RX ADMIN — LISINOPRIL 20 MG: 20 TABLET ORAL at 10:27

## 2025-01-03 RX ADMIN — SODIUM CHLORIDE, PRESERVATIVE FREE 10 ML: 5 INJECTION INTRAVENOUS at 10:26

## 2025-01-03 ASSESSMENT — PAIN SCALES - GENERAL: PAINLEVEL_OUTOF10: 0

## 2025-01-03 NOTE — PROGRESS NOTES
CLINICAL PHARMACY NOTE: MEDS TO BEDS    Total # of Prescriptions Filled: 1   The following medications were delivered to the patient:  Cefdinir 300 mg    Additional Documentation:

## 2025-01-03 NOTE — CARE COORDINATION
DC order noted.  MICHELLE met with patient in her room, she confirmed plan remains home and states her aide, Barbara will pick her up today around 1p.  Did again offer MYAH but she declines.  MICHELLE arranged for [facility] to do Home PT, spoke with Chayito there and faxed order to them.  They have called and arranged SOC with patient.  Patient has all needed DME, lives in a ranch with ramp to enter, has aide services through [facility].  No other needs noted by patient.

## 2025-01-06 ENCOUNTER — TELEPHONE (OUTPATIENT)
Dept: INTERNAL MEDICINE CLINIC | Age: 75
End: 2025-01-06

## 2025-01-06 NOTE — DISCHARGE SUMMARY
[place] [id number]                            DISCHARGE SUMMARY      PATIENT NAME: GARRY LYNN               : 1950  MED REC NO: [id number]                        ROOM: 0424  ACCOUNT NO: [id number]                       ADMIT DATE: 2024  PROVIDER: Jamil Stein DO      PRIMARY CARE PHYSICIAN:  Dr. Awadalla    ADMITTING DIAGNOSIS:  Poorly controlled essential hypertension.    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    COMPLICATION:  None.    OPERATION:  None.    CONSULTATIONS OBTAINED:  With no one.    ADDITIONAL ADMITTING PHYSICIAN:  Dr. Stein.    CHIEF COMPLAINT AND HISTORY OF CHIEF COMPLAINT:  This is a 74-year-old white female who was admitted to [facility].  The patient presented to the hospital on 2024. The patient presented to the hospital after an apparent fall.  The patient states that she was using her walker to make a turn when she lost balance and fell backward and hit her head.  The patient did develop laceration of scalp.  She presented to the hospital where she was seen and evaluated. The patient at this time had no complaint of lightheadedness or dizziness.  She was admitted to the hospital at this time with elevated blood pressure and scalp laceration.    PAST MEDICAL HISTORY:  Positive for usual childhood diseases.  The patient does have a history of chronic back pain,

## 2025-01-06 NOTE — TELEPHONE ENCOUNTER
Care Transitions Initial Follow Up Call    Outreach made within 2 business days of discharge: Yes    Patient: Tere Lewis Patient : 1950   MRN: [id number]  Reason for Admission: fall   Discharge Date: 1/3/25       Spoke with:     Discharge department/facility: [facility] Interactive Patient Contact:  Was patient able to fill all prescriptions: Yes  Was patient instructed to bring all medications to the follow-up visit: Yes  Is patient taking all medications as directed in the discharge summary? Yes  Does patient understand their discharge instructions: Yes  Does patient have questions or concerns that need addressed prior to 7-14 day follow up office visit: no    Additional needs identified to be addressed with provider  No needs identified             Scheduled appointment with PCP within 7-14 days    Follow Up  Future Appointments   Date Time Provider Department Center   2025  3:30 PM Awadalla, Bahaa A, MD [facility]   2/10/2025 11:50 AM Terry Mckeon DO [facility]       Carmina Aguirre MA

## 2025-01-09 NOTE — PROGRESS NOTES
Physician Progress Note      PATIENT:               GARRY LYNN  [facility] #:                  [id number]  :                       1950  ADMIT DATE:       2024 9:51 PM  DISCH DATE:        1/3/2025 2:20 PM  RESPONDING  PROVIDER #:        Jamil Stein DO          QUERY TEXT:    Pt admitted with fall and weakness. Documentation notes \"she is presently   deviated from her baseline performance status.\"  If possible, please document   in the progress notes and discharge summary if you are evaluating and / or   treating any of the following:    The medical record reflects the following:  Risk Factors: advanced age  Clinical Indicators: ED provider note-  She spoke with me saying that she   feels very weak currently and she is not sure that she be able to get out to   her walker and use the bathroom.  H&P-  74-year-old female presented to the ED following fall.  Patient states   that she was using a walker trying to make a turn when she lost balance and   fell backward and hit her head.  She developed a laceration to her scalp.   However she is unable to walk and is afraid of another fall. Soes not think   she would be able to ambulate properly without high probability of falling.    As such patient will be admitted for PT OT evaluation.   IM PN-   She endorses weakness and deconditioning with difficulty   performing activities of daily living following the mechanical fall.  Treatment: PT/OT, home health    Thank you,  Lyly Summers, MSN, RN  Clinical Documentation Integrity  [phone]  Options provided:  -- Age Related Physical Debility  -- Frailty  -- Weakness due to other, Please document other cause.  -- Other - I will add my own diagnosis  -- Disagree - Not applicable / Not valid  -- Disagree - Clinically unable to determine / Unknown  -- Refer to Clinical Documentation Reviewer    PROVIDER RESPONSE TEXT:    This patient has age related physical debility.    Query created by: Lyly Summers on

## 2025-01-13 DIAGNOSIS — M17.11 PRIMARY OSTEOARTHRITIS OF RIGHT KNEE: Primary | ICD-10-CM

## 2025-01-22 ENCOUNTER — OFFICE VISIT (OUTPATIENT)
Dept: INTERNAL MEDICINE CLINIC | Age: 75
End: 2025-01-22
Payer: MEDICARE

## 2025-01-22 VITALS
HEIGHT: 61 IN | WEIGHT: 195 LBS | BODY MASS INDEX: 36.82 KG/M2 | SYSTOLIC BLOOD PRESSURE: 138 MMHG | RESPIRATION RATE: 18 BRPM | HEART RATE: 73 BPM | DIASTOLIC BLOOD PRESSURE: 70 MMHG | TEMPERATURE: 98 F | OXYGEN SATURATION: 99 %

## 2025-01-22 DIAGNOSIS — I89.0 LYMPHEDEMA: ICD-10-CM

## 2025-01-22 DIAGNOSIS — M85.89 OSTEOPENIA OF MULTIPLE SITES: ICD-10-CM

## 2025-01-22 DIAGNOSIS — D49.0 PANCREATIC NEOPLASM: ICD-10-CM

## 2025-01-22 DIAGNOSIS — I10 PRIMARY HYPERTENSION: Primary | ICD-10-CM

## 2025-01-22 DIAGNOSIS — S82.891S CLOSED FRACTURE OF RIGHT ANKLE, SEQUELA: ICD-10-CM

## 2025-01-22 PROCEDURE — 1036F TOBACCO NON-USER: CPT | Performed by: INTERNAL MEDICINE

## 2025-01-22 PROCEDURE — 3078F DIAST BP <80 MM HG: CPT | Performed by: INTERNAL MEDICINE

## 2025-01-22 PROCEDURE — G8400 PT W/DXA NO RESULTS DOC: HCPCS | Performed by: INTERNAL MEDICINE

## 2025-01-22 PROCEDURE — 1159F MED LIST DOCD IN RCRD: CPT | Performed by: INTERNAL MEDICINE

## 2025-01-22 PROCEDURE — 1160F RVW MEDS BY RX/DR IN RCRD: CPT | Performed by: INTERNAL MEDICINE

## 2025-01-22 PROCEDURE — 1090F PRES/ABSN URINE INCON ASSESS: CPT | Performed by: INTERNAL MEDICINE

## 2025-01-22 PROCEDURE — 1123F ACP DISCUSS/DSCN MKR DOCD: CPT | Performed by: INTERNAL MEDICINE

## 2025-01-22 PROCEDURE — G8417 CALC BMI ABV UP PARAM F/U: HCPCS | Performed by: INTERNAL MEDICINE

## 2025-01-22 PROCEDURE — 3075F SYST BP GE 130 - 139MM HG: CPT | Performed by: INTERNAL MEDICINE

## 2025-01-22 PROCEDURE — 99214 OFFICE O/P EST MOD 30 MIN: CPT | Performed by: INTERNAL MEDICINE

## 2025-01-22 PROCEDURE — G8427 DOCREV CUR MEDS BY ELIG CLIN: HCPCS | Performed by: INTERNAL MEDICINE

## 2025-01-22 PROCEDURE — 1111F DSCHRG MED/CURRENT MED MERGE: CPT | Performed by: INTERNAL MEDICINE

## 2025-01-22 ASSESSMENT — ENCOUNTER SYMPTOMS
PHOTOPHOBIA: 0
EYE PAIN: 0
SORE THROAT: 0
BLOOD IN STOOL: 0
TROUBLE SWALLOWING: 0
CONSTIPATION: 0
BACK PAIN: 0
COUGH: 0
VOICE CHANGE: 0
SHORTNESS OF BREATH: 0
NAUSEA: 0
DIARRHEA: 0
VOMITING: 0
ABDOMINAL PAIN: 0
RHINORRHEA: 0
WHEEZING: 0
CHEST TIGHTNESS: 0

## 2025-01-22 ASSESSMENT — PATIENT HEALTH QUESTIONNAIRE - PHQ9
SUM OF ALL RESPONSES TO PHQ9 QUESTIONS 1 & 2: 0
SUM OF ALL RESPONSES TO PHQ QUESTIONS 1-9: 0
SUM OF ALL RESPONSES TO PHQ QUESTIONS 1-9: 0
2. FEELING DOWN, DEPRESSED OR HOPELESS: NOT AT ALL
SUM OF ALL RESPONSES TO PHQ QUESTIONS 1-9: 0
1. LITTLE INTEREST OR PLEASURE IN DOING THINGS: NOT AT ALL
SUM OF ALL RESPONSES TO PHQ QUESTIONS 1-9: 0

## 2025-01-22 NOTE — PROGRESS NOTES
Tere Lewis (:  1950) is a 74 y.o. female,Established patient, here for evaluation of the following chief complaint(s):  Suture / Staple Removal (Staple removal requested from Scalp / 24  scalp laceration )        Subjective   SUBJECTIVE/OBJECTIVE:  HPI  Patient is here for follow-up today.  Last seen 2022 when she was admitted to [facility] with left tibial plateau fractures.  She follows up regularly with her orthopedic surgeon Dr. Mckeon.  She does have a brace on her left lower extremity.  She gets knee injections every few weeks.  Apparently 2024 she fell and broke her ankle.  Patient was admitted at [facility].  Her workup at the time revealed pancreatic mass of the head of the pancreas.  Patient had biopsy done which showed 5 cm serous cystic neoplasm.  Patient was evaluated by hepatobiliary surgeon Dr. Prosper Lindsay and she was advised that this is generally benign and does not need surgical intervention unless they get large enough to cause symptoms.  She was advised to follow-up in 1 year with MRI.  She is currently not having any symptoms.  Apparently again in 2024 she fell at home after tripping and hit her head.  She had a laceration of the scalp.  She was admitted for a couple of days for observation but there was no repercussions to that event other than the laceration.  She is currently at home with having home health care and caregiver.    Review of Systems   Constitutional:  Negative for chills, fatigue, fever and unexpected weight change.   HENT:  Negative for congestion, postnasal drip, rhinorrhea, sore throat, trouble swallowing and voice change.    Eyes:  Negative for photophobia, pain and visual disturbance.   Respiratory:  Negative for cough, chest tightness, shortness of breath and wheezing.    Cardiovascular:  Negative for chest pain and palpitations.   Gastrointestinal:  Negative for abdominal pain, blood in stool,

## 2025-01-23 ENCOUNTER — TELEPHONE (OUTPATIENT)
Dept: HEMATOLOGY | Age: 75
End: 2025-01-23

## 2025-01-23 NOTE — TELEPHONE ENCOUNTER
I spoke with the patient this afternoon regarding the referral from Dr Awadallah. She stated that at this time she is still having issues with mobility and transportation. She would like our office to reach back out to her in 07/2025 when the weather is warmer.   Electronically signed by Jessica Osei MA on 1/23/2025 at 12:52 PM

## 2025-02-03 DIAGNOSIS — M17.12 PRIMARY OSTEOARTHRITIS OF LEFT KNEE: ICD-10-CM

## 2025-02-03 DIAGNOSIS — M17.11 PRIMARY OSTEOARTHRITIS OF RIGHT KNEE: Primary | ICD-10-CM

## 2025-02-10 ENCOUNTER — OFFICE VISIT (OUTPATIENT)
Dept: ORTHOPEDIC SURGERY | Age: 75
End: 2025-02-10
Payer: MEDICARE

## 2025-02-10 VITALS — BODY MASS INDEX: 36.82 KG/M2 | HEIGHT: 61 IN | WEIGHT: 195 LBS

## 2025-02-10 DIAGNOSIS — M17.12 PRIMARY OSTEOARTHRITIS OF LEFT KNEE: ICD-10-CM

## 2025-02-10 DIAGNOSIS — M17.11 PRIMARY OSTEOARTHRITIS OF RIGHT KNEE: Primary | ICD-10-CM

## 2025-02-10 PROCEDURE — 1036F TOBACCO NON-USER: CPT | Performed by: ORTHOPAEDIC SURGERY

## 2025-02-10 PROCEDURE — 1123F ACP DISCUSS/DSCN MKR DOCD: CPT | Performed by: ORTHOPAEDIC SURGERY

## 2025-02-10 PROCEDURE — 1159F MED LIST DOCD IN RCRD: CPT | Performed by: ORTHOPAEDIC SURGERY

## 2025-02-10 PROCEDURE — 20610 DRAIN/INJ JOINT/BURSA W/O US: CPT | Performed by: ORTHOPAEDIC SURGERY

## 2025-02-10 PROCEDURE — 1090F PRES/ABSN URINE INCON ASSESS: CPT | Performed by: ORTHOPAEDIC SURGERY

## 2025-02-10 PROCEDURE — G8417 CALC BMI ABV UP PARAM F/U: HCPCS | Performed by: ORTHOPAEDIC SURGERY

## 2025-02-10 PROCEDURE — 99213 OFFICE O/P EST LOW 20 MIN: CPT | Performed by: ORTHOPAEDIC SURGERY

## 2025-02-10 PROCEDURE — 1125F AMNT PAIN NOTED PAIN PRSNT: CPT | Performed by: ORTHOPAEDIC SURGERY

## 2025-02-10 PROCEDURE — G8400 PT W/DXA NO RESULTS DOC: HCPCS | Performed by: ORTHOPAEDIC SURGERY

## 2025-02-10 PROCEDURE — G8427 DOCREV CUR MEDS BY ELIG CLIN: HCPCS | Performed by: ORTHOPAEDIC SURGERY

## 2025-02-10 NOTE — PROGRESS NOTES
Chief Complaint   Patient presents with    Knee Pain     Bilateral Knee F/U       Tere Lewis returns today for follow-up of her bilateral knee pain. she reports this is worse than when I saw her last.  Previous treatment measures have been successful.    Past Medical History:   Diagnosis Date    Chronic back pain     Degeneration of lumbar intervertebral disc     Heart murmur     Hypertension     Lymphedema     bilateral legs    Metabolic syndrome     Osteoarthritis     Osteopenia     Pinched nerve     Radiculopathy of lumbar region     Sciatica of left side      Past Surgical History:   Procedure Laterality Date    ABDOMEN SURGERY  1984    UPPER GASTROINTESTINAL ENDOSCOPY N/A 01/05/2024    EGD W/EUS FNA performed by Delano Perez MD at [facility] ENDOSCOPY    VEIN SURGERY Bilateral 2016       Current Outpatient Medications:     lisinopril (PRINIVIL;ZESTRIL) 20 MG tablet, Take 1 tablet by mouth 2 times daily, Disp: 180 tablet, Rfl: 1    vitamin C (ASCORBIC ACID) 500 MG tablet, Take 1 tablet by mouth daily, Disp: , Rfl:     b complex vitamins capsule, Take 1 capsule by mouth Daily with supper, Disp: , Rfl:     vitamin B-12 (CYANOCOBALAMIN) 1000 MCG tablet, Take 1 tablet by mouth daily, Disp: , Rfl:     calcium carbonate 1500 (600 Ca) MG TABS tablet, Take 1 tablet by mouth daily, Disp: , Rfl:     Omega-3 Fatty Acids (FISH OIL) 1000 MG capsule, Take 2 capsules by mouth 2 times daily, Disp: , Rfl:     Cholecalciferol (VITAMIN D) 50 MCG (2000 UT) CAPS capsule, Take 1 capsule by mouth in the morning and at bedtime, Disp: , Rfl:     Garlic 1500 MG CAPS, Take 3,000 mg by mouth in the morning and at bedtime, Disp: , Rfl:     cod liver oil CAPS, Take 2 capsules by mouth in the morning and at bedtime, Disp: , Rfl:     Multiple Vitamins-Minerals (CENTRUM PO), Take 1 tablet by mouth daily, Disp: , Rfl:   Allergies   Allergen Reactions    Seasonal Other (See Comments)     Sneezing, watery eyes.     Social History

## 2025-02-12 RX ORDER — TRIAMCINOLONE ACETONIDE 40 MG/ML
40 INJECTION, SUSPENSION INTRA-ARTICULAR; INTRAMUSCULAR ONCE
Status: COMPLETED | OUTPATIENT
Start: 2025-02-12 | End: 2025-02-12

## 2025-02-12 RX ADMIN — TRIAMCINOLONE ACETONIDE 40 MG: 40 INJECTION, SUSPENSION INTRA-ARTICULAR; INTRAMUSCULAR at 10:26

## 2025-02-12 RX ADMIN — TRIAMCINOLONE ACETONIDE 40 MG: 40 INJECTION, SUSPENSION INTRA-ARTICULAR; INTRAMUSCULAR at 10:25

## 2025-02-24 DIAGNOSIS — I10 PRIMARY HYPERTENSION: ICD-10-CM

## 2025-02-24 DIAGNOSIS — I89.0 LYMPHEDEMA: ICD-10-CM

## 2025-02-24 LAB
BASOPHILS ABSOLUTE: 0.06 K/UL (ref 0–0.2)
BASOPHILS RELATIVE PERCENT: 1 % (ref 0–2)
EOSINOPHILS ABSOLUTE: 0.17 K/UL (ref 0.05–0.5)
EOSINOPHILS RELATIVE PERCENT: 2 % (ref 0–6)
HCT VFR BLD CALC: 41.1 % (ref 34–48)
HEMOGLOBIN: 13.2 G/DL (ref 11.5–15.5)
IMMATURE GRANULOCYTES %: 0 % (ref 0–5)
IMMATURE GRANULOCYTES ABSOLUTE: <0.03 K/UL (ref 0–0.58)
LYMPHOCYTES ABSOLUTE: 2.38 K/UL (ref 1.5–4)
LYMPHOCYTES RELATIVE PERCENT: 30 % (ref 20–42)
MCH RBC QN AUTO: 31.5 PG (ref 26–35)
MCHC RBC AUTO-ENTMCNC: 32.1 G/DL (ref 32–34.5)
MCV RBC AUTO: 98.1 FL (ref 80–99.9)
MONOCYTES ABSOLUTE: 0.6 K/UL (ref 0.1–0.95)
MONOCYTES RELATIVE PERCENT: 8 % (ref 2–12)
NEUTROPHILS ABSOLUTE: 4.76 K/UL (ref 1.8–7.3)
NEUTROPHILS RELATIVE PERCENT: 60 % (ref 43–80)
PDW BLD-RTO: 13.2 % (ref 11.5–15)
PLATELET # BLD: 229 K/UL (ref 130–450)
PMV BLD AUTO: 9.9 FL (ref 7–12)
RBC # BLD: 4.19 M/UL (ref 3.5–5.5)
WBC # BLD: 8 K/UL (ref 4.5–11.5)

## 2025-02-25 LAB
ALBUMIN: 4.3 G/DL (ref 3.5–5.2)
ALP BLD-CCNC: 86 U/L (ref 35–104)
ALT SERPL-CCNC: 20 U/L (ref 0–32)
ANION GAP SERPL CALCULATED.3IONS-SCNC: 17 MMOL/L (ref 7–16)
AST SERPL-CCNC: 28 U/L (ref 0–31)
BILIRUB SERPL-MCNC: 0.4 MG/DL (ref 0–1.2)
BUN BLDV-MCNC: 29 MG/DL (ref 6–23)
CALCIUM SERPL-MCNC: 9.8 MG/DL (ref 8.6–10.2)
CHLORIDE BLD-SCNC: 102 MMOL/L (ref 98–107)
CHOLESTEROL, FASTING: 203 MG/DL
CO2: 18 MMOL/L (ref 22–29)
CREAT SERPL-MCNC: 0.8 MG/DL (ref 0.5–1)
GFR, ESTIMATED: 77 ML/MIN/1.73M2
GLUCOSE BLD-MCNC: 129 MG/DL (ref 74–99)
HDLC SERPL-MCNC: 50 MG/DL
LDL CHOLESTEROL: 110 MG/DL
POTASSIUM SERPL-SCNC: 4.4 MMOL/L (ref 3.5–5)
SODIUM BLD-SCNC: 137 MMOL/L (ref 132–146)
T4 FREE: 1.3 NG/DL (ref 0.9–1.7)
TOTAL PROTEIN: 7.6 G/DL (ref 6.4–8.3)
TRIGLYCERIDE, FASTING: 215 MG/DL
TSH SERPL DL<=0.05 MIU/L-ACNC: 1.82 UIU/ML (ref 0.27–4.2)
VLDLC SERPL CALC-MCNC: 43 MG/DL

## 2025-03-05 ENCOUNTER — OFFICE VISIT (OUTPATIENT)
Dept: INTERNAL MEDICINE CLINIC | Age: 75
End: 2025-03-05
Payer: MEDICARE

## 2025-03-05 VITALS
BODY MASS INDEX: 36.82 KG/M2 | TEMPERATURE: 97.3 F | HEART RATE: 77 BPM | SYSTOLIC BLOOD PRESSURE: 132 MMHG | OXYGEN SATURATION: 100 % | HEIGHT: 61 IN | WEIGHT: 195 LBS | DIASTOLIC BLOOD PRESSURE: 80 MMHG

## 2025-03-05 DIAGNOSIS — R73.01 IMPAIRED FASTING GLUCOSE: ICD-10-CM

## 2025-03-05 DIAGNOSIS — M17.0 PRIMARY OSTEOARTHRITIS OF BOTH KNEES: ICD-10-CM

## 2025-03-05 DIAGNOSIS — D49.0 PANCREATIC NEOPLASM: ICD-10-CM

## 2025-03-05 DIAGNOSIS — I89.0 LYMPHEDEMA: ICD-10-CM

## 2025-03-05 DIAGNOSIS — M85.89 OSTEOPENIA OF MULTIPLE SITES: Primary | ICD-10-CM

## 2025-03-05 DIAGNOSIS — I10 PRIMARY HYPERTENSION: ICD-10-CM

## 2025-03-05 DIAGNOSIS — E78.49 OTHER HYPERLIPIDEMIA: ICD-10-CM

## 2025-03-05 PROBLEM — C25.9 PANCREATIC CANCER (HCC): Status: RESOLVED | Noted: 2024-01-02 | Resolved: 2025-03-05

## 2025-03-05 PROCEDURE — 3079F DIAST BP 80-89 MM HG: CPT | Performed by: INTERNAL MEDICINE

## 2025-03-05 PROCEDURE — G8400 PT W/DXA NO RESULTS DOC: HCPCS | Performed by: INTERNAL MEDICINE

## 2025-03-05 PROCEDURE — 3075F SYST BP GE 130 - 139MM HG: CPT | Performed by: INTERNAL MEDICINE

## 2025-03-05 PROCEDURE — G8427 DOCREV CUR MEDS BY ELIG CLIN: HCPCS | Performed by: INTERNAL MEDICINE

## 2025-03-05 PROCEDURE — 99214 OFFICE O/P EST MOD 30 MIN: CPT | Performed by: INTERNAL MEDICINE

## 2025-03-05 PROCEDURE — G8417 CALC BMI ABV UP PARAM F/U: HCPCS | Performed by: INTERNAL MEDICINE

## 2025-03-05 PROCEDURE — 1090F PRES/ABSN URINE INCON ASSESS: CPT | Performed by: INTERNAL MEDICINE

## 2025-03-05 PROCEDURE — 1160F RVW MEDS BY RX/DR IN RCRD: CPT | Performed by: INTERNAL MEDICINE

## 2025-03-05 PROCEDURE — 1036F TOBACCO NON-USER: CPT | Performed by: INTERNAL MEDICINE

## 2025-03-05 PROCEDURE — 1159F MED LIST DOCD IN RCRD: CPT | Performed by: INTERNAL MEDICINE

## 2025-03-05 PROCEDURE — 1123F ACP DISCUSS/DSCN MKR DOCD: CPT | Performed by: INTERNAL MEDICINE

## 2025-03-05 ASSESSMENT — ENCOUNTER SYMPTOMS
WHEEZING: 0
CHEST TIGHTNESS: 0
CONSTIPATION: 0
ABDOMINAL PAIN: 0
SORE THROAT: 0
COUGH: 0
NAUSEA: 0
VOICE CHANGE: 0
RHINORRHEA: 0
EYE PAIN: 0
BACK PAIN: 0
SHORTNESS OF BREATH: 0
VOMITING: 0
TROUBLE SWALLOWING: 0
DIARRHEA: 0
BLOOD IN STOOL: 0
PHOTOPHOBIA: 0

## 2025-03-05 NOTE — PROGRESS NOTES
of infection on exam.  5. Other hyperlipidemia  Patient advised to low-fat diet.  Information given.  Recheck fasting lipid profile with next visit  -     Lipid, Fasting; Future  -     Comprehensive Metabolic Panel; Future  -     CBC with Auto Differential; Future  6. Impaired fasting glucose  Fasting glucose of 129.  Patient advised to avoid carbs and sweets and desserts.  Recheck hemoglobin A1c with next visit  -     Hemoglobin A1C; Future  -     Comprehensive Metabolic Panel; Future  -     CBC with Auto Differential; Future  7. Primary osteoarthritis of both knees  Patient follows up with her orthopedic surgeon Dr. Mckeon on a regular basis.    She still adamantly declines colonoscopies, screening for colon cancer or mammography.      Return in about 4 months (around 7/5/2025).     An electronic signature was used to authenticate this note.    --Bahaa A Awadalla, MD

## 2025-03-09 ENCOUNTER — HOSPITAL ENCOUNTER (EMERGENCY)
Age: 75
Discharge: HOME | End: 2025-03-09
Attending: STUDENT IN AN ORGANIZED HEALTH CARE EDUCATION/TRAINING PROGRAM
Payer: MEDICARE

## 2025-03-09 ENCOUNTER — APPOINTMENT (OUTPATIENT)
Dept: GENERAL RADIOLOGY | Age: 75
End: 2025-03-09
Payer: MEDICARE

## 2025-03-09 VITALS
SYSTOLIC BLOOD PRESSURE: 138 MMHG | OXYGEN SATURATION: 99 % | HEART RATE: 83 BPM | DIASTOLIC BLOOD PRESSURE: 77 MMHG | BODY MASS INDEX: 36.84 KG/M2 | WEIGHT: 195 LBS | RESPIRATION RATE: 20 BRPM | TEMPERATURE: 98.2 F
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
## 2025-03-09 PROCEDURE — 99283 EMERGENCY DEPT VISIT LOW MDM: CPT

## 2025-03-09 PROCEDURE — 73560 X-RAY EXAM OF KNEE 1 OR 2: CPT

## 2025-03-09 PROCEDURE — 73502 X-RAY EXAM HIP UNI 2-3 VIEWS: CPT

## 2025-03-09 PROCEDURE — 73590 X-RAY EXAM OF LOWER LEG: CPT

## 2025-03-09 PROCEDURE — 73610 X-RAY EXAM OF ANKLE: CPT

## 2025-03-09 PROCEDURE — 73030 X-RAY EXAM OF SHOULDER: CPT

## 2025-03-09 PROCEDURE — 73070 X-RAY EXAM OF ELBOW: CPT

## 2025-03-09 PROCEDURE — 73090 X-RAY EXAM OF FOREARM: CPT

## 2025-03-09 PROCEDURE — 73552 X-RAY EXAM OF FEMUR 2/>: CPT

## 2025-03-09 NOTE — DISCHARGE INSTRUCTIONS
XR SHOULDER RIGHT (MIN 2 VIEWS)   Final Result   1.  Right shoulder advanced arthropathy which is unchanged from prior exams.      2.  No fracture or acute disease identified.         XR HIP 2-3 VW W PELVIS RIGHT   Final Result   1. No acute fracture or dislocation of the right hip or right femur.   2. Advanced severe DJD of the right knee.         XR TIBIA FIBULA RIGHT (2 VIEWS)   Final Result   1.  Severe osteoarthritis of the right knee without significant change.  No   acute fracture.      2.  Remote, healed fractures of the right ankle.  No acute leg fracture.      3.  Right leg generalized soft tissue swelling, unchanged.         XR ANKLE RIGHT (MIN 3 VIEWS)   Final Result   1.  Remote, healed fractures of the right ankle.  No acute fracture or   dislocation identified.      2.  Right leg generalized soft tissue swelling, similar to prior, and   nonspecific.         XR RADIUS ULNA RIGHT (2 VIEWS)   Final Result   1.  No fracture or dislocation of the right elbow and right forearm.      2.  Tiny hypertrophic spur of the olecranon process, right elbow.  Otherwise   negative exam.         XR ELBOW RIGHT (2 VIEWS)   Final Result   1.  No fracture or dislocation of the right elbow and right forearm.      2.  Tiny hypertrophic spur of the olecranon process, right elbow.  Otherwise   negative exam.         XR FEMUR RIGHT (MIN 2 VIEWS)   Final Result   1. No acute fracture or dislocation of the right hip or right femur.   2. Advanced severe DJD of the right knee.         XR KNEE RIGHT (1-2 VIEWS)   Final Result   1.  Severe osteoarthritis of the right knee without significant change.  No   acute fracture.      2.  Remote, healed fractures of the right ankle.  No acute leg fracture.      3.  Right leg generalized soft tissue swelling, unchanged.

## 2025-03-10 NOTE — ED PROVIDER NOTES
Department of Emergency Medicine   ED Provider Note  Admit Date/RoomTime: 3/9/2025  9:26 AM  ED Room: WOODY/WOODY          History of Present Illness:  3/10/25, Time: 9:02 AM EDT      Patient is a 74 y.o. female presents with a chief complaint of fall  This has been occurring for this morning.  Patient states that it gets better with nothing.  Patient states that it gets worse with nothing.  Patient states that it is severe in severity.  Patient states it was acute in onset. She reports she chronically has issues with her right foot as she has a flat foot and it sometimes causes her to fall. Today, she was trying to turn a corner and her right foot again was causing her trouble and caused her to fall onto her right side, striking right hip and right shoulder. She is having pain in right arm and right leg. No head strike, HA, neck pain, CP, abd pain or any other complaints.     Review of Systems:     Pertinent positives and negatives are stated within HPI.      --------------------------------------------- PAST HISTORY ---------------------------------------------  Past Medical History:  has a past medical history of Chronic back pain, Degeneration of lumbar intervertebral disc, Heart murmur, Hypertension, Lymphedema, Metabolic syndrome, Osteoarthritis, Osteopenia, Other hyperlipidemia, Pancreatic neoplasm, Pinched nerve, Radiculopathy of lumbar region, and Sciatica of left side.    Past Surgical History:  has a past surgical history that includes Abdomen surgery (1984); Vein Surgery (Bilateral, 2016); and Upper gastrointestinal endoscopy (N/A, 01/05/2024).    Social History:  reports that she quit smoking about 55 years ago. Her smoking use included cigarettes. She has never been exposed to tobacco smoke. She has never used smokeless tobacco. She reports that she does not currently use alcohol. She reports that she does not use drugs.    Family History: family history includes Abdominal aortic aneurysm in her father;

## 2025-03-19 ENCOUNTER — TELEPHONE (OUTPATIENT)
Dept: INTERNAL MEDICINE CLINIC | Age: 75
End: 2025-03-19

## 2025-03-19 DIAGNOSIS — K86.89 PANCREATIC MASS: ICD-10-CM

## 2025-03-19 DIAGNOSIS — D49.0 PANCREATIC NEOPLASM: Primary | ICD-10-CM

## 2025-03-19 NOTE — TELEPHONE ENCOUNTER
Pt asking to for referral to palitive care asking to go to [facility] will fax to [phone]

## 2025-03-31 ENCOUNTER — OFFICE VISIT (OUTPATIENT)
Dept: ORTHOPEDIC SURGERY | Age: 75
End: 2025-03-31
Payer: MEDICARE

## 2025-03-31 DIAGNOSIS — M17.12 PRIMARY OSTEOARTHRITIS OF LEFT KNEE: ICD-10-CM

## 2025-03-31 DIAGNOSIS — M17.11 PRIMARY OSTEOARTHRITIS OF RIGHT KNEE: Primary | ICD-10-CM

## 2025-03-31 PROCEDURE — 20610 DRAIN/INJ JOINT/BURSA W/O US: CPT | Performed by: ORTHOPAEDIC SURGERY

## 2025-03-31 NOTE — PROGRESS NOTES
Chief Complaint   Patient presents with    Knee Pain     B/l knee Zilretta        I will proceed with a cortisone injection in the Bilateral knee.  Verbal and written consent was obtained for the injections. The skin was prepped with alcohol.  A prepared mixture of 32 mg of Zilretta and 5mL diluent was injected to Bilateral knee. The injection was given through the lateral side of the knee. The patient tolerated the injection well. I will see the patient back prn.    Tere was seen today for knee pain.    Diagnoses and all orders for this visit:    Primary osteoarthritis of right knee  -     triamcinolone acetonide (ZILRETTA) intra-articular injection 32 mg    Primary osteoarthritis of left knee  -     triamcinolone acetonide (ZILRETTA) intra-articular injection 32 mg

## 2025-05-02 RX ORDER — LISINOPRIL 20 MG/1
20 TABLET ORAL 2 TIMES DAILY
Qty: 180 TABLET | Refills: 1 | Status: SHIPPED | OUTPATIENT
Start: 2025-05-02

## 2025-05-02 NOTE — TELEPHONE ENCOUNTER
Requested Prescriptions     Pending Prescriptions Disp Refills    lisinopril (PRINIVIL;ZESTRIL) 20 MG tablet 180 tablet 1     Sig: Take 1 tablet by mouth 2 times daily     Last Appointment:  3/5/2025  Future Appointments   Date Time Provider Department Center   5/12/2025 11:00 AM Terry Mckeon DO [facility]   7/7/2025 11:30 AM Terry Mckeon DO [facility]   7/21/2025 10:00 AM Awadalla, Bahaa A, MD [facility]

## 2025-05-05 RX ORDER — LISINOPRIL 20 MG/1
20 TABLET ORAL 2 TIMES DAILY
Qty: 180 TABLET | Refills: 1 | Status: SHIPPED | OUTPATIENT
Start: 2025-05-05

## 2025-05-05 RX ORDER — LISINOPRIL 20 MG/1
20 TABLET ORAL 2 TIMES DAILY
Qty: 180 TABLET | Refills: 1 | OUTPATIENT
Start: 2025-05-05

## 2025-05-12 ENCOUNTER — OFFICE VISIT (OUTPATIENT)
Dept: ORTHOPEDIC SURGERY | Age: 75
End: 2025-05-12
Payer: MEDICARE

## 2025-05-12 VITALS — HEIGHT: 61 IN | BODY MASS INDEX: 36.82 KG/M2 | WEIGHT: 195 LBS

## 2025-05-12 DIAGNOSIS — M17.12 PRIMARY OSTEOARTHRITIS OF LEFT KNEE: ICD-10-CM

## 2025-05-12 DIAGNOSIS — M17.11 PRIMARY OSTEOARTHRITIS OF RIGHT KNEE: Primary | ICD-10-CM

## 2025-05-12 PROCEDURE — G8417 CALC BMI ABV UP PARAM F/U: HCPCS | Performed by: ORTHOPAEDIC SURGERY

## 2025-05-12 PROCEDURE — G8427 DOCREV CUR MEDS BY ELIG CLIN: HCPCS | Performed by: ORTHOPAEDIC SURGERY

## 2025-05-12 PROCEDURE — 1159F MED LIST DOCD IN RCRD: CPT | Performed by: ORTHOPAEDIC SURGERY

## 2025-05-12 PROCEDURE — G8400 PT W/DXA NO RESULTS DOC: HCPCS | Performed by: ORTHOPAEDIC SURGERY

## 2025-05-12 PROCEDURE — 99213 OFFICE O/P EST LOW 20 MIN: CPT | Performed by: ORTHOPAEDIC SURGERY

## 2025-05-12 PROCEDURE — 20610 DRAIN/INJ JOINT/BURSA W/O US: CPT | Performed by: ORTHOPAEDIC SURGERY

## 2025-05-12 PROCEDURE — 1123F ACP DISCUSS/DSCN MKR DOCD: CPT | Performed by: ORTHOPAEDIC SURGERY

## 2025-05-12 PROCEDURE — 1125F AMNT PAIN NOTED PAIN PRSNT: CPT | Performed by: ORTHOPAEDIC SURGERY

## 2025-05-12 PROCEDURE — 1090F PRES/ABSN URINE INCON ASSESS: CPT | Performed by: ORTHOPAEDIC SURGERY

## 2025-05-12 PROCEDURE — 1036F TOBACCO NON-USER: CPT | Performed by: ORTHOPAEDIC SURGERY

## 2025-05-12 RX ORDER — TRIAMCINOLONE ACETONIDE 40 MG/ML
40 INJECTION, SUSPENSION INTRA-ARTICULAR; INTRAMUSCULAR ONCE
Status: COMPLETED | OUTPATIENT
Start: 2025-05-12 | End: 2025-05-12

## 2025-05-12 RX ADMIN — TRIAMCINOLONE ACETONIDE 40 MG: 40 INJECTION, SUSPENSION INTRA-ARTICULAR; INTRAMUSCULAR at 11:42

## 2025-05-12 NOTE — PROGRESS NOTES
Chief Complaint   Patient presents with    Knee Pain     Patient is following up for both knees today. Pain is described as tightness in her left knee. She reports good rom.        Tere Lewis returns today for follow-up of her bilateral knee pain. she reports this is worse than when I saw her last.  Previous treatment measures have been successful.    Past Medical History:   Diagnosis Date    Chronic back pain     Degeneration of lumbar intervertebral disc     Heart murmur     Hypertension     Lymphedema     bilateral legs    Metabolic syndrome     Osteoarthritis     Osteopenia     Other hyperlipidemia 03/05/2025    Pancreatic neoplasm 07/2024    5 cm serous cystic neoplasm of the head of the pancreas status post biopsy July 2024    Pinched nerve     Radiculopathy of lumbar region     Sciatica of left side      Past Surgical History:   Procedure Laterality Date    ABDOMEN SURGERY  1984    UPPER GASTROINTESTINAL ENDOSCOPY N/A 01/05/2024    EGD W/EUS FNA performed by Delano [facility] MD at [facility] ENDOSCOPY    VEIN SURGERY Bilateral 2016       Current Outpatient Medications:     lisinopril (PRINIVIL;ZESTRIL) 20 MG tablet, Take 1 tablet by mouth 2 times daily, Disp: 180 tablet, Rfl: 1    vitamin C (ASCORBIC ACID) 500 MG tablet, Take 1 tablet by mouth daily, Disp: , Rfl:     b complex vitamins capsule, Take 1 capsule by mouth Daily with supper, Disp: , Rfl:     vitamin B-12 (CYANOCOBALAMIN) 1000 MCG tablet, Take 1 tablet by mouth daily, Disp: , Rfl:     calcium carbonate 1500 (600 Ca) MG TABS tablet, Take 1 tablet by mouth daily, Disp: , Rfl:     Omega-3 Fatty Acids (FISH OIL) 1000 MG capsule, Take 2 capsules by mouth 2 times daily, Disp: , Rfl:     Cholecalciferol (VITAMIN D) 50 MCG (2000 UT) CAPS capsule, Take 1 capsule by mouth in the morning and at bedtime, Disp: , Rfl:     Garlic 1500 MG CAPS, Take 3,000 mg by mouth in the morning and at bedtime, Disp: , Rfl:     cod liver oil CAPS, Take 2 capsules by mouth in

## 2025-06-20 DIAGNOSIS — R15.9 INCONTINENCE OF FECES, UNSPECIFIED FECAL INCONTINENCE TYPE: Primary | ICD-10-CM

## 2025-06-20 NOTE — TELEPHONE ENCOUNTER
Needs order placed for chux /pads and depends / does not have a supplier and would like for our office to send in to who we prefer.

## (undated) DEVICE — ENDOSCOPIC ULTRASOUND FINE NEEDLE BIOPSY (FNB) DEVICE: Brand: ACQUIRE

## (undated) DEVICE — CONTAINER SPEC COLL 960ML POLYPR TRIANG GRAD INTAKE/OUTPUT

## (undated) DEVICE — MASK,FACE,MAXFLUIDPROTECT,SHIELD/ERLPS: Brand: MEDLINE

## (undated) DEVICE — Device: Brand: BALLOON3

## (undated) DEVICE — YANKAUER,BULB TIP,W/O VENT,RIGID,STERILE: Brand: MEDLINE

## (undated) DEVICE — BAG SPECIMEN BIOHAZARD 6IN X 9IN

## (undated) DEVICE — KIT BEDSIDE REVITAL OX 500ML

## (undated) DEVICE — TUBING, SUCTION, 1/4" X 10', STRAIGHT: Brand: MEDLINE

## (undated) DEVICE — JELLY,LUBE,STERILE,FLIP TOP,TUBE,4-OZ: Brand: MEDLINE

## (undated) DEVICE — KENDALL 450 SERIES MONITORING FOAM ELECTRODE - RECTANGULAR SHAPE ( 3/PK): Brand: KENDALL

## (undated) DEVICE — Device: Brand: DEFENDO VALVE AND CONNECTOR KIT

## (undated) DEVICE — FORCEPS BX L240CM JAW DIA2.8MM L CAP W/ NDL MIC MESH TOOTH

## (undated) DEVICE — BLOCK BITE 60FR CAREGUARD

## (undated) DEVICE — 4-PORT MANIFOLD: Brand: NEPTUNE 2

## (undated) DEVICE — GOWN ISOLATN XL BLU POLYPR OVR HD OPN BK KNIT CUF PROTCT

## (undated) DEVICE — WIPES SKIN CLOTH READYPREP 9 X 10.5 IN 2% CHLORHEX GLUCONATE CHG PREOP

## (undated) DEVICE — SPONGE GZ 4IN 4IN 4 PLY N WVN AVANT